# Patient Record
Sex: FEMALE | Race: BLACK OR AFRICAN AMERICAN | NOT HISPANIC OR LATINO | Employment: OTHER | URBAN - METROPOLITAN AREA
[De-identification: names, ages, dates, MRNs, and addresses within clinical notes are randomized per-mention and may not be internally consistent; named-entity substitution may affect disease eponyms.]

---

## 2018-04-04 ENCOUNTER — HISTORICAL (OUTPATIENT)
Dept: CARDIOLOGY | Facility: HOSPITAL | Age: 63
End: 2018-04-04

## 2018-04-04 ENCOUNTER — HISTORICAL (OUTPATIENT)
Dept: RADIOLOGY | Facility: HOSPITAL | Age: 63
End: 2018-04-04

## 2019-05-17 ENCOUNTER — HISTORICAL (OUTPATIENT)
Dept: ADMINISTRATIVE | Facility: HOSPITAL | Age: 64
End: 2019-05-17

## 2019-08-22 ENCOUNTER — HISTORICAL (OUTPATIENT)
Dept: ADMINISTRATIVE | Facility: HOSPITAL | Age: 64
End: 2019-08-22

## 2019-08-25 LAB — FINAL CULTURE: NORMAL

## 2019-12-30 LAB
INFLUENZA A ANTIGEN, POC: NEGATIVE
INFLUENZA B ANTIGEN, POC: NEGATIVE

## 2020-10-16 LAB — PAP RECOMMENDATION EXT: NORMAL

## 2021-03-15 ENCOUNTER — HISTORICAL (OUTPATIENT)
Dept: ADMINISTRATIVE | Facility: HOSPITAL | Age: 66
End: 2021-03-15

## 2021-09-13 ENCOUNTER — HISTORICAL (OUTPATIENT)
Dept: ADMINISTRATIVE | Facility: HOSPITAL | Age: 66
End: 2021-09-13

## 2021-09-16 LAB — FINAL CULTURE: NORMAL

## 2022-02-16 LAB — CRC RECOMMENDATION EXT: NORMAL

## 2022-03-28 ENCOUNTER — HISTORICAL (OUTPATIENT)
Dept: ADMINISTRATIVE | Facility: HOSPITAL | Age: 67
End: 2022-03-28

## 2022-03-28 LAB
CHOLEST SERPL-MCNC: 203 MG/DL
CHOLEST/HDLC SERPL: 4 {RATIO} (ref 0–5)
HDLC SERPL-MCNC: 54 MG/DL (ref 40–60)
LDLC SERPL CALC-MCNC: 128 MG/DL (ref 50–140)
TRIGL SERPL-MCNC: 107 MG/DL (ref 0–150)
VLDLC SERPL CALC-MCNC: 21 MG/DL

## 2022-04-06 ENCOUNTER — HISTORICAL (OUTPATIENT)
Dept: ADMINISTRATIVE | Facility: HOSPITAL | Age: 67
End: 2022-04-06

## 2022-04-06 LAB
ABS NEUT (OLG): 1.8 (ref 2.1–9.2)
ALBUMIN SERPL-MCNC: 3.5 G/DL (ref 3.4–4.8)
ALBUMIN/GLOB SERPL: 0.9 {RATIO} (ref 1.1–2)
ALP SERPL-CCNC: 62 U/L (ref 40–150)
ALT SERPL-CCNC: 15 U/L (ref 0–55)
APPEARANCE, UA: NORMAL
AST SERPL-CCNC: 22 U/L (ref 5–34)
BACTERIA SPEC CULT: NORMAL
BASOPHILS # BLD AUTO: 0 10*3/UL (ref 0–0.2)
BASOPHILS NFR BLD AUTO: 1 %
BILIRUB SERPL-MCNC: 0.5 MG/DL
BILIRUB UR QL STRIP: NEGATIVE
BILIRUBIN DIRECT+TOT PNL SERPL-MCNC: 0.2 (ref 0–0.5)
BILIRUBIN DIRECT+TOT PNL SERPL-MCNC: 0.3 (ref 0–0.8)
BUN SERPL-MCNC: 19.9 MG/DL (ref 9.8–20.1)
CALCIUM SERPL-MCNC: 9.7 MG/DL (ref 8.7–10.5)
CHLORIDE SERPL-SCNC: 103 MMOL/L (ref 98–107)
CHOLEST SERPL-MCNC: 192 MG/DL
CHOLEST/HDLC SERPL: 4 {RATIO} (ref 0–5)
CO2 SERPL-SCNC: 27 MMOL/L (ref 23–31)
COLOR UR: NORMAL
CREAT SERPL-MCNC: 0.91 MG/DL (ref 0.55–1.02)
CREAT UR-MCNC: 305 MG/DL (ref 45–106)
EOSINOPHIL # BLD AUTO: 0.2 10*3/UL (ref 0–0.9)
EOSINOPHIL NFR BLD AUTO: 5 %
ERYTHROCYTE [DISTWIDTH] IN BLOOD BY AUTOMATED COUNT: 13.9 % (ref 11.5–17)
EST. AVERAGE GLUCOSE BLD GHB EST-MCNC: 165.7 MG/DL
GLOBULIN SER-MCNC: 3.9 G/DL (ref 2.4–3.5)
GLUCOSE (UA): NEGATIVE
GLUCOSE SERPL-MCNC: 110 MG/DL (ref 82–115)
HBA1C MFR BLD: 7.4 %
HCT VFR BLD AUTO: 37.8 % (ref 37–47)
HDLC SERPL-MCNC: 50 MG/DL (ref 35–60)
HEMOLYSIS INTERF INDEX SERPL-ACNC: 2
HGB BLD-MCNC: 12.4 G/DL (ref 12–16)
HGB UR QL STRIP: NEGATIVE
ICTERIC INTERF INDEX SERPL-ACNC: 1
KETONES UR QL STRIP: NORMAL
LDLC SERPL CALC-MCNC: 123 MG/DL (ref 50–140)
LEUKOCYTE ESTERASE UR QL STRIP: NORMAL
LIPEMIC INTERF INDEX SERPL-ACNC: 5
LYMPHOCYTES # BLD AUTO: 1.4 10*3/UL (ref 0.6–4.6)
LYMPHOCYTES NFR BLD AUTO: 36 %
MANUAL DIFF? (OHS): NO
MCH RBC QN AUTO: 29.7 PG (ref 27–31)
MCHC RBC AUTO-ENTMCNC: 32.8 G/DL (ref 33–36)
MCV RBC AUTO: 90.4 FL (ref 80–94)
MICROALBUMIN UR-MCNC: 24.2
MICROALBUMIN/CREAT RATIO PNL UR: 7.9 (ref 0–30)
MONOCYTES # BLD AUTO: 0.6 10*3/UL (ref 0.1–1.3)
MONOCYTES NFR BLD AUTO: 14 %
NEUTROPHILS # BLD AUTO: 1.8 10*3/UL (ref 2.1–9.2)
NEUTROPHILS NFR BLD AUTO: 45 %
NITRITE UR QL STRIP: NEGATIVE
PH UR STRIP: 5 [PH] (ref 5–9)
PLATELET # BLD AUTO: 235 10*3/UL (ref 130–400)
PMV BLD AUTO: 10.2 FL (ref 9.4–12.4)
POTASSIUM SERPL-SCNC: 3.6 MMOL/L (ref 3.5–5.1)
PROT SERPL-MCNC: 7.4 G/DL (ref 5.8–7.6)
PROT UR QL STRIP: NEGATIVE
RBC # BLD AUTO: 4.18 10*6/UL (ref 4.2–5.4)
RBC #/AREA URNS HPF: NORMAL /[HPF] (ref 0–2)
SODIUM SERPL-SCNC: 140 MMOL/L (ref 136–145)
SP GR UR STRIP: 1.02 (ref 1–1.03)
SQUAMOUS EPITHELIAL, UA: NORMAL (ref 0–4)
TRIGL SERPL-MCNC: 96 MG/DL (ref 37–140)
TSH SERPL-ACNC: 0.96 M[IU]/L (ref 0.35–4.94)
UA WBC MAN: NORMAL (ref 0–2)
UROBILINOGEN UR STRIP-ACNC: 1
VLDLC SERPL CALC-MCNC: 19 MG/DL
WBC # SPEC AUTO: 4 10*3/UL (ref 4.5–11.5)

## 2022-04-10 ENCOUNTER — HISTORICAL (OUTPATIENT)
Dept: ADMINISTRATIVE | Facility: HOSPITAL | Age: 67
End: 2022-04-10

## 2022-04-24 VITALS
OXYGEN SATURATION: 99 % | SYSTOLIC BLOOD PRESSURE: 121 MMHG | WEIGHT: 160 LBS | HEIGHT: 64 IN | BODY MASS INDEX: 27.31 KG/M2 | DIASTOLIC BLOOD PRESSURE: 85 MMHG

## 2022-07-20 ENCOUNTER — APPOINTMENT (OUTPATIENT)
Dept: LAB | Facility: HOSPITAL | Age: 67
End: 2022-07-20
Attending: INTERNAL MEDICINE
Payer: MEDICARE

## 2022-07-20 DIAGNOSIS — E78.5 HYPERLIPIDEMIA, UNSPECIFIED HYPERLIPIDEMIA TYPE: Primary | ICD-10-CM

## 2022-07-20 DIAGNOSIS — E11.9 DIABETES MELLITUS WITHOUT COMPLICATION: ICD-10-CM

## 2022-07-20 DIAGNOSIS — I10 ESSENTIAL HYPERTENSION, MALIGNANT: ICD-10-CM

## 2022-07-20 LAB
ALBUMIN SERPL-MCNC: 3.6 GM/DL (ref 3.4–4.8)
ALBUMIN/GLOB SERPL: 1 RATIO (ref 1.1–2)
ALP SERPL-CCNC: 54 UNIT/L (ref 40–150)
ALT SERPL-CCNC: 12 UNIT/L (ref 0–55)
APPEARANCE UR: CLEAR
AST SERPL-CCNC: 20 UNIT/L (ref 5–34)
BACTERIA #/AREA URNS AUTO: ABNORMAL /HPF
BASOPHILS # BLD AUTO: 0.06 X10(3)/MCL (ref 0–0.2)
BASOPHILS NFR BLD AUTO: 1.4 %
BILIRUB UR QL STRIP.AUTO: NEGATIVE MG/DL
BILIRUBIN DIRECT+TOT PNL SERPL-MCNC: 0.6 MG/DL
BUN SERPL-MCNC: 16.1 MG/DL (ref 9.8–20.1)
CALCIUM SERPL-MCNC: 9.6 MG/DL (ref 8.4–10.2)
CHLORIDE SERPL-SCNC: 103 MMOL/L (ref 98–107)
CHOLEST SERPL-MCNC: 177 MG/DL
CHOLEST/HDLC SERPL: 4 {RATIO} (ref 0–5)
CO2 SERPL-SCNC: 27 MMOL/L (ref 23–31)
COLOR UR AUTO: YELLOW
CREAT SERPL-MCNC: 0.84 MG/DL (ref 0.55–1.02)
CREAT UR-MCNC: 186.5 MG/DL (ref 47–110)
EOSINOPHIL # BLD AUTO: 0.3 X10(3)/MCL (ref 0–0.9)
EOSINOPHIL NFR BLD AUTO: 7.1 %
ERYTHROCYTE [DISTWIDTH] IN BLOOD BY AUTOMATED COUNT: 13.8 % (ref 11.5–17)
EST. AVERAGE GLUCOSE BLD GHB EST-MCNC: 148.5 MG/DL
GLOBULIN SER-MCNC: 3.6 GM/DL (ref 2.4–3.5)
GLUCOSE SERPL-MCNC: 106 MG/DL (ref 82–115)
GLUCOSE UR QL STRIP.AUTO: NEGATIVE MG/DL
HBA1C MFR BLD: 6.8 %
HCT VFR BLD AUTO: 35.8 % (ref 37–47)
HDLC SERPL-MCNC: 46 MG/DL (ref 35–60)
HGB BLD-MCNC: 11.3 GM/DL (ref 12–16)
IMM GRANULOCYTES # BLD AUTO: 0.01 X10(3)/MCL (ref 0–0.04)
IMM GRANULOCYTES NFR BLD AUTO: 0.2 %
KETONES UR QL STRIP.AUTO: NEGATIVE MG/DL
LDLC SERPL CALC-MCNC: 108 MG/DL (ref 50–140)
LEUKOCYTE ESTERASE UR QL STRIP.AUTO: ABNORMAL UNIT/L
LYMPHOCYTES # BLD AUTO: 1.71 X10(3)/MCL (ref 0.6–4.6)
LYMPHOCYTES NFR BLD AUTO: 40.3 %
MCH RBC QN AUTO: 29.4 PG (ref 27–31)
MCHC RBC AUTO-ENTMCNC: 31.6 MG/DL (ref 33–36)
MCV RBC AUTO: 93.2 FL (ref 80–94)
MICROALBUMIN UR-MCNC: 10.6 UG/ML
MICROALBUMIN/CREAT RATIO PNL UR: 5.7 MG/GM CR (ref 0–30)
MONOCYTES # BLD AUTO: 0.59 X10(3)/MCL (ref 0.1–1.3)
MONOCYTES NFR BLD AUTO: 13.9 %
NEUTROPHILS # BLD AUTO: 1.6 X10(3)/MCL (ref 2.1–9.2)
NEUTROPHILS NFR BLD AUTO: 37.1 %
NITRITE UR QL STRIP.AUTO: NEGATIVE
NRBC BLD AUTO-RTO: 0 %
PH UR STRIP.AUTO: 5 [PH]
PLATELET # BLD AUTO: 268 X10(3)/MCL (ref 130–400)
PMV BLD AUTO: 10.6 FL (ref 7.4–10.4)
POTASSIUM SERPL-SCNC: 4.1 MMOL/L (ref 3.5–5.1)
PROT SERPL-MCNC: 7.2 GM/DL (ref 5.8–7.6)
PROT UR QL STRIP.AUTO: NEGATIVE MG/DL
RBC # BLD AUTO: 3.84 X10(6)/MCL (ref 4.2–5.4)
RBC #/AREA URNS AUTO: ABNORMAL /HPF
RBC UR QL AUTO: NEGATIVE UNIT/L
SODIUM SERPL-SCNC: 138 MMOL/L (ref 136–145)
SP GR UR STRIP.AUTO: 1.02 (ref 1–1.03)
SQUAMOUS #/AREA URNS AUTO: <5 /HPF
TRIGL SERPL-MCNC: 115 MG/DL (ref 37–140)
TSH SERPL-ACNC: 0.8 UIU/ML (ref 0.35–4.94)
UROBILINOGEN UR STRIP-ACNC: 0.2 MG/DL
VLDLC SERPL CALC-MCNC: 23 MG/DL
WBC # SPEC AUTO: 4.2 X10(3)/MCL (ref 4.5–11.5)
WBC #/AREA URNS AUTO: 13 /HPF

## 2022-07-20 PROCEDURE — 81001 URINALYSIS AUTO W/SCOPE: CPT

## 2022-07-20 PROCEDURE — 36415 COLL VENOUS BLD VENIPUNCTURE: CPT

## 2022-07-20 PROCEDURE — 84443 ASSAY THYROID STIM HORMONE: CPT

## 2022-07-20 PROCEDURE — 85025 COMPLETE CBC W/AUTO DIFF WBC: CPT

## 2022-07-20 PROCEDURE — 82043 UR ALBUMIN QUANTITATIVE: CPT

## 2022-07-20 PROCEDURE — 80061 LIPID PANEL: CPT

## 2022-07-20 PROCEDURE — 83036 HEMOGLOBIN GLYCOSYLATED A1C: CPT

## 2022-07-20 PROCEDURE — 80053 COMPREHEN METABOLIC PANEL: CPT

## 2022-07-22 LAB — BACTERIA UR CULT: ABNORMAL

## 2022-07-26 ENCOUNTER — OFFICE VISIT (OUTPATIENT)
Dept: INTERNAL MEDICINE | Facility: CLINIC | Age: 67
End: 2022-07-26
Payer: MEDICARE

## 2022-07-26 VITALS
WEIGHT: 150 LBS | BODY MASS INDEX: 25.61 KG/M2 | OXYGEN SATURATION: 98 % | SYSTOLIC BLOOD PRESSURE: 130 MMHG | HEIGHT: 64 IN | HEART RATE: 68 BPM | DIASTOLIC BLOOD PRESSURE: 60 MMHG

## 2022-07-26 DIAGNOSIS — M81.0 AGE RELATED OSTEOPOROSIS, UNSPECIFIED PATHOLOGICAL FRACTURE PRESENCE: ICD-10-CM

## 2022-07-26 DIAGNOSIS — I10 HYPERTENSION, UNSPECIFIED TYPE: ICD-10-CM

## 2022-07-26 DIAGNOSIS — D64.9 ANEMIA, UNSPECIFIED TYPE: ICD-10-CM

## 2022-07-26 DIAGNOSIS — E78.5 HYPERLIPIDEMIA, UNSPECIFIED HYPERLIPIDEMIA TYPE: Primary | ICD-10-CM

## 2022-07-26 DIAGNOSIS — E11.9 TYPE 2 DIABETES MELLITUS WITHOUT COMPLICATION, WITHOUT LONG-TERM CURRENT USE OF INSULIN: ICD-10-CM

## 2022-07-26 PROCEDURE — 99214 PR OFFICE/OUTPT VISIT, EST, LEVL IV, 30-39 MIN: ICD-10-PCS | Mod: ,,, | Performed by: INTERNAL MEDICINE

## 2022-07-26 PROCEDURE — 99214 OFFICE O/P EST MOD 30 MIN: CPT | Mod: ,,, | Performed by: INTERNAL MEDICINE

## 2022-07-26 RX ORDER — ASPIRIN 325 MG
50000 TABLET, DELAYED RELEASE (ENTERIC COATED) ORAL
COMMUNITY
Start: 2022-07-07 | End: 2023-12-05

## 2022-07-26 RX ORDER — TRIAMTERENE AND HYDROCHLOROTHIAZIDE 37.5; 25 MG/1; MG/1
1 CAPSULE ORAL DAILY
COMMUNITY
Start: 2022-05-20

## 2022-07-26 RX ORDER — ALPRAZOLAM 0.25 MG/1
0.25 TABLET ORAL 3 TIMES DAILY
COMMUNITY
Start: 2022-03-31 | End: 2022-10-17 | Stop reason: SDUPTHER

## 2022-07-26 RX ORDER — GLIMEPIRIDE 4 MG/1
4 TABLET ORAL 2 TIMES DAILY
COMMUNITY
Start: 2022-07-17

## 2022-07-26 RX ORDER — BISOPROLOL FUMARATE 5 MG/1
TABLET, FILM COATED ORAL
COMMUNITY
Start: 2022-01-27 | End: 2023-07-21 | Stop reason: SDUPTHER

## 2022-07-26 RX ORDER — RAMIPRIL 10 MG/1
10 CAPSULE ORAL DAILY
COMMUNITY
Start: 2022-07-11

## 2022-07-26 RX ORDER — SITAGLIPTIN 100 MG/1
100 TABLET, FILM COATED ORAL NIGHTLY
COMMUNITY
Start: 2022-07-13

## 2022-07-26 NOTE — PROGRESS NOTES
Subjective:      Patient ID: Angelia Dooley is a 66 y.o. female.    Chief Complaint: Follow-up (3 month)      HPI: This patient is an established patient. Her active problem list and current medication listed in her chart will be reviewed at the time of this visit. This patient's medical illnesses have been well recognized and documented in her chart. A review of systems will be taken at the time of this visit and any new information necessary for her care will be documented. She has done well since her last visit to the office. She has been compliant in taking medication, and refilling her medication on a regular schedule. She had laboratory studies drawn prior to her office visit, and I took the liberty to review these results in detail with her. I have given her a hand written explanation of the results for her records.  This patient is a 66-year-old female patient who was started on oral agents for adult onset diabetes mellitus, in she has returned in follow-up to have repeat laboratory studies, which were already mentioned in this narrative.  She is doing well, and she has not had any side effects from the medication given.  She still has situational stresses, because her mother has been chronically ill, and  lives in a nursing home environment.  She did lose weight since her last visit, and she is roughly 152 lb at present.         The patient's Health Maintenance was reviewed and the following appears to be due at this time:   Health Maintenance Due   Topic Date Due    Hepatitis C Screening  Never done    Foot Exam  Never done    Eye Exam  Never done    HIV Screening  Never done    TETANUS VACCINE  Never done    Low Dose Statin  Never done    DEXA Scan  Never done    Colorectal Cancer Screening  Never done    COVID-19 Vaccine (4 - Booster for Moderna series) 03/10/2022        Recent Labwork  Appointment on 07/20/2022   Component Date Value Ref Range Status    Sodium Level 07/20/2022 138   136 - 145 mmol/L Final    Potassium Level 07/20/2022 4.1  3.5 - 5.1 mmol/L Final    Chloride 07/20/2022 103  98 - 107 mmol/L Final    Carbon Dioxide 07/20/2022 27  23 - 31 mmol/L Final    Glucose Level 07/20/2022 106  82 - 115 mg/dL Final    Blood Urea Nitrogen 07/20/2022 16.1  9.8 - 20.1 mg/dL Final    Creatinine 07/20/2022 0.84  0.55 - 1.02 mg/dL Final    Calcium Level Total 07/20/2022 9.6  8.4 - 10.2 mg/dL Final    Protein Total 07/20/2022 7.2  5.8 - 7.6 gm/dL Final    Albumin Level 07/20/2022 3.6  3.4 - 4.8 gm/dL Final    Globulin 07/20/2022 3.6 (A) 2.4 - 3.5 gm/dL Final    Albumin/Globulin Ratio 07/20/2022 1.0 (A) 1.1 - 2.0 ratio Final    Bilirubin Total 07/20/2022 0.6  <=1.5 mg/dL Final    Alkaline Phosphatase 07/20/2022 54  40 - 150 unit/L Final    Alanine Aminotransferase 07/20/2022 12  0 - 55 unit/L Final    Aspartate Aminotransferase 07/20/2022 20  5 - 34 unit/L Final    Estimated GFR- 07/20/2022 >60  mls/min/1.73/m2 Final    Cholesterol Total 07/20/2022 177  <=200 mg/dL Final    HDL Cholesterol 07/20/2022 46  35 - 60 mg/dL Final    Triglyceride 07/20/2022 115  37 - 140 mg/dL Final    Cholesterol/HDL Ratio 07/20/2022 4  0 - 5 Final    Very Low Density Lipoprotein 07/20/2022 23   Final    LDL Cholesterol 07/20/2022 108.00  50.00 - 140.00 mg/dL Final    Hemoglobin A1c 07/20/2022 6.8  <=7.0 % Final    Estimated Average Glucose 07/20/2022 148.5  mg/dL Final    Thyroid Stimulating Hormone 07/20/2022 0.8025  0.3500 - 4.9400 uIU/mL Final    Color, UA 07/20/2022 Yellow  Yellow, Colorless, Other, Clear Final    Appearance, UA 07/20/2022 Clear  Clear Final    Specific Gravity, UA 07/20/2022 1.018  1.001 - 1.030 Final    pH, UA 07/20/2022 5.0  5.0, 5.5, 6.0, 6.5, 7.0, 7.5, 8.0, 8.5 Final    Protein, UA 07/20/2022 Negative  Negative, 300  mg/dL Final    Glucose, UA 07/20/2022 Negative  Negative, Normal mg/dL Final    Ketones, UA 07/20/2022 Negative  Negative, +1, +2, +3,  +4, +5, >=160, >=80 mg/dL Final    Blood, UA 07/20/2022 Negative  Negative unit/L Final    Bilirubin, UA 07/20/2022 Negative  Negative mg/dL Final    Urobilinogen, UA 07/20/2022 0.2  0.2, 1.0, Normal mg/dL Final    Nitrites, UA 07/20/2022 Negative  Negative Final    Leukocyte Esterase, UA 07/20/2022 2+ (A) Negative, 75  unit/L Final    Urine Microalbumin 07/20/2022 10.6  <=30.0 ug/ml Final    Urine Creatinine 07/20/2022 186.5 (A) 47.0 - 110.0 mg/dL Final    Microalbumin Creatinine Ratio 07/20/2022 5.7  0.0 - 30.0 mg/gm Cr Final    WBC 07/20/2022 4.2 (A) 4.5 - 11.5 x10(3)/mcL Final    RBC 07/20/2022 3.84 (A) 4.20 - 5.40 x10(6)/mcL Final    Hgb 07/20/2022 11.3 (A) 12.0 - 16.0 gm/dL Final    Hct 07/20/2022 35.8 (A) 37.0 - 47.0 % Final    MCV 07/20/2022 93.2  80.0 - 94.0 fL Final    MCH 07/20/2022 29.4  27.0 - 31.0 pg Final    MCHC 07/20/2022 31.6 (A) 33.0 - 36.0 mg/dL Final    RDW 07/20/2022 13.8  11.5 - 17.0 % Final    Platelet 07/20/2022 268  130 - 400 x10(3)/mcL Final    MPV 07/20/2022 10.6 (A) 7.4 - 10.4 fL Final    Neut % 07/20/2022 37.1  % Final    Lymph % 07/20/2022 40.3  % Final    Mono % 07/20/2022 13.9  % Final    Eos % 07/20/2022 7.1  % Final    Basophil % 07/20/2022 1.4  % Final    Lymph # 07/20/2022 1.71  0.6 - 4.6 x10(3)/mcL Final    Neut # 07/20/2022 1.6 (A) 2.1 - 9.2 x10(3)/mcL Final    Mono # 07/20/2022 0.59  0.1 - 1.3 x10(3)/mcL Final    Eos # 07/20/2022 0.30  0 - 0.9 x10(3)/mcL Final    Baso # 07/20/2022 0.06  0 - 0.2 x10(3)/mcL Final    IG# 07/20/2022 0.01  0 - 0.04 x10(3)/mcL Final    IG% 07/20/2022 0.2  % Final    NRBC% 07/20/2022 0.0  % Final    RBC, UA 07/20/2022 0-2  None Seen, 0-2, 3-5, 0-5 /HPF Final    WBC, UA 07/20/2022 13 (A) <=5 /HPF Final    Squamous Epithelial Cells, UA 07/20/2022 <5  <=5 /HPF Final    Bacteria, UA 07/20/2022 None Seen  None Seen, Rare, Occasional /HPF Final    Urine Culture 07/20/2022 Less than 10,000 colonies/ml Klebsiella  pneumoniae (A)  Final    [place] counts <10,000/ml are of questionable significance and may or may not indicate contamination.  Therefore organisms are identified only.  If further workup is desired please notify Microbiology        Past Medical History:  Past Medical History:   Diagnosis Date    Diabetes mellitus, type 2     Hyperlipidemia     Hypertension      Past Surgical History:   Procedure Laterality Date    CHOLECYSTECTOMY      HYSTERECTOMY      MYOMECTOMY       Review of patient's allergies indicates:   Allergen Reactions    Acetaminophen-codeine     Cyclobenzaprine     Hydrocodone-acetaminophen      Current Outpatient Medications on File Prior to Visit   Medication Sig Dispense Refill    ALPRAZolam (XANAX) 0.25 MG tablet Take 0.25 mg by mouth 3 (three) times daily.      bisoprolol (ZEBETA) 5 MG tablet   See Instructions, TAKE 1 TABLET BY MOUTH EVERY DAY, # 90 tab(s), 3 Refill(s), Pharmacy: [facility]/pharmacy #[id number], 163, cm, Height/Length Dosing, 03/31/22 10:47:00 CDT, 69, kg, Weight Dosing, 03/31/22 10:47:00 CDT      cholecalciferol, vitamin D3, 1,250 mcg (50,000 unit) capsule Take 50,000 Units by mouth every 7 days.      glimepiride (AMARYL) 2 MG tablet       JANUVIA 100 mg Tab Take 100 mg by mouth every evening.      ramipriL (ALTACE) 10 MG capsule Take 10 mg by mouth once daily.      triamterene-hydrochlorothiazide 37.5-25 mg (DYAZIDE) 37.5-25 mg per capsule Take 1 capsule by mouth once daily.       No current facility-administered medications on file prior to visit.     Social History     Socioeconomic History    Marital status:    Tobacco Use    Smoking status: Never Smoker    Smokeless tobacco: Never Used   Substance and Sexual Activity    Alcohol use: Not Currently    Drug use: Never    Sexual activity: Not Currently     History reviewed. No pertinent family history.    Review of Systems  Review of Systems   Constitutional: Negative.    HENT: Negative.    Eyes: Negative.   "  Respiratory: Negative.    Cardiovascular: Negative.    Gastrointestinal: Negative.    Genitourinary: Negative.    Musculoskeletal: Negative.    Neurological: Negative.    Endo/Heme/Allergies: Negative.    Psychiatric/Behavioral: Negative.    Objective:   /60   Pulse 68   Ht 5' 4" (1.626 m)   Wt 68 kg (150 lb)   SpO2 98%   BMI 25.75 kg/m²     Protective Sensation (w/ 10 gram monofilament):  Right: Intact  Left: Intact    Visual Inspection:  Normal -  Bilateral    Pedal Pulses:   Right: Present  Left: Present    Posterior tibialis:   Right:Present  Left: Present      Physical Exam  Vitals and nursing note reviewed.   Constitutional:       General: He is not in acute distress.     Appearance: Normal appearance.   HENT:      Head: Normocephalic and atraumatic.      Right Ear: Tympanic membrane and ear canal normal.      Left Ear: Tympanic membrane and ear canal normal.      Nose: Nose normal.      Mouth/Throat:      Pharynx: Oropharynx is clear. No oropharyngeal exudate or posterior oropharyngeal erythema.   Eyes:      Extraocular Movements: Extraocular movements intact.      Pupils: Pupils are equal, round, and reactive to light.   Cardiovascular:      Rate and Rhythm: Normal rate and regular rhythm.      Pulses: Normal pulses.      Heart sounds: Normal heart sounds. No murmur heard.    No friction rub. No gallop.   Pulmonary:      Effort: Pulmonary effort is normal. No respiratory distress.      Breath sounds: Normal breath sounds.   Abdominal:      General: Abdomen is flat. Bowel sounds are normal.      Palpations: Abdomen is soft.   Genitourinary:     Rectum: Normal.   Musculoskeletal:         General: Normal range of motion.      Cervical back: Normal range of motion and neck supple.   Skin:     General: Skin is warm.      Capillary Refill: Capillary refill takes less than 2 seconds.   Neurological:      General: No focal deficit present.      Mental Status: He is alert and oriented to person, place, " and time.   Psychiatric:         Mood and Affect: Mood normal.         Behavior: Behavior normal.         Thought Content: Thought content normal.         Judgment: Judgment normal.   Assessment:     1. Hyperlipidemia, unspecified hyperlipidemia type    2. Hypertension, unspecified type    3. Type 2 diabetes mellitus without complication, without long-term current use of insulin      Plan:   I am having Angelia Dooley maintain her ALPRAZolam, bisoprolol, cholecalciferol (vitamin D3), glimepiride, ramipriL, JANUVIA, and triamterene-hydrochlorothiazide 37.5-25 mg. This patient is an established patient who has come in for an examination. She has done very well since her last visit to the office. She has a negative review of systems, except as mentioned above. She has not had any new problems to develop in the recent past. I was able to review her laboratory studies with her completely, as mentioned in the history of present illness. I will make medication changes as necessary, and her follow-up will continue as before.  This patient is laboratory studies have improved considerably since she was last seen in the office.  She has not had any side effects from her medication, which will be continued.  She did have a slight decrease in her hemoglobin and hematocrit levels, and I have asked her to take iron supplements for at least 6-8 weeks, and she will return in follow-up at that time.  She will report any increase in symptoms or new symptoms that may occur while on this medication.  Otherwise, she is tolerating her diabetic medication well, and she has no significant weight loss to itchy.  Activity, such as exercise may help her control her blood sugar going forward.  I answered all the questions that she had while in the office, and I have given her hand written explanation of her results for her records.    This patient should continue to take all medication chronically given for known medical illnesses.    I have  made recommendations regarding better glycemic control with this patient today. The goal is to keep the blood sugar under a hemoglobin A1c of 7. Diet, medication, an increase in exercise or physical activity has been stressed.    I discussed blood pressure management strategies with the patient today. I also recommend a low salt and low pork diet. We discussed antihypertensive medication. I have stressed an increase in physical activity and exercise.    Exercise is defined as 30 minutes of sustained activity performed at least 3 or 4 days each week.          Problem List Items Addressed This Visit    None     Visit Diagnoses     Hyperlipidemia, unspecified hyperlipidemia type    -  Primary    Relevant Orders    CBC Auto Differential    Comprehensive Metabolic Panel    Hemoglobin A1C    Lipid Panel    Microalbumin/Creatinine Ratio, Urine    Urinalysis, Reflex to Urine Culture Urine, Clean Catch    TSH    Hypertension, unspecified type        Relevant Orders    CBC Auto Differential    Comprehensive Metabolic Panel    Hemoglobin A1C    Lipid Panel    Microalbumin/Creatinine Ratio, Urine    Urinalysis, Reflex to Urine Culture Urine, Clean Catch    TSH    Type 2 diabetes mellitus without complication, without long-term current use of insulin        Relevant Medications    glimepiride (AMARYL) 2 MG tablet    JANUVIA 100 mg Tab    Other Relevant Orders    CBC Auto Differential    Comprehensive Metabolic Panel    Hemoglobin A1C    Lipid Panel    Microalbumin/Creatinine Ratio, Urine    Urinalysis, Reflex to Urine Culture Urine, Clean Catch    TSH          Angelia was seen today for follow-up.    Diagnoses and all orders for this visit:    Hyperlipidemia, unspecified hyperlipidemia type  -     CBC Auto Differential; Future  -     Comprehensive Metabolic Panel; Future  -     Hemoglobin A1C; Future  -     Lipid Panel; Future  -     Microalbumin/Creatinine Ratio, Urine; Future  -     Urinalysis, Reflex to Urine Culture Urine,  Clean Catch; Future  -     TSH; Future    Hypertension, unspecified type  -     CBC Auto Differential; Future  -     Comprehensive Metabolic Panel; Future  -     Hemoglobin A1C; Future  -     Lipid Panel; Future  -     Microalbumin/Creatinine Ratio, Urine; Future  -     Urinalysis, Reflex to Urine Culture Urine, Clean Catch; Future  -     TSH; Future    Type 2 diabetes mellitus without complication, without long-term current use of insulin  -     CBC Auto Differential; Future  -     Comprehensive Metabolic Panel; Future  -     Hemoglobin A1C; Future  -     Lipid Panel; Future  -     Microalbumin/Creatinine Ratio, Urine; Future  -     Urinalysis, Reflex to Urine Culture Urine, Clean Catch; Future  -     TSH; Future

## 2022-07-28 ENCOUNTER — PATIENT OUTREACH (OUTPATIENT)
Dept: ADMINISTRATIVE | Facility: HOSPITAL | Age: 67
End: 2022-07-28
Payer: MEDICARE

## 2022-08-04 ENCOUNTER — HOSPITAL ENCOUNTER (OUTPATIENT)
Dept: RADIOLOGY | Facility: HOSPITAL | Age: 67
Discharge: HOME | End: 2022-08-04
Attending: INTERNAL MEDICINE
Payer: MEDICARE

## 2022-08-04 DIAGNOSIS — M81.0 AGE RELATED OSTEOPOROSIS, UNSPECIFIED PATHOLOGICAL FRACTURE PRESENCE: ICD-10-CM

## 2022-08-04 PROCEDURE — 77080 DEXA BONE DENSITY SPINE HIP: ICD-10-PCS | Mod: 26,,, | Performed by: RADIOLOGY

## 2022-08-04 PROCEDURE — 77080 DXA BONE DENSITY AXIAL: CPT | Mod: 26,,, | Performed by: RADIOLOGY

## 2022-08-04 PROCEDURE — 77080 DXA BONE DENSITY AXIAL: CPT | Mod: TC

## 2022-08-09 ENCOUNTER — TELEPHONE (OUTPATIENT)
Dept: INTERNAL MEDICINE | Facility: CLINIC | Age: 67
End: 2022-08-09
Payer: MEDICARE

## 2022-08-09 NOTE — TELEPHONE ENCOUNTER
Dr. Serrato spoke to patient  ----- Message from Kait Escalera sent at 8/8/2022  3:53 PM CDT -----  Called for results of Bone Density. She viewed her results on the portal but doesn't understand it  Please advise, [phone]

## 2022-09-20 ENCOUNTER — TELEPHONE (OUTPATIENT)
Dept: INTERNAL MEDICINE | Facility: CLINIC | Age: 67
End: 2022-09-20
Payer: MEDICARE

## 2022-09-20 NOTE — TELEPHONE ENCOUNTER
Patient was called this morning to come by and  some hemoccult cards. This is common to have when taking Iron     ----- Message from Kait Escalera sent at 9/19/2022  3:04 PM CDT -----  Started taking Feosol sulfate Iron 65mg daily, having black, dark stools really bad.  Concerned about this  Please advise [phone]

## 2022-09-21 ENCOUNTER — HISTORICAL (OUTPATIENT)
Dept: ADMINISTRATIVE | Facility: HOSPITAL | Age: 67
End: 2022-09-21
Payer: MEDICARE

## 2022-09-22 ENCOUNTER — DOCUMENTATION ONLY (OUTPATIENT)
Dept: INTERNAL MEDICINE | Facility: CLINIC | Age: 67
End: 2022-09-22

## 2022-09-22 LAB
OCCULT BLOOD, STOOL #1: NEGATIVE
OCCULT BLOOD, STOOL #2: NEGATIVE
OCCULT BLOOD, STOOL #3: NEGATIVE

## 2022-10-11 ENCOUNTER — LAB VISIT (OUTPATIENT)
Dept: LAB | Facility: HOSPITAL | Age: 67
End: 2022-10-11
Attending: INTERNAL MEDICINE
Payer: MEDICARE

## 2022-10-11 DIAGNOSIS — E11.9 TYPE 2 DIABETES MELLITUS WITHOUT COMPLICATION, WITHOUT LONG-TERM CURRENT USE OF INSULIN: ICD-10-CM

## 2022-10-11 DIAGNOSIS — E78.5 HYPERLIPIDEMIA, UNSPECIFIED HYPERLIPIDEMIA TYPE: ICD-10-CM

## 2022-10-11 DIAGNOSIS — I10 HYPERTENSION, UNSPECIFIED TYPE: ICD-10-CM

## 2022-10-11 DIAGNOSIS — D64.9 ANEMIA, UNSPECIFIED TYPE: ICD-10-CM

## 2022-10-11 LAB
ALBUMIN SERPL-MCNC: 3.7 GM/DL (ref 3.4–4.8)
ALBUMIN/GLOB SERPL: 0.9 RATIO (ref 1.1–2)
ALP SERPL-CCNC: 61 UNIT/L (ref 40–150)
ALT SERPL-CCNC: 19 UNIT/L (ref 0–55)
APPEARANCE UR: CLEAR
AST SERPL-CCNC: 24 UNIT/L (ref 5–34)
BACTERIA #/AREA URNS AUTO: ABNORMAL /HPF
BASOPHILS # BLD AUTO: 0.05 X10(3)/MCL (ref 0–0.2)
BASOPHILS NFR BLD AUTO: 1.1 %
BILIRUB UR QL STRIP.AUTO: NEGATIVE MG/DL
BILIRUBIN DIRECT+TOT PNL SERPL-MCNC: 0.5 MG/DL
BUN SERPL-MCNC: 16.9 MG/DL (ref 9.8–20.1)
CALCIUM SERPL-MCNC: 9.8 MG/DL (ref 8.4–10.2)
CHLORIDE SERPL-SCNC: 101 MMOL/L (ref 98–107)
CHOLEST SERPL-MCNC: 186 MG/DL
CHOLEST/HDLC SERPL: 4 {RATIO} (ref 0–5)
CO2 SERPL-SCNC: 31 MMOL/L (ref 23–31)
COLOR UR AUTO: YELLOW
CREAT SERPL-MCNC: 0.83 MG/DL (ref 0.55–1.02)
CREAT UR-MCNC: 191.3 MG/DL (ref 47–110)
EOSINOPHIL # BLD AUTO: 0.42 X10(3)/MCL (ref 0–0.9)
EOSINOPHIL NFR BLD AUTO: 9.6 %
ERYTHROCYTE [DISTWIDTH] IN BLOOD BY AUTOMATED COUNT: 13.4 % (ref 11.5–17)
EST. AVERAGE GLUCOSE BLD GHB EST-MCNC: 145.6 MG/DL
GFR SERPLBLD CREATININE-BSD FMLA CKD-EPI: >60 MLS/MIN/1.73/M2
GLOBULIN SER-MCNC: 3.9 GM/DL (ref 2.4–3.5)
GLUCOSE SERPL-MCNC: 116 MG/DL (ref 82–115)
GLUCOSE UR QL STRIP.AUTO: NEGATIVE MG/DL
HBA1C MFR BLD: 6.7 %
HCT VFR BLD AUTO: 37 % (ref 37–47)
HDLC SERPL-MCNC: 45 MG/DL (ref 35–60)
HGB BLD-MCNC: 12.2 GM/DL (ref 12–16)
IMM GRANULOCYTES # BLD AUTO: 0.01 X10(3)/MCL (ref 0–0.04)
IMM GRANULOCYTES NFR BLD AUTO: 0.2 %
IRON SATN MFR SERPL: 26 % (ref 20–50)
IRON SERPL-MCNC: 76 UG/DL (ref 50–170)
KETONES UR QL STRIP.AUTO: NEGATIVE MG/DL
LDLC SERPL CALC-MCNC: 117 MG/DL (ref 50–140)
LEUKOCYTE ESTERASE UR QL STRIP.AUTO: ABNORMAL UNIT/L
LYMPHOCYTES # BLD AUTO: 2.07 X10(3)/MCL (ref 0.6–4.6)
LYMPHOCYTES NFR BLD AUTO: 47.4 %
MCH RBC QN AUTO: 29.3 PG (ref 27–31)
MCHC RBC AUTO-ENTMCNC: 33 MG/DL (ref 33–36)
MCV RBC AUTO: 88.7 FL (ref 80–94)
MICROALBUMIN UR-MCNC: 25.8 UG/ML
MICROALBUMIN/CREAT RATIO PNL UR: 13.5 MG/GM CR (ref 0–30)
MONOCYTES # BLD AUTO: 0.63 X10(3)/MCL (ref 0.1–1.3)
MONOCYTES NFR BLD AUTO: 14.4 %
NEUTROPHILS # BLD AUTO: 1.2 X10(3)/MCL (ref 2.1–9.2)
NEUTROPHILS NFR BLD AUTO: 27.3 %
NITRITE UR QL STRIP.AUTO: NEGATIVE
NRBC BLD AUTO-RTO: 0 %
PH UR STRIP.AUTO: 5 [PH]
PLATELET # BLD AUTO: 260 X10(3)/MCL (ref 130–400)
PMV BLD AUTO: 10.3 FL (ref 7.4–10.4)
POTASSIUM SERPL-SCNC: 3.5 MMOL/L (ref 3.5–5.1)
PROT SERPL-MCNC: 7.6 GM/DL (ref 5.8–7.6)
PROT UR QL STRIP.AUTO: NEGATIVE MG/DL
RBC # BLD AUTO: 4.17 X10(6)/MCL (ref 4.2–5.4)
RBC #/AREA URNS AUTO: <5 /HPF
RBC UR QL AUTO: NEGATIVE UNIT/L
SODIUM SERPL-SCNC: 139 MMOL/L (ref 136–145)
SP GR UR STRIP.AUTO: 1.02 (ref 1–1.03)
SQUAMOUS #/AREA URNS AUTO: <5 /HPF
TIBC SERPL-MCNC: 216 UG/DL (ref 70–310)
TIBC SERPL-MCNC: 292 UG/DL (ref 250–450)
TRANSFERRIN SERPL-MCNC: 259 MG/DL (ref 173–360)
TRIGL SERPL-MCNC: 120 MG/DL (ref 37–140)
TSH SERPL-ACNC: 1.14 UIU/ML (ref 0.35–4.94)
UROBILINOGEN UR STRIP-ACNC: 0.2 MG/DL
VLDLC SERPL CALC-MCNC: 24 MG/DL
WBC # SPEC AUTO: 4.4 X10(3)/MCL (ref 4.5–11.5)
WBC #/AREA URNS AUTO: 57 /HPF

## 2022-10-11 PROCEDURE — 83540 ASSAY OF IRON: CPT

## 2022-10-11 PROCEDURE — 80061 LIPID PANEL: CPT

## 2022-10-11 PROCEDURE — 80053 COMPREHEN METABOLIC PANEL: CPT

## 2022-10-11 PROCEDURE — 81001 URINALYSIS AUTO W/SCOPE: CPT

## 2022-10-11 PROCEDURE — 85025 COMPLETE CBC W/AUTO DIFF WBC: CPT

## 2022-10-11 PROCEDURE — 82043 UR ALBUMIN QUANTITATIVE: CPT

## 2022-10-11 PROCEDURE — 83036 HEMOGLOBIN GLYCOSYLATED A1C: CPT

## 2022-10-11 PROCEDURE — 84443 ASSAY THYROID STIM HORMONE: CPT

## 2022-10-11 PROCEDURE — 87077 CULTURE AEROBIC IDENTIFY: CPT

## 2022-10-11 PROCEDURE — 36415 COLL VENOUS BLD VENIPUNCTURE: CPT

## 2022-10-12 LAB
BACTERIA UR CULT: ABNORMAL
BACTERIA UR CULT: ABNORMAL

## 2022-10-17 ENCOUNTER — OFFICE VISIT (OUTPATIENT)
Dept: INTERNAL MEDICINE | Facility: CLINIC | Age: 67
End: 2022-10-17
Payer: MEDICARE

## 2022-10-17 VITALS
WEIGHT: 148 LBS | DIASTOLIC BLOOD PRESSURE: 60 MMHG | OXYGEN SATURATION: 97 % | HEART RATE: 72 BPM | BODY MASS INDEX: 25.27 KG/M2 | SYSTOLIC BLOOD PRESSURE: 120 MMHG | HEIGHT: 64 IN

## 2022-10-17 DIAGNOSIS — I10 HYPERTENSION, UNSPECIFIED TYPE: ICD-10-CM

## 2022-10-17 DIAGNOSIS — D64.9 ANEMIA, UNSPECIFIED TYPE: ICD-10-CM

## 2022-10-17 DIAGNOSIS — E11.9 TYPE 2 DIABETES MELLITUS WITHOUT COMPLICATION, WITHOUT LONG-TERM CURRENT USE OF INSULIN: ICD-10-CM

## 2022-10-17 DIAGNOSIS — E78.5 HYPERLIPIDEMIA, UNSPECIFIED HYPERLIPIDEMIA TYPE: Primary | ICD-10-CM

## 2022-10-17 PROCEDURE — 99214 OFFICE O/P EST MOD 30 MIN: CPT | Mod: ,,, | Performed by: INTERNAL MEDICINE

## 2022-10-17 PROCEDURE — 99214 PR OFFICE/OUTPT VISIT, EST, LEVL IV, 30-39 MIN: ICD-10-PCS | Mod: ,,, | Performed by: INTERNAL MEDICINE

## 2022-10-17 RX ORDER — ALPRAZOLAM 0.25 MG/1
0.25 TABLET ORAL 3 TIMES DAILY
Qty: 90 TABLET | Refills: 0 | Status: SHIPPED | OUTPATIENT
Start: 2022-10-17 | End: 2023-05-02 | Stop reason: SDUPTHER

## 2022-10-17 NOTE — PROGRESS NOTES
Subjective:      Patient ID: Angelia Dooley is a 67 y.o. female.    Chief Complaint: Follow-up (3 month)      HPI:This patient is an established patient. Her active problem list and current medication listed in her chart will be reviewed at the time of this visit. This patient's medical illnesses have been well recognized and documented in her chart. A review of systems will be taken at the time of this visit and any new information necessary for her care will be documented. She has done well since her last visit to the office. She has been compliant in taking medication, and refilling her medication on a regular schedule. She had laboratory studies drawn prior to her office visit, and I took the liberty to review these results in detail with her. I have given her a hand written explanation of the results for her records.    This patient has returned for his 3 month follow-up re-evaluation.  She is currently 67 years of age, and she continues to lead an active lifestyle.  Her mother resides at a nursing home, and she is quite worried about her mother's long-term health of.  She sees her daily at the nursing home.  She has been compliant in taking medication, and in the recent past, she has done well, without any new problems developing.     The patient's Health Maintenance was reviewed and the following appears to be due at this time:   Health Maintenance Due   Topic Date Due    Hepatitis C Screening  Never done    TETANUS VACCINE  Never done    Low Dose Statin  Never done    Colorectal Cancer Screening  Never done    Pneumococcal Vaccines (Age 65+) (2 - PPSV23 if available, else PCV20) 10/18/2022    Mammogram  11/08/2022        Recent Labwork  Lab Visit on 10/11/2022   Component Date Value Ref Range Status    Sodium Level 10/11/2022 139  136 - 145 mmol/L Final    Potassium Level 10/11/2022 3.5  3.5 - 5.1 mmol/L Final    Chloride 10/11/2022 101  98 - 107 mmol/L Final    Carbon Dioxide 10/11/2022 31  23 - 31  mmol/L Final    Glucose Level 10/11/2022 116 (H)  82 - 115 mg/dL Final    Blood Urea Nitrogen 10/11/2022 16.9  9.8 - 20.1 mg/dL Final    Creatinine 10/11/2022 0.83  0.55 - 1.02 mg/dL Final    Calcium Level Total 10/11/2022 9.8  8.4 - 10.2 mg/dL Final    Protein Total 10/11/2022 7.6  5.8 - 7.6 gm/dL Final    Albumin Level 10/11/2022 3.7  3.4 - 4.8 gm/dL Final    Globulin 10/11/2022 3.9 (H)  2.4 - 3.5 gm/dL Final    Albumin/Globulin Ratio 10/11/2022 0.9 (L)  1.1 - 2.0 ratio Final    Bilirubin Total 10/11/2022 0.5  <=1.5 mg/dL Final    Alkaline Phosphatase 10/11/2022 61  40 - 150 unit/L Final    Alanine Aminotransferase 10/11/2022 19  0 - 55 unit/L Final    Aspartate Aminotransferase 10/11/2022 24  5 - 34 unit/L Final    eGFR 10/11/2022 >60  mls/min/1.73/m2 Final    Hemoglobin A1c 10/11/2022 6.7  <=7.0 % Final    Estimated Average Glucose 10/11/2022 145.6  mg/dL Final    Cholesterol Total 10/11/2022 186  <=200 mg/dL Final    HDL Cholesterol 10/11/2022 45  35 - 60 mg/dL Final    Triglyceride 10/11/2022 120  37 - 140 mg/dL Final    Cholesterol/HDL Ratio 10/11/2022 4  0 - 5 Final    Very Low Density Lipoprotein 10/11/2022 24   Final    LDL Cholesterol 10/11/2022 117.00  50.00 - 140.00 mg/dL Final    Urine Microalbumin 10/11/2022 25.8  <=30.0 ug/ml Final    Urine Creatinine 10/11/2022 191.3 (H)  47.0 - 110.0 mg/dL Final    Microalbumin Creatinine Ratio 10/11/2022 13.5  0.0 - 30.0 mg/gm Cr Final    Color, UA 10/11/2022 Yellow  Yellow, Light-Yellow, Dark Yellow, Betty, Straw Final    Appearance, UA 10/11/2022 Clear  Clear Final    Specific Gravity, UA 10/11/2022 1.022  1.001 - 1.030 Final    pH, UA 10/11/2022 5.0  5.0, 5.5, 6.0, 6.5, 7.0, 7.5, 8.0, 8.5 Final    Protein, UA 10/11/2022 Negative  Negative mg/dL Final    Glucose, UA 10/11/2022 Negative  Negative, Normal mg/dL Final    Ketones, UA 10/11/2022 Negative  Negative mg/dL Final    Blood, UA 10/11/2022 Negative  Negative unit/L Final    Bilirubin, UA 10/11/2022 Negative   Negative mg/dL Final    Urobilinogen, UA 10/11/2022 0.2  0.2, 1.0, Normal mg/dL Final    Nitrites, UA 10/11/2022 Negative  Negative Final    Leukocyte Esterase, UA 10/11/2022 3+ (A)  Negative unit/L Final    Thyroid Stimulating Hormone 10/11/2022 1.1386  0.3500 - 4.9400 uIU/mL Final    Iron Binding Capacity Unsaturated 10/11/2022 216  70 - 310 ug/dL Final    Iron Level 10/11/2022 76  50 - 170 ug/dL Final    Transferrin 10/11/2022 259  173 - 360 mg/dL Final    Iron Binding Capacity Total 10/11/2022 292  250 - 450 ug/dL Final    Iron Saturation 10/11/2022 26  20 - 50 % Final    WBC 10/11/2022 4.4 (L)  4.5 - 11.5 x10(3)/mcL Final    RBC 10/11/2022 4.17 (L)  4.20 - 5.40 x10(6)/mcL Final    Hgb 10/11/2022 12.2  12.0 - 16.0 gm/dL Final    Hct 10/11/2022 37.0  37.0 - 47.0 % Final    MCV 10/11/2022 88.7  80.0 - 94.0 fL Final    MCH 10/11/2022 29.3  27.0 - 31.0 pg Final    MCHC 10/11/2022 33.0  33.0 - 36.0 mg/dL Final    RDW 10/11/2022 13.4  11.5 - 17.0 % Final    Platelet 10/11/2022 260  130 - 400 x10(3)/mcL Final    MPV 10/11/2022 10.3  7.4 - 10.4 fL Final    Neut % 10/11/2022 27.3  % Final    Lymph % 10/11/2022 47.4  % Final    Mono % 10/11/2022 14.4  % Final    Eos % 10/11/2022 9.6  % Final    Basophil % 10/11/2022 1.1  % Final    Lymph # 10/11/2022 2.07  0.6 - 4.6 x10(3)/mcL Final    Neut # 10/11/2022 1.2 (L)  2.1 - 9.2 x10(3)/mcL Final    Mono # 10/11/2022 0.63  0.1 - 1.3 x10(3)/mcL Final    Eos # 10/11/2022 0.42  0 - 0.9 x10(3)/mcL Final    Baso # 10/11/2022 0.05  0 - 0.2 x10(3)/mcL Final    IG# 10/11/2022 0.01  0 - 0.04 x10(3)/mcL Final    IG% 10/11/2022 0.2  % Final    NRBC% 10/11/2022 0.0  % Final    RBC, UA 10/11/2022 <5  <=5 /HPF Final    WBC, UA 10/11/2022 57 (H)  <=5 /HPF Final    Squamous Epithelial Cells, UA 10/11/2022 <5  <=5 /HPF Final    Bacteria, UA 10/11/2022 None Seen  None Seen, Rare, Occasional /HPF Final    Urine Culture 10/11/2022 No other significant growth   Final    Urine Culture 10/11/2022 Less  than 10,000 colonies/ml Escherichia coli (A)   Final    [place] counts <10,000/ml are of questionable significance and may or may not indicate contamination.  Therefore organisms are identified only.  If further workup is desired please notify Microbiology    Documentation Only on 09/22/2022   Component Date Value Ref Range Status    Occult Blood, Stool #1 09/22/2022 negative   Final    Occult Blood, Stool #2 09/22/2022 negative   Final    Occult Blood, Stool #3 09/22/2022 negative   Final   Historical on 09/21/2022   Component Date Value Ref Range Status    Inflenza A Ag 12/30/2019 Negative   Final    Influenza B Ag 12/30/2019 Negative   Final       Past Medical History:  Past Medical History:   Diagnosis Date    Diabetes mellitus, type 2     Hyperlipidemia     Hypertension      Past Surgical History:   Procedure Laterality Date    CHOLECYSTECTOMY      HYSTERECTOMY      MYOMECTOMY       Review of patient's allergies indicates:   Allergen Reactions    Acetaminophen-codeine     Cyclobenzaprine     Hydrocodone-acetaminophen     Tramadol      Current Outpatient Medications on File Prior to Visit   Medication Sig Dispense Refill    ALPRAZolam (XANAX) 0.25 MG tablet Take 0.25 mg by mouth 3 (three) times daily.      bisoprolol (ZEBETA) 5 MG tablet   See Instructions, TAKE 1 TABLET BY MOUTH EVERY DAY, # 90 tab(s), 3 Refill(s), Pharmacy: [facility]/pharmacy #[id number], 163, cm, Height/Length Dosing, 03/31/22 10:47:00 CDT, 69, kg, Weight Dosing, 03/31/22 10:47:00 CDT      cholecalciferol, vitamin D3, 1,250 mcg (50,000 unit) capsule Take 50,000 Units by mouth every 7 days.      glimepiride (AMARYL) 2 MG tablet       JANUVIA 100 mg Tab Take 100 mg by mouth every evening.      ramipriL (ALTACE) 10 MG capsule Take 10 mg by mouth once daily.      triamterene-hydrochlorothiazide 37.5-25 mg (DYAZIDE) 37.5-25 mg per capsule Take 1 capsule by mouth once daily.       No current facility-administered medications on file prior to visit.     Social  "History     Socioeconomic History    Marital status:    Tobacco Use    Smoking status: Never    Smokeless tobacco: Never   Substance and Sexual Activity    Alcohol use: Not Currently    Drug use: Never    Sexual activity: Not Currently     History reviewed. No pertinent family history.    Review of Systems  Review of Systems   Constitutional: Negative.    HENT: Negative.    Eyes: Negative.    Respiratory: Negative.    Cardiovascular: Negative.    Gastrointestinal: Negative.    Genitourinary: Negative.    Musculoskeletal: Negative.    Neurological: Negative.    Endo/Heme/Allergies: Negative.    Psychiatric/Behavioral: Negative.    Objective:   /60   Pulse 72   Ht 5' 4" (1.626 m)   Wt 67.1 kg (148 lb)   SpO2 97%   BMI 25.40 kg/m²         Physical Exam  Vitals and nursing note reviewed.   Constitutional:       General: He is not in acute distress.     Appearance: Normal appearance.   HENT:      Head: Normocephalic and atraumatic.      Right Ear: Tympanic membrane and ear canal normal.      Left Ear: Tympanic membrane and ear canal normal.      Nose: Nose normal.      Mouth/Throat:      Pharynx: Oropharynx is clear. No oropharyngeal exudate or posterior oropharyngeal erythema.   Eyes:      Extraocular Movements: Extraocular movements intact.      Pupils: Pupils are equal, round, and reactive to light.   Cardiovascular:      Rate and Rhythm: Normal rate and regular rhythm.      Pulses: Normal pulses.      Heart sounds: Normal heart sounds. No murmur heard.    No friction rub. No gallop.   Pulmonary:      Effort: Pulmonary effort is normal. No respiratory distress.      Breath sounds: Normal breath sounds.   Abdominal:      General: Abdomen is flat. Bowel sounds are normal.      Palpations: Abdomen is soft.   Genitourinary:     Rectum: Normal.   Musculoskeletal:         General: Normal range of motion.      Cervical back: Normal range of motion and neck supple.   Skin:     General: Skin is warm.      " Capillary Refill: Capillary refill takes less than 2 seconds.   Neurological:      General: No focal deficit present.      Mental Status: He is alert and oriented to person, place, and time.   Psychiatric:         Mood and Affect: Mood normal.         Behavior: Behavior normal.         Thought Content: Thought content normal.         Judgment: Judgment normal.   Assessment:     1. Hyperlipidemia, unspecified hyperlipidemia type    2. Hypertension, unspecified type    3. Type 2 diabetes mellitus without complication, without long-term current use of insulin    4. Anemia, unspecified type      Plan:   I am having Angelia Dooley maintain her ALPRAZolam, bisoprolol, cholecalciferol (vitamin D3), glimepiride, ramipriL, JANUVIA, and triamterene-hydrochlorothiazide 37.5-25 mg.This patient is an established patient who has come in for an examination. She has done very well since her last visit to the office. She has a negative review of systems, except as mentioned above. She has not had any new problems to develop in the recent past. I was able to review her laboratory studies with her completely, as mentioned in the history of present illness. I will make medication changes as necessary, and her follow-up will continue as before.  Fortunately, I will not need to make any changes in her general care, based on the laboratory studies that I have reviewed with her today.  I will be able to continue her on the same medication is in the past, and I can provide refills of needed.      She admits that she is not eating as well as she should, because of the situational stresses regarding her mom's illness.  No medication can help her overcome this complaint for now, and I have asked her to continue to monitor this problem.      I discussed blood pressure management strategies with the patient today. I also recommend a low salt and low pork diet. We discussed antihypertensive medication. I have stressed an increase in physical  activity and exercise.    I held a discussion about cholesterol management with the patient today. The patient understands better than before and will try to change the medication regimen and diet.  Medication taken to lower cholesterol are best taken at bedtime.    I have made recommendations regarding better glycemic control with this patient today. The goal is to keep the blood sugar under a hemoglobin A1c of 7. Diet, medication, an increase in exercise or physical activity has been stressed.    Exercise is defined as 30 minutes of sustained activity performed at least 3 or 4 days each week.    20 minutes were needed to perform an H and P, and to review this patient's test that were taken. It also required an additional 10 minutes to complete this electronic health record, which totaled 30 minutes of time and spent with the patient.    This patient has received her flu shot already this year.    Problem List Items Addressed This Visit    None  Visit Diagnoses       Hyperlipidemia, unspecified hyperlipidemia type    -  Primary    Hypertension, unspecified type        Type 2 diabetes mellitus without complication, without long-term current use of insulin        Anemia, unspecified type                Angelia was seen today for follow-up.    Diagnoses and all orders for this visit:    Hyperlipidemia, unspecified hyperlipidemia type    Hypertension, unspecified type    Type 2 diabetes mellitus without complication, without long-term current use of insulin    Anemia, unspecified type    Other orders  The following orders have not been finalized:  -     ALPRAZolam (XANAX) 0.25 MG tablet

## 2022-10-17 NOTE — LETTER
AUTHORIZATION FOR RELEASE OF   CONFIDENTIAL INFORMATION    Dear Dr. Gasca,    We are seeing Angleia Dooley, date of birth 1955, in the clinic at [address]. Osvaldo Serrato MD is the patient's PCP. Angelia Dooley has an outstanding lab/procedure at the time we reviewed her chart. In order to help keep her health information updated, she has authorized us to request the following medical record(s):        (  )  MAMMOGRAM                                      ( X )  COLONOSCOPY      (  )  PAP SMEAR                                          (  )  OUTSIDE LAB RESULTS     (  )  DEXA SCAN                                          (  )  EYE EXAM            (  )  FOOT EXAM                                          (  )  ENTIRE RECORD     (  )  OUTSIDE IMMUNIZATIONS                 (  )  _______________         Please fax records to Osvaldo Serrato MD, [phone]     If you have any questions, please contact us at [phone].           Patient Name: Angelia Dooley  : 1955  Patient Phone #: [phone]

## 2022-10-24 ENCOUNTER — DOCUMENTATION ONLY (OUTPATIENT)
Dept: INTERNAL MEDICINE | Facility: CLINIC | Age: 67
End: 2022-10-24
Payer: MEDICARE

## 2022-12-06 ENCOUNTER — TELEPHONE (OUTPATIENT)
Dept: INTERNAL MEDICINE | Facility: CLINIC | Age: 67
End: 2022-12-06
Payer: MEDICARE

## 2022-12-06 NOTE — TELEPHONE ENCOUNTER
Patient advised to wear mask, gloves, take otc vit d, zinc, and vit c. She can go to any [facility] and get tested or the [facility] one near the [facility] center    ----- Message from Jhoana Jiménez, Patient Care Assistant sent at 12/6/2022  3:45 PM CST -----  Regarding: return call  Pt's mother tested positive for covid today and the pt is around her mother constantly. The pt took an at home test and she is negative and she isn't feeling any symptoms but she wants to know what she should do because she still has to be around her mom is there something preventative she can take? She always wears her masks constantly and the nursing home will make her suit up in appropriate attire but she is still wondering if there is something more she can do.    I did advise pt to try to maintain distance if she can to wash her hands with soap and water if she comes in contact with anything her mom has touched or if she touches her mom but I did tell her that you would be able to call her back to advise her on what to do a little more thoroughly or if there is something you can send for her preventative wise.    Phone #: [phone]

## 2023-03-06 ENCOUNTER — APPOINTMENT (OUTPATIENT)
Dept: LAB | Facility: HOSPITAL | Age: 68
End: 2023-03-06
Attending: INTERNAL MEDICINE
Payer: MEDICARE

## 2023-03-06 DIAGNOSIS — E55.9 VITAMIN D DEFICIENCY: Primary | ICD-10-CM

## 2023-03-06 DIAGNOSIS — E11.00 TYPE II DIABETES MELLITUS WITH HYPEROSMOLARITY, UNCONTROLLED: ICD-10-CM

## 2023-03-06 LAB
ALBUMIN SERPL-MCNC: 3.7 G/DL (ref 3.4–4.8)
ALBUMIN/GLOB SERPL: 1 RATIO (ref 1.1–2)
ALP SERPL-CCNC: 55 UNIT/L (ref 40–150)
ALT SERPL-CCNC: 16 UNIT/L (ref 0–55)
AST SERPL-CCNC: 19 UNIT/L (ref 5–34)
BILIRUBIN DIRECT+TOT PNL SERPL-MCNC: 0.6 MG/DL
BUN SERPL-MCNC: 17.7 MG/DL (ref 9.8–20.1)
CALCIUM SERPL-MCNC: 9.6 MG/DL (ref 8.4–10.2)
CHLORIDE SERPL-SCNC: 102 MMOL/L (ref 98–107)
CHOLEST SERPL-MCNC: 204 MG/DL
CHOLEST/HDLC SERPL: 4 {RATIO} (ref 0–5)
CO2 SERPL-SCNC: 28 MMOL/L (ref 23–31)
CREAT SERPL-MCNC: 0.83 MG/DL (ref 0.55–1.02)
CREAT UR-MCNC: 244.4 MG/DL (ref 47–110)
GFR SERPLBLD CREATININE-BSD FMLA CKD-EPI: >60 MLS/MIN/1.73/M2
GLOBULIN SER-MCNC: 3.7 GM/DL (ref 2.4–3.5)
GLUCOSE SERPL-MCNC: 158 MG/DL (ref 82–115)
HDLC SERPL-MCNC: 50 MG/DL (ref 35–60)
LDLC SERPL CALC-MCNC: 122 MG/DL (ref 50–140)
POTASSIUM SERPL-SCNC: 3.4 MMOL/L (ref 3.5–5.1)
PROT SERPL-MCNC: 7.4 GM/DL (ref 5.8–7.6)
PROT UR STRIP-MCNC: 16.1 MG/DL
SODIUM SERPL-SCNC: 138 MMOL/L (ref 136–145)
TRIGL SERPL-MCNC: 161 MG/DL (ref 37–140)
TSH SERPL-ACNC: 1.29 UIU/ML (ref 0.35–4.94)
VLDLC SERPL CALC-MCNC: 32 MG/DL

## 2023-03-06 PROCEDURE — 80061 LIPID PANEL: CPT

## 2023-03-06 PROCEDURE — 82570 ASSAY OF URINE CREATININE: CPT

## 2023-03-06 PROCEDURE — 84156 ASSAY OF PROTEIN URINE: CPT

## 2023-03-06 PROCEDURE — 80053 COMPREHEN METABOLIC PANEL: CPT

## 2023-03-06 PROCEDURE — 36415 COLL VENOUS BLD VENIPUNCTURE: CPT

## 2023-03-06 PROCEDURE — 84443 ASSAY THYROID STIM HORMONE: CPT

## 2023-05-02 RX ORDER — ALPRAZOLAM 0.25 MG/1
0.25 TABLET ORAL 3 TIMES DAILY
Qty: 90 TABLET | Refills: 0 | Status: SHIPPED | OUTPATIENT
Start: 2023-05-02 | End: 2023-12-05 | Stop reason: SDUPTHER

## 2023-05-02 NOTE — TELEPHONE ENCOUNTER
Sent to Dr. Villalta to send to pharm    ----- Message from Stacey Nelson sent at 5/2/2023  2:23 PM CDT -----  .Type:  RX Refill Request    Who Called: pt  Refill or New Rx:refill  RX Name and Strength:ALPRAZolam (XANAX) 0.25 MG tablet  How is the patient currently taking it? . 1XDay  Is this a 30 day or 90 day RX:30  Preferred Pharmacy with phone number:[facility]/PHARMACY #[id number]  [place] - [address]  Local or Mail Order:local  Ordering Provider:Rojelio  Would the patient rather a call back or a response via [facility]? Call back   Best Call Back Number:[phone]  Additional Information: pt requesting a refill

## 2023-05-30 ENCOUNTER — TELEPHONE (OUTPATIENT)
Dept: INTERNAL MEDICINE | Facility: CLINIC | Age: 68
End: 2023-05-30

## 2023-06-05 LAB
LEFT EYE DM RETINOPATHY: NEGATIVE
RIGHT EYE DM RETINOPATHY: NEGATIVE

## 2023-06-06 ENCOUNTER — OFFICE VISIT (OUTPATIENT)
Dept: INTERNAL MEDICINE | Facility: CLINIC | Age: 68
End: 2023-06-06
Payer: MEDICARE

## 2023-06-06 VITALS
DIASTOLIC BLOOD PRESSURE: 70 MMHG | HEIGHT: 64 IN | SYSTOLIC BLOOD PRESSURE: 122 MMHG | RESPIRATION RATE: 16 BRPM | HEART RATE: 71 BPM | OXYGEN SATURATION: 97 % | WEIGHT: 153 LBS | BODY MASS INDEX: 26.12 KG/M2

## 2023-06-06 DIAGNOSIS — I10 PRIMARY HYPERTENSION: ICD-10-CM

## 2023-06-06 DIAGNOSIS — E11.9 TYPE 2 DIABETES MELLITUS WITHOUT COMPLICATION, WITHOUT LONG-TERM CURRENT USE OF INSULIN: Primary | ICD-10-CM

## 2023-06-06 PROCEDURE — 99214 OFFICE O/P EST MOD 30 MIN: CPT | Mod: ,,, | Performed by: STUDENT IN AN ORGANIZED HEALTH CARE EDUCATION/TRAINING PROGRAM

## 2023-06-06 PROCEDURE — 99214 PR OFFICE/OUTPT VISIT, EST, LEVL IV, 30-39 MIN: ICD-10-PCS | Mod: ,,, | Performed by: STUDENT IN AN ORGANIZED HEALTH CARE EDUCATION/TRAINING PROGRAM

## 2023-06-06 NOTE — PROGRESS NOTES
Subjective:      Angelia Dooley  06/06/2023  [id number]      Chief Complaint: Establish Care (New pt establishment)       HPI:  Ms Galan is a 68 y/o female patient who is here to establish care. Patient is transitioning from Dr Serrato. Patient has hypertension, type 2 diabeter. No complaints today.     Past Medical History:   Diagnosis Date    Diabetes mellitus, type 2     Hyperlipidemia     Hypertension     Personal history of colonic polyps 02/16/2022     Past Surgical History:   Procedure Laterality Date    CHOLECYSTECTOMY      COLONOSCOPY W/ POLYPECTOMY  02/16/2022    HYSTERECTOMY      MYOMECTOMY       History reviewed. No pertinent family history.  Social History     Tobacco Use    Smoking status: Never    Smokeless tobacco: Never   Substance and Sexual Activity    Alcohol use: Not Currently    Drug use: Never    Sexual activity: Not Currently     Review of patient's allergies indicates:   Allergen Reactions    Acetaminophen-codeine     Cyclobenzaprine     Hydrocodone-acetaminophen     Tramadol        The following were reviewed at this visit: active problem list, medication list, allergies, family history, social history, and health maintenance.    Medications:    Current Outpatient Medications:     ALPRAZolam (XANAX) 0.25 MG tablet, Take 1 tablet (0.25 mg total) by mouth 3 (three) times daily., Disp: 90 tablet, Rfl: 0    bisoprolol (ZEBETA) 5 MG tablet,  See Instructions, TAKE 1 TABLET BY MOUTH EVERY DAY, # 90 tab(s), 3 Refill(s), Pharmacy: [facility]/pharmacy #[id number], 163, cm, Height/Length Dosing, 03/31/22 10:47:00 CDT, 69, kg, Weight Dosing, 03/31/22 10:47:00 CDT, Disp: , Rfl:     cholecalciferol, vitamin D3, 1,250 mcg (50,000 unit) capsule, Take 50,000 Units by mouth every 7 days., Disp: , Rfl:     glimepiride (AMARYL) 2 MG tablet, , Disp: , Rfl:     JANUVIA 100 mg Tab, Take 100 mg by mouth every evening., Disp: , Rfl:     ramipriL (ALTACE) 10 MG capsule, Take 10 mg by mouth once daily., Disp: , Rfl:  "    triamterene-hydrochlorothiazide 37.5-25 mg (DYAZIDE) 37.5-25 mg per capsule, Take 1 capsule by mouth once daily., Disp: , Rfl:     vitamin E 100 UNIT capsule, Take 100 Units by mouth once daily., Disp: , Rfl:       Medications have been reviewed and reconciled with patient at this visit.  Barriers to medications reviewed with patient.    Adverse reactions to current medications reviewed with patient..    Over the counter medications reviewed and reconciled with patient.  Review of Systems   Constitutional:  Negative for chills, fever, malaise/fatigue and weight loss.   HENT:  Negative for congestion, ear discharge, ear pain, hearing loss, sinus pain and sore throat.    Eyes:  Negative for photophobia, pain, discharge and redness.   Respiratory:  Negative for cough, shortness of breath and wheezing.    Cardiovascular:  Negative for chest pain, palpitations and leg swelling.   Gastrointestinal:  Negative for constipation, diarrhea, heartburn, nausea and vomiting.   Genitourinary:  Negative for dysuria, frequency and urgency.   Musculoskeletal:  Negative for falls, joint pain and myalgias.   Skin:  Negative for itching and rash.   Neurological:  Negative for dizziness, focal weakness, weakness and headaches.   Psychiatric/Behavioral:  Negative for depression and memory loss. The patient is not nervous/anxious and does not have insomnia.          Objective:      Vitals:    06/06/23 1335   BP: 122/70   BP Location: Left arm   Patient Position: Sitting   BP Method: Small (Automatic)   Pulse: 71   Resp: 16   SpO2: 97%   Weight: 69.4 kg (153 lb)   Height: 5' 4" (1.626 m)       Physical Exam  Constitutional:       General: She is not in acute distress.     Appearance: Normal appearance.   HENT:      Head: Normocephalic and atraumatic.   Eyes:      Extraocular Movements: Extraocular movements intact.      Pupils: Pupils are equal, round, and reactive to light.   Cardiovascular:      Rate and Rhythm: Normal rate and " regular rhythm.      Pulses: Normal pulses.      Heart sounds: Normal heart sounds. No murmur heard.    No friction rub. No gallop.   Pulmonary:      Effort: Pulmonary effort is normal.      Breath sounds: Normal breath sounds. No wheezing, rhonchi or rales.   Abdominal:      General: Abdomen is flat. Bowel sounds are normal. There is no distension.      Palpations: Abdomen is soft.      Tenderness: There is no abdominal tenderness.   Musculoskeletal:         General: No swelling or tenderness. Normal range of motion.      Cervical back: Normal range of motion and neck supple. No tenderness.      Right lower leg: No edema.      Left lower leg: No edema.   Lymphadenopathy:      Cervical: No cervical adenopathy.   Skin:     Findings: No lesion or rash.   Neurological:      General: No focal deficit present.      Mental Status: She is alert and oriented to person, place, and time.      Cranial Nerves: No cranial nerve deficit.      Sensory: No sensory deficit.      Motor: No weakness.   Psychiatric:         Mood and Affect: Mood normal.         Behavior: Behavior normal.         Thought Content: Thought content normal.             Assessment and Plan:       1. Type 2 diabetes mellitus without complication, without long-term current use of insulin  Assessment & Plan:  Controlled per last A1C  Continue current medications  Will recheck A1C  LDL above goal per last lipid panel, recommended starting statin at this time, patient does not wish to start at this time. Will discuss at later visits     Orders:  -     Hemoglobin A1C; Future; Expected date: 06/06/2023    2. Primary hypertension  Assessment & Plan:  Controlled  Continue current medications               Follow up: Follow up in about 3 months (around 9/6/2023) for wellness.

## 2023-06-07 NOTE — ASSESSMENT & PLAN NOTE
Controlled per last A1C  Continue current medications  Will recheck A1C  LDL above goal per last lipid panel, recommended starting statin at this time, patient does not wish to start at this time. Will discuss at later visits

## 2023-07-21 DIAGNOSIS — I10 PRIMARY HYPERTENSION: Primary | ICD-10-CM

## 2023-07-21 RX ORDER — BISOPROLOL FUMARATE 5 MG/1
TABLET, FILM COATED ORAL
Qty: 90 TABLET | Refills: 3 | Status: SHIPPED | OUTPATIENT
Start: 2023-07-21

## 2023-07-21 NOTE — TELEPHONE ENCOUNTER
Spoke with the pt, pt needed an refill on medication. I have sent it to the pharmacy per pt request.

## 2023-08-07 ENCOUNTER — LAB VISIT (OUTPATIENT)
Dept: LAB | Facility: HOSPITAL | Age: 68
End: 2023-08-07
Attending: INTERNAL MEDICINE
Payer: MEDICARE

## 2023-08-07 DIAGNOSIS — E55.9 VITAMIN D DEFICIENCY: ICD-10-CM

## 2023-08-07 DIAGNOSIS — F41.9 ANXIETY HYPERVENTILATION: ICD-10-CM

## 2023-08-07 DIAGNOSIS — F45.8 ANXIETY HYPERVENTILATION: ICD-10-CM

## 2023-08-07 DIAGNOSIS — R30.0 DYSURIA: ICD-10-CM

## 2023-08-07 DIAGNOSIS — E11.00 TYPE II DIABETES MELLITUS WITH HYPEROSMOLARITY, UNCONTROLLED: Primary | ICD-10-CM

## 2023-08-07 LAB
ALBUMIN SERPL-MCNC: 3.8 G/DL (ref 3.4–4.8)
ALBUMIN/GLOB SERPL: 1 RATIO (ref 1.1–2)
ALP SERPL-CCNC: 54 UNIT/L (ref 40–150)
ALT SERPL-CCNC: 15 UNIT/L (ref 0–55)
APPEARANCE UR: CLEAR
AST SERPL-CCNC: 21 UNIT/L (ref 5–34)
BACTERIA #/AREA URNS AUTO: ABNORMAL /HPF
BILIRUB UR QL STRIP.AUTO: NEGATIVE
BILIRUBIN DIRECT+TOT PNL SERPL-MCNC: 0.7 MG/DL
BUN SERPL-MCNC: 17.4 MG/DL (ref 9.8–20.1)
CALCIUM SERPL-MCNC: 10 MG/DL (ref 8.4–10.2)
CHLORIDE SERPL-SCNC: 100 MMOL/L (ref 98–107)
CO2 SERPL-SCNC: 31 MMOL/L (ref 23–31)
COLOR UR: YELLOW
CREAT SERPL-MCNC: 0.97 MG/DL (ref 0.55–1.02)
DEPRECATED CALCIDIOL+CALCIFEROL SERPL-MC: 96.1 NG/ML (ref 30–80)
EST. AVERAGE GLUCOSE BLD GHB EST-MCNC: 171.4 MG/DL
GFR SERPLBLD CREATININE-BSD FMLA CKD-EPI: >60 MLS/MIN/1.73/M2
GLOBULIN SER-MCNC: 3.9 GM/DL (ref 2.4–3.5)
GLUCOSE SERPL-MCNC: 196 MG/DL (ref 82–115)
GLUCOSE UR QL STRIP.AUTO: NEGATIVE
HBA1C MFR BLD: 7.6 %
KETONES UR QL STRIP.AUTO: NEGATIVE
LEUKOCYTE ESTERASE UR QL STRIP.AUTO: ABNORMAL
NITRITE UR QL STRIP.AUTO: NEGATIVE
PH UR STRIP.AUTO: 5.5 [PH]
POTASSIUM SERPL-SCNC: 4 MMOL/L (ref 3.5–5.1)
PROT SERPL-MCNC: 7.7 GM/DL (ref 5.8–7.6)
PROT UR QL STRIP.AUTO: NEGATIVE
RBC #/AREA URNS AUTO: ABNORMAL /HPF
RBC UR QL AUTO: NEGATIVE
SODIUM SERPL-SCNC: 138 MMOL/L (ref 136–145)
SP GR UR STRIP.AUTO: 1.02 (ref 1–1.03)
SQUAMOUS #/AREA URNS AUTO: <5 /HPF
UROBILINOGEN UR STRIP-ACNC: 1
WBC #/AREA URNS AUTO: 6 /HPF

## 2023-08-07 PROCEDURE — 80053 COMPREHEN METABOLIC PANEL: CPT

## 2023-08-07 PROCEDURE — 36415 COLL VENOUS BLD VENIPUNCTURE: CPT

## 2023-08-07 PROCEDURE — 81001 URINALYSIS AUTO W/SCOPE: CPT

## 2023-08-07 PROCEDURE — 82306 VITAMIN D 25 HYDROXY: CPT

## 2023-08-07 PROCEDURE — 83036 HEMOGLOBIN GLYCOSYLATED A1C: CPT

## 2023-09-11 ENCOUNTER — OFFICE VISIT (OUTPATIENT)
Dept: INTERNAL MEDICINE | Facility: CLINIC | Age: 68
End: 2023-09-11
Payer: MEDICARE

## 2023-09-11 VITALS
HEIGHT: 64 IN | TEMPERATURE: 98 F | OXYGEN SATURATION: 97 % | DIASTOLIC BLOOD PRESSURE: 62 MMHG | SYSTOLIC BLOOD PRESSURE: 122 MMHG | WEIGHT: 152 LBS | HEART RATE: 67 BPM | BODY MASS INDEX: 25.95 KG/M2

## 2023-09-11 DIAGNOSIS — Z00.00 MEDICARE ANNUAL WELLNESS VISIT, SUBSEQUENT: Primary | ICD-10-CM

## 2023-09-11 DIAGNOSIS — I10 PRIMARY HYPERTENSION: ICD-10-CM

## 2023-09-11 DIAGNOSIS — G47.00 INSOMNIA, UNSPECIFIED TYPE: ICD-10-CM

## 2023-09-11 DIAGNOSIS — E11.9 TYPE 2 DIABETES MELLITUS WITHOUT COMPLICATION, WITHOUT LONG-TERM CURRENT USE OF INSULIN: ICD-10-CM

## 2023-09-11 PROCEDURE — G0439 PPPS, SUBSEQ VISIT: HCPCS | Mod: ,,, | Performed by: STUDENT IN AN ORGANIZED HEALTH CARE EDUCATION/TRAINING PROGRAM

## 2023-09-11 PROCEDURE — G0439 PR MEDICARE ANNUAL WELLNESS SUBSEQUENT VISIT: ICD-10-PCS | Mod: ,,, | Performed by: STUDENT IN AN ORGANIZED HEALTH CARE EDUCATION/TRAINING PROGRAM

## 2023-09-11 RX ORDER — TRAZODONE HYDROCHLORIDE 50 MG/1
25 TABLET ORAL NIGHTLY
Qty: 15 TABLET | Refills: 3 | Status: SHIPPED | OUTPATIENT
Start: 2023-09-11 | End: 2023-12-05

## 2023-09-13 PROBLEM — Z00.00 MEDICARE ANNUAL WELLNESS VISIT, SUBSEQUENT: Status: ACTIVE | Noted: 2023-09-13

## 2023-09-13 PROBLEM — G47.00 INSOMNIA: Status: ACTIVE | Noted: 2023-09-13

## 2023-09-13 NOTE — PROGRESS NOTES
Subjective:      Angelia Dooley  09/13/2023  [id number]    Daksha Del Rio MD  Patient Care Team:  Daksha Del Rio MD as PCP - General (Internal Medicine)          Visit Type:a scheduled routine follow-up visit    Chief Complaint: Medicare AWV    HPI  Ms Galan presents for Medicare wellness visit. Patient is complaining of trouble sleeping, would like to try taking medication. No other complaints.     Past Medical History:   Diagnosis Date    Diabetes mellitus, type 2     Hyperlipidemia     Hypertension     Personal history of colonic polyps 02/16/2022     Past Surgical History:   Procedure Laterality Date    CHOLECYSTECTOMY      COLONOSCOPY W/ POLYPECTOMY  02/16/2022    HYSTERECTOMY      MYOMECTOMY       History reviewed. No pertinent family history.  Social History     Tobacco Use    Smoking status: Never    Smokeless tobacco: Never   Substance and Sexual Activity    Alcohol use: Not Currently    Drug use: Never    Sexual activity: Not Currently     Active Problem List with Overview Notes    Diagnosis Date Noted    Medicare annual wellness visit, subsequent 09/13/2023    Insomnia 09/13/2023    Primary hypertension 06/06/2023    Type 2 diabetes mellitus 06/06/2023     Review of patient's allergies indicates:   Allergen Reactions    Acetaminophen-codeine     Cyclobenzaprine     Hydrocodone-acetaminophen     Tramadol     Versed [midazolam] Nausea Only       The following were reviewed at this visit: active problem list, medication list, allergies, family history, social history, and health maintenance.    Medications:    Current Outpatient Medications:     ALPRAZolam (XANAX) 0.25 MG tablet, Take 1 tablet (0.25 mg total) by mouth 3 (three) times daily., Disp: 90 tablet, Rfl: 0    bisoprolol (ZEBETA) 5 MG tablet, See Instructions, TAKE 1 TABLET BY MOUTH EVERY DAY, # 90 tab(s), 3 Refill(s), Pharmacy: [id number], 163, cm, Height/Length Dosing, 03/31/22 10:47:00 CDT, 69, kg, Weight Dosing,  03/31/22 10:47:00 CDT, Disp: 90 tablet, Rfl: 3    glimepiride (AMARYL) 4 MG tablet, Take 4 mg by mouth 2 (two) times daily., Disp: , Rfl:     JANUVIA 100 mg Tab, Take 100 mg by mouth every evening., Disp: , Rfl:     ramipriL (ALTACE) 10 MG capsule, Take 10 mg by mouth once daily., Disp: , Rfl:     triamterene-hydrochlorothiazide 37.5-25 mg (DYAZIDE) 37.5-25 mg per capsule, Take 1 capsule by mouth once daily., Disp: , Rfl:     vitamin E 100 UNIT capsule, Take 100 Units by mouth once daily., Disp: , Rfl:     cholecalciferol, vitamin D3, 1,250 mcg (50,000 unit) capsule, Take 50,000 Units by mouth every 7 days., Disp: , Rfl:     traZODone (DESYREL) 50 MG tablet, Take 0.5 tablets (25 mg total) by mouth every evening., Disp: 15 tablet, Rfl: 3      Medications have been reviewed and reconciled with patient at this visit.  Barriers to medications reviewed with patient.    Adverse reactions to current medications reviewed with patient..    Over the counter medications reviewed and reconciled with patient.    Opioid Screening: Patient medication list reviewed, patient is not taking prescription opioids. Patient is not using additional opioids than prescribed. Patient is at low risk of substance abuse based on this opioid use history.     Review of Systems   Constitutional:  Negative for chills, fever, malaise/fatigue and weight loss.   HENT:  Negative for congestion, ear discharge, ear pain, hearing loss, sinus pain and sore throat.    Eyes:  Negative for photophobia, pain, discharge and redness.   Respiratory:  Negative for cough, shortness of breath and wheezing.    Cardiovascular:  Negative for chest pain, palpitations and leg swelling.   Gastrointestinal:  Negative for constipation, diarrhea, heartburn, nausea and vomiting.   Genitourinary:  Negative for dysuria, frequency and urgency.   Musculoskeletal:  Negative for falls, joint pain and myalgias.   Skin:  Negative for itching and rash.   Neurological:  Negative for  dizziness, focal weakness, weakness and headaches.   Psychiatric/Behavioral:  Negative for depression and memory loss. The patient is not nervous/anxious and does not have insomnia.        What [age] 65-70  Are you male or female?: Female  During the past four weeks, how much have you been bothered by emotional problems such as feeling anxious, depressed, irritable, sad, or downhearted and blue?: Not at all  During the past five weeks, has your physical and/or emotional health limited your social activities with family, friends, neighbors, or groups?: Not at all  During the past four weeks, how much bodily pain have you generally had?: Mild pain  During the past four weeks, was someone available to help if you needed and wanted help?: Yes, as much as I wanted  During the past four weeks, what was the hardest physical activity you could do for at least two minutes?: Light  Can you get to places out of walking distance without help?  (For example, can you travel alone on buses or taxis, or drive your own car?): Yes  Can you go shopping for groceries or clothes without someone's help?: Yes  Can you prepare your own meals?: Yes  Can you do your own housework without help?: Yes  Because of any health problems, do you need the help of another person with your personal care needs such as eating, bathing, dressing, or getting around the house?: No  Can you handle your own money without help?: Yes  During the past four weeks, how would you rate your health in general?: Very good  How have things been going for you during the past four weeks?: Pretty well  Are you having difficulties driving your car?: No  Do you always fasten your seat belt when you are in a car?: Yes, usually  How often in the past four weeks have you been bothered by falling or dizzy when standing up?: Seldom  How often in the past four weeks have you been bothered by sexual problems?: Seldom  How often in the past four weeks have you been bothered  "by trouble eating well?: Never  How often in the past four weeks have you been bothered by teeth or denture problems?: Never  How often in the past four weeks have you been bothered with problems using the telephone?: Never  How often in the past four weeks have you been bothered by tiredness or fatigue?: Seldom  Have you fallen two or more times in the past year?: No  Are you afraid of falling?: No  Are you a smoker?: No  During the past four weeks, how many drinks of wine, beer, or other alcoholic beverages did you have?: No alcohol at all  Do you exercise for about 20 minutes three or more days a week?: No, I usually do not exercise this much  Have you been given any information to help you with hazards in your house that might hurt you?: Yes  Have you been given any information to help you with keeping track of your medications?: Yes  How often do you have trouble taking medicines the way you've been told to take them?: I always take them as prescribed  How confident are you that you can control and manage most of your health problems?: Very confident  What is your race? (Check all that apply.):           Objective:      Vitals:    09/11/23 1446   BP: 122/62   Pulse: 67   Temp: 97.6 °F (36.4 °C)   TempSrc: Temporal   SpO2: 97%   Weight: 68.9 kg (152 lb)   Height: 5' 4" (1.626 m)       Physical Exam  Constitutional:       General: She is not in acute distress.     Appearance: Normal appearance.   HENT:      Head: Normocephalic and atraumatic.   Eyes:      Extraocular Movements: Extraocular movements intact.      Pupils: Pupils are equal, round, and reactive to light.   Cardiovascular:      Rate and Rhythm: Normal rate and regular rhythm.      Pulses: Normal pulses.      Heart sounds: Normal heart sounds. No murmur heard.     No friction rub. No gallop.   Pulmonary:      Effort: Pulmonary effort is normal.      Breath sounds: Normal breath sounds. No wheezing, rhonchi or rales.   Abdominal:      " General: Abdomen is flat. Bowel sounds are normal. There is no distension.      Palpations: Abdomen is soft.      Tenderness: There is no abdominal tenderness.   Musculoskeletal:         General: No swelling or tenderness. Normal range of motion.      Cervical back: Normal range of motion and neck supple. No tenderness.      Right lower leg: No edema.      Left lower leg: No edema.   Lymphadenopathy:      Cervical: No cervical adenopathy.   Skin:     Findings: No lesion or rash.   Neurological:      General: No focal deficit present.      Mental Status: She is alert and oriented to person, place, and time.      Cranial Nerves: No cranial nerve deficit.      Sensory: No sensory deficit.      Motor: No weakness.   Psychiatric:         Mood and Affect: Mood normal.         Behavior: Behavior normal.         Thought Content: Thought content normal.              No data to display                  6/6/2023     1:40 PM 10/17/2022     8:40 AM 7/26/2022     8:00 AM   Fall Risk Assessment - Outpatient   Mobility Status Ambulatory Ambulatory Ambulatory   Number of falls 0 0 0   Identified as fall risk False False False                 Depression Screening  Over the past two weeks, has the patient felt down, depressed, or hopeless?: No  Over the past two weeks, has the patient felt little interest or pleasure in doing things?: No  Functional Ability/Safety Screening  Was the patient's timed Up & Go test unsteady or longer than 30 seconds?: No  Does the patient need help with phone, transportation, shopping, preparing meals, housework, laundry, meds, or managing money?: No  Does the patient's home have rugs in the hallway, lack grab bars in the bathroom, lack handrails on the stairs or have poor lighting?: No  Have you noticed any hearing difficulties?: No  Cognitive Function (Assessed through direct observation with due consideration of information obtained by way of patient reports and/or concerns raised by family, friends,  caretakers, or others)    Does the patient repeat questions/statements in the same day?: No  Does the patient have trouble remembering the date, year, and time?: No  Does the patient have difficulty managing finances?: No  Does the patient have a decreased sense of direction?: No        Laboratory Reviewed ({Yes)  Lab Results   Component Value Date    WBC 4.4 (L) 10/11/2022    HGB 12.2 10/11/2022    HCT 37.0 10/11/2022     10/11/2022    CHOL 204 (H) 03/06/2023    TRIG 161 (H) 03/06/2023    HDL 50 03/06/2023    ALT 15 08/07/2023    AST 21 08/07/2023     08/07/2023    K 4.0 08/07/2023    CREATININE 0.97 08/07/2023    BUN 17.4 08/07/2023    CO2 31 08/07/2023    TSH 1.289 03/06/2023    HGBA1C 7.6 (H) 08/07/2023         Assessment and Plan:       Problem List Items Addressed This Visit          Cardiac/Vascular    Primary hypertension     Controlled  Continue current medications             Endocrine    Type 2 diabetes mellitus     Uncontrolled per last A1C in 08/2023  Continue current medications for now  Will focus on stricter diet control  Will check again in 3 months             Other    Medicare annual wellness visit, subsequent - Primary     Mammogram: done 11/2022  DXA scan: done 08/2022  Colonoscopy: done 02/2022  Labs: done prior to visit, reviewed with patient          Insomnia     Will start trazodone 25 mg QHS               Care Plan/Goals: Reviewed    Goals    None         Follow up: Follow up in about 3 months (around 12/11/2023) for Medication f/u.    No orders of the defined types were placed in this encounter.      Medicare Annual Wellness and Personalized Prevention Plan:   Fall Risk + Home Safety + Hearing Impairment + Depression Screen + Cognitive Impairment Screen + Health Risk Assessment all reviewed.     Health Maintenance Topics with due status: Not Due       Topic Last Completion Date    Colorectal Cancer Screening 02/16/2022    DEXA Scan 08/04/2022    Lipid Panel 03/06/2023    Eye  Exam 06/05/2023    Hemoglobin A1c 08/07/2023      The patient's Health Maintenance was reviewed and the following appears to be due at this time:   Health Maintenance Due   Topic Date Due    Hepatitis C Screening  Never done    TETANUS VACCINE  Never done    COVID-19 Vaccine (6 - Moderna series) 02/05/2023    Foot Exam  07/26/2023    Diabetes Urine Screening  10/11/2023    Mammogram  11/29/2023       Advance Care Planning   I attest to discussing Advance Care Planning with patient and/or family member.  Education was provided including the importance of the Health Care Power of , Advance Directives, and/or [place] documentation.  The patient expressed understanding to the importance of this information and discussion.

## 2023-09-13 NOTE — ASSESSMENT & PLAN NOTE
Uncontrolled per last A1C in 08/2023  Continue current medications for now  Will focus on stricter diet control  Will check again in 3 months

## 2023-09-13 NOTE — ASSESSMENT & PLAN NOTE
Mammogram: done 11/2022  DXA scan: done 08/2022  Colonoscopy: done 02/2022  Labs: done prior to visit, reviewed with patient

## 2023-10-02 ENCOUNTER — TELEPHONE (OUTPATIENT)
Dept: INTERNAL MEDICINE | Facility: CLINIC | Age: 68
End: 2023-10-02
Payer: COMMERCIAL

## 2023-10-02 NOTE — TELEPHONE ENCOUNTER
Spoke with patient she reported  not having an appetite for 5-7 days. She stated she has been dealing with a lot of stress a taking care of her mother. I offer the patient an appointment with one of the NPS,but she declines it. Although I advised the patient I could send the message to , she won't be back in the office until next week. Patient stated she will wait until  return back into the office.  Patient wanted to know what kind of vitam's she can take? Please advise?

## 2023-10-02 NOTE — TELEPHONE ENCOUNTER
----- Message from Stacey Nelson sent at 10/2/2023 11:21 AM CDT -----  .Type:  Needs Medical Advice    Who Called: pt  Symptoms (please be specific): not hungry, stressed    How long has patient had these symptoms:  5 to 7 days  Pharmacy name and phone #:  [id number] - [place], [place] - [address] AT [facility] [facility]  Would the patient rather a call back or a response via [facility]? Call back   Best Call Back Number: [phone]  Additional Information: pt is requesting a vitamin  to help with her appetite

## 2023-10-09 ENCOUNTER — TELEPHONE (OUTPATIENT)
Dept: INTERNAL MEDICINE | Facility: CLINIC | Age: 68
End: 2023-10-09
Payer: COMMERCIAL

## 2023-10-09 ENCOUNTER — OFFICE VISIT (OUTPATIENT)
Dept: URGENT CARE | Facility: CLINIC | Age: 68
End: 2023-10-09
Payer: MEDICARE

## 2023-10-09 VITALS
HEIGHT: 64 IN | TEMPERATURE: 101 F | HEART RATE: 92 BPM | OXYGEN SATURATION: 99 % | BODY MASS INDEX: 25.61 KG/M2 | SYSTOLIC BLOOD PRESSURE: 131 MMHG | RESPIRATION RATE: 20 BRPM | WEIGHT: 150 LBS | DIASTOLIC BLOOD PRESSURE: 74 MMHG

## 2023-10-09 DIAGNOSIS — J06.9 UPPER RESPIRATORY TRACT INFECTION, UNSPECIFIED TYPE: Primary | ICD-10-CM

## 2023-10-09 DIAGNOSIS — R50.9 FEVER, UNSPECIFIED FEVER CAUSE: ICD-10-CM

## 2023-10-09 DIAGNOSIS — R05.9 COUGH, UNSPECIFIED TYPE: ICD-10-CM

## 2023-10-09 LAB
CTP QC/QA: YES
CTP QC/QA: YES
POC MOLECULAR INFLUENZA A AGN: NEGATIVE
POC MOLECULAR INFLUENZA B AGN: NEGATIVE
SARS-COV-2 RDRP RESP QL NAA+PROBE: NEGATIVE

## 2023-10-09 PROCEDURE — 99214 OFFICE O/P EST MOD 30 MIN: CPT | Mod: S$PBB,,, | Performed by: FAMILY MEDICINE

## 2023-10-09 PROCEDURE — 99214 OFFICE O/P EST MOD 30 MIN: CPT | Mod: PBBFAC | Performed by: FAMILY MEDICINE

## 2023-10-09 PROCEDURE — 87635 SARS-COV-2 COVID-19 AMP PRB: CPT | Mod: PBBFAC | Performed by: FAMILY MEDICINE

## 2023-10-09 PROCEDURE — 99214 PR OFFICE/OUTPT VISIT, EST, LEVL IV, 30-39 MIN: ICD-10-PCS | Mod: S$PBB,,, | Performed by: FAMILY MEDICINE

## 2023-10-09 PROCEDURE — 87502 INFLUENZA DNA AMP PROBE: CPT | Mod: PBBFAC | Performed by: FAMILY MEDICINE

## 2023-10-09 RX ORDER — PREDNISONE 20 MG/1
20 TABLET ORAL DAILY
Qty: 5 TABLET | Refills: 0 | Status: SHIPPED | OUTPATIENT
Start: 2023-10-09 | End: 2023-10-14

## 2023-10-09 RX ORDER — KETOCONAZOLE 20 MG/ML
SHAMPOO, SUSPENSION TOPICAL
COMMUNITY
Start: 2023-08-08 | End: 2023-12-05

## 2023-10-09 RX ORDER — BLOOD-GLUCOSE METER
EACH MISCELLANEOUS
COMMUNITY
Start: 2023-09-06

## 2023-10-09 NOTE — TELEPHONE ENCOUNTER
Patient advised that she would need to go to WIC, rotate tylenol and advil as needed for fever but definietly WIC or ER. Patient verbalize understanding

## 2023-10-09 NOTE — TELEPHONE ENCOUNTER
----- Message from Stacey Nelson sent at 10/9/2023  4:48 PM CDT -----  .Type:  Needs Medical Advice    Who Called: pt   Symptoms (please be specific): chills, no fever  had a cyst removed Saturday   How long has patient had these symptoms:  Saturday   Pharmacy name and phone #:  [id number] - [place] - [address] AT [facility] [facility]  Would the patient rather a call back or a response via [facility]? Call back   Best Call Back Number: [phone]  Additional Information: pt want appointment but she doesn't want to see  a NP

## 2023-10-10 NOTE — TELEPHONE ENCOUNTER
Please advise patient to supplement diet with Glucerna to increase amount of calories in a day. Recommend she takes a multivitamin as well.

## 2023-10-10 NOTE — PROGRESS NOTES
"Subjective:       Patient ID: Angelia Dooley is a 68 y.o. female.    Chief Complaint: URI (Cough, fever, chills and body aches x 3 days.)      URI       68-year-old female with cough, fever, chills, and body aches for 3 days.  Been ineffective.  This is similar to previous sinus infections.  She had a cyst removed up, and was placed on doxycycline today.  Review of Systems   Constitutional:         As above   HENT:          As above   Respiratory:          As above         Objective:       Vital Signs  Temp: (!) 101.3 °F (38.5 °C)  Temp Source: Oral  Pulse: 92  Resp: 20  SpO2: 99 %  BP: 131/74  Pain Score:   5  Pain Loc: Head (scalp)  Height and Weight  Height: 5' 4" (162.6 cm)  Weight: 68 kg (150 lb)  BSA (Calculated - sq m): 1.75 sq meters  BMI (Calculated): 25.7  Weight in (lb) to have BMI = 25: 145.3]  Physical Exam  Constitutional:       Appearance: Normal appearance.   HENT:      Head: Normocephalic and atraumatic.      Nose: Congestion present. No rhinorrhea.      Mouth/Throat:      Pharynx: Posterior oropharyngeal erythema present. No oropharyngeal exudate.   Eyes:      Extraocular Movements: Extraocular movements intact.      Conjunctiva/sclera: Conjunctivae normal.   Cardiovascular:      Rate and Rhythm: Normal rate and regular rhythm.      Heart sounds: Normal heart sounds.   Pulmonary:      Breath sounds: Normal breath sounds.   Musculoskeletal:      Cervical back: Tenderness present.   Lymphadenopathy:      Cervical: Cervical adenopathy present.   Skin:     General: Skin is warm and dry.   Neurological:      General: No focal deficit present.      Mental Status: She is alert.   Psychiatric:         Mood and Affect: Mood and affect normal.         Speech: Speech normal.         Behavior: Behavior normal. Behavior is cooperative.         Thought Content: Thought content does not include homicidal or suicidal ideation.         Assessment:       Problem List Items Addressed This Visit  "   None  Visit Diagnoses       Upper respiratory tract infection, unspecified type    -  Primary    Relevant Medications    predniSONE (DELTASONE) 20 MG tablet    Fever, unspecified fever cause        Relevant Orders    POCT Influenza A/B MOLECULAR (Completed)    POCT COVID-19 Rapid Screening (Completed)    Cough, unspecified type        Relevant Orders    POCT Influenza A/B MOLECULAR (Completed)    POCT COVID-19 Rapid Screening (Completed)            Plan:   Encouraged antihistamines as needed  Encouraged ibuprofen/acetaminophen as needed  ER precautions  Follow-up with PCP

## 2023-10-12 ENCOUNTER — HOSPITAL ENCOUNTER (EMERGENCY)
Facility: HOSPITAL | Age: 68
Discharge: HOME | End: 2023-10-12
Attending: EMERGENCY MEDICINE
Payer: MEDICARE

## 2023-10-12 VITALS
RESPIRATION RATE: 20 BRPM | BODY MASS INDEX: 26.58 KG/M2 | HEART RATE: 65 BPM | HEIGHT: 63 IN | SYSTOLIC BLOOD PRESSURE: 151 MMHG | WEIGHT: 150 LBS | TEMPERATURE: 98 F | OXYGEN SATURATION: 99 % | DIASTOLIC BLOOD PRESSURE: 72 MMHG

## 2023-10-12 DIAGNOSIS — R73.9 HYPERGLYCEMIA: Primary | ICD-10-CM

## 2023-10-12 DIAGNOSIS — L08.9: ICD-10-CM

## 2023-10-12 DIAGNOSIS — S00.02XS: ICD-10-CM

## 2023-10-12 LAB
ALBUMIN SERPL-MCNC: 3.6 G/DL (ref 3.4–4.8)
ALBUMIN/GLOB SERPL: 0.8 RATIO (ref 1.1–2)
ALP SERPL-CCNC: 73 UNIT/L (ref 40–150)
ALT SERPL-CCNC: 13 UNIT/L (ref 0–55)
APPEARANCE UR: CLEAR
AST SERPL-CCNC: 16 UNIT/L (ref 5–34)
BACTERIA #/AREA URNS AUTO: NORMAL /HPF
BASOPHILS # BLD AUTO: 0.06 X10(3)/MCL
BASOPHILS NFR BLD AUTO: 0.8 %
BILIRUB SERPL-MCNC: 0.2 MG/DL
BILIRUB UR QL STRIP.AUTO: NEGATIVE
BUN SERPL-MCNC: 16.7 MG/DL (ref 9.8–20.1)
CALCIUM SERPL-MCNC: 9.7 MG/DL (ref 8.4–10.2)
CHLORIDE SERPL-SCNC: 101 MMOL/L (ref 98–107)
CO2 SERPL-SCNC: 25 MMOL/L (ref 23–31)
COLOR UR AUTO: YELLOW
CREAT SERPL-MCNC: 1.25 MG/DL (ref 0.55–1.02)
EOSINOPHIL # BLD AUTO: 0.36 X10(3)/MCL (ref 0–0.9)
EOSINOPHIL NFR BLD AUTO: 4.6 %
ERYTHROCYTE [DISTWIDTH] IN BLOOD BY AUTOMATED COUNT: 13.2 % (ref 11.5–17)
FLUAV AG UPPER RESP QL IA.RAPID: NOT DETECTED
FLUBV AG UPPER RESP QL IA.RAPID: NOT DETECTED
GFR SERPLBLD CREATININE-BSD FMLA CKD-EPI: 47 MLS/MIN/1.73/M2
GLOBULIN SER-MCNC: 4.3 GM/DL (ref 2.4–3.5)
GLUCOSE SERPL-MCNC: 306 MG/DL (ref 82–115)
GLUCOSE UR QL STRIP.AUTO: ABNORMAL
HCT VFR BLD AUTO: 33.1 % (ref 37–47)
HGB BLD-MCNC: 11.2 G/DL (ref 12–16)
IMM GRANULOCYTES # BLD AUTO: 0.02 X10(3)/MCL (ref 0–0.04)
IMM GRANULOCYTES NFR BLD AUTO: 0.3 %
KETONES UR QL STRIP.AUTO: NEGATIVE
LEUKOCYTE ESTERASE UR QL STRIP.AUTO: NEGATIVE
LYMPHOCYTES # BLD AUTO: 1.79 X10(3)/MCL (ref 0.6–4.6)
LYMPHOCYTES NFR BLD AUTO: 22.9 %
MCH RBC QN AUTO: 30.1 PG (ref 27–31)
MCHC RBC AUTO-ENTMCNC: 33.8 G/DL (ref 33–36)
MCV RBC AUTO: 89 FL (ref 80–94)
MONOCYTES # BLD AUTO: 0.82 X10(3)/MCL (ref 0.1–1.3)
MONOCYTES NFR BLD AUTO: 10.5 %
NEUTROPHILS # BLD AUTO: 4.76 X10(3)/MCL (ref 2.1–9.2)
NEUTROPHILS NFR BLD AUTO: 60.9 %
NITRITE UR QL STRIP.AUTO: NEGATIVE
NRBC BLD AUTO-RTO: 0 %
PH UR STRIP.AUTO: 6 [PH]
PLATELET # BLD AUTO: 311 X10(3)/MCL (ref 130–400)
PMV BLD AUTO: 10.2 FL (ref 7.4–10.4)
POTASSIUM SERPL-SCNC: 3.3 MMOL/L (ref 3.5–5.1)
PROT SERPL-MCNC: 7.9 GM/DL (ref 5.8–7.6)
PROT UR QL STRIP.AUTO: NEGATIVE
RBC # BLD AUTO: 3.72 X10(6)/MCL (ref 4.2–5.4)
RBC #/AREA URNS AUTO: <5 /HPF
RBC UR QL AUTO: NEGATIVE
SARS-COV-2 RNA RESP QL NAA+PROBE: NOT DETECTED
SODIUM SERPL-SCNC: 136 MMOL/L (ref 136–145)
SP GR UR STRIP.AUTO: 1.01 (ref 1–1.03)
SQUAMOUS #/AREA URNS AUTO: <5 /HPF
UROBILINOGEN UR STRIP-ACNC: 0.2
WBC # SPEC AUTO: 7.81 X10(3)/MCL (ref 4.5–11.5)
WBC #/AREA URNS AUTO: <5 /HPF

## 2023-10-12 PROCEDURE — 0240U COVID/FLU A&B PCR: CPT

## 2023-10-12 PROCEDURE — 99284 EMERGENCY DEPT VISIT MOD MDM: CPT | Mod: 25

## 2023-10-12 PROCEDURE — 96361 HYDRATE IV INFUSION ADD-ON: CPT

## 2023-10-12 PROCEDURE — 80053 COMPREHEN METABOLIC PANEL: CPT

## 2023-10-12 PROCEDURE — 81001 URINALYSIS AUTO W/SCOPE: CPT | Performed by: EMERGENCY MEDICINE

## 2023-10-12 PROCEDURE — 96360 HYDRATION IV INFUSION INIT: CPT

## 2023-10-12 PROCEDURE — 85025 COMPLETE CBC W/AUTO DIFF WBC: CPT

## 2023-10-12 PROCEDURE — 63600175 PHARM REV CODE 636 W HCPCS: Performed by: EMERGENCY MEDICINE

## 2023-10-12 RX ADMIN — SODIUM CHLORIDE, POTASSIUM CHLORIDE, SODIUM LACTATE AND CALCIUM CHLORIDE 1000 ML: 600; 310; 30; 20 INJECTION, SOLUTION INTRAVENOUS at 07:10

## 2023-10-12 NOTE — ED PROVIDER NOTES
Encounter Date: 10/12/2023    SCRIBE #1 NOTE: I, Baljit Carrasquillo, am scribing for, and in the presence of,  Javon Myers MD. I have scribed the following portions of the note - Other sections scribed: HPI, ROS, PE, MDM.       History     Chief Complaint   Patient presents with    Chills    Abscess     Pt reports cyst removed from top of her head on Saturday at dermatologist office. C/o chills, subjective fever and drainage from site that started Monday. Started Doxycyline and Bactrim Monday afternoon.      A 67 y/o female who is status post scalp cyst removal with a history of hypertension, hyperlipidemia, and diabetes presents to [facility] ED with minimal scalp wound drainage and fever for the past 5 days. Patient states that she has associated symptoms of chills, weakness, nausea, decreased appetite, and polyuria. Patient is on doxycycline and bactrim at this time.     The history is provided by the patient and medical records.     Review of patient's allergies indicates:   Allergen Reactions    Acetaminophen-codeine     Cyclobenzaprine     Hydrocodone-acetaminophen     Tramadol     Versed [midazolam] Nausea Only     Past Medical History:   Diagnosis Date    Diabetes mellitus, type 2     Hyperlipidemia     Hypertension     Personal history of colonic polyps 02/16/2022     Past Surgical History:   Procedure Laterality Date    CHOLECYSTECTOMY      COLONOSCOPY W/ POLYPECTOMY  02/16/2022    CYST REMOVAL  10/07/2023    scalp    HYSTERECTOMY      MYOMECTOMY       History reviewed. No pertinent family history.  Social History     Tobacco Use    Smoking status: Never    Smokeless tobacco: Never   Substance Use Topics    Alcohol use: Not Currently    Drug use: Never     Review of Systems   Constitutional:  Positive for appetite change, chills and fever.   Gastrointestinal:  Positive for nausea.   Endocrine: Positive for polyuria.   Neurological:  Positive for weakness.       Physical Exam     Initial Vitals  [10/12/23 1512]   BP Pulse Resp Temp SpO2   (!) 155/74 73 20 98.4 °F (36.9 °C) 100 %      MAP       --         Physical Exam    Nursing note and vitals reviewed.  Constitutional: She appears well-developed. No distress.   HENT:   Head: Normocephalic.   Mouth/Throat: Oropharynx is clear and moist.   Scab with scant amount of purulent drainage.    Eyes: Conjunctivae and EOM are normal. Pupils are equal, round, and reactive to light.   Neck: Neck supple.   Cardiovascular:  Normal rate and regular rhythm.           No murmur heard.  Pulmonary/Chest: Breath sounds normal. No respiratory distress. She exhibits no tenderness.   Abdominal: Abdomen is soft. Bowel sounds are normal. She exhibits no distension. There is no abdominal tenderness.   Musculoskeletal:         General: Normal range of motion.      Cervical back: Neck supple.      Lumbar back: Normal. Normal range of motion.     Neurological: She is alert and oriented to person, place, and time. She has normal strength. No cranial nerve deficit or sensory deficit.   Psychiatric: She has a normal mood and affect. Judgment normal.         ED Course   Procedures  Labs Reviewed   COMPREHENSIVE METABOLIC PANEL - Abnormal; Notable for the following components:       Result Value    Potassium Level 3.3 (*)     Glucose Level 306 (*)     Creatinine 1.25 (*)     Protein Total 7.9 (*)     Globulin 4.3 (*)     Albumin/Globulin Ratio 0.8 (*)     All other components within normal limits   CBC WITH DIFFERENTIAL - Abnormal; Notable for the following components:    RBC 3.72 (*)     Hgb 11.2 (*)     Hct 33.1 (*)     All other components within normal limits   URINALYSIS, REFLEX TO URINE CULTURE - Abnormal; Notable for the following components:    Glucose, UA 3+ (*)     All other components within normal limits   COVID/FLU A&B PCR - Normal    Narrative:     The Xpert Xpress SARS-CoV-2/FLU/RSV plus is a rapid, multiplexed real-time PCR test intended for the simultaneous qualitative  detection and differentiation of SARS-CoV-2, Influenza A, Influenza B, and respiratory syncytial virus (RSV) viral RNA in either nasopharyngeal swab or nasal swab specimens.         URINALYSIS, MICROSCOPIC - Normal   CBC W/ AUTO DIFFERENTIAL    Narrative:     The following orders were created for panel order CBC auto differential.  Procedure                               Abnormality         Status                     ---------                               -----------         ------                     CBC with Differential[[id number]]       Abnormal            Final result                 Please view results for these tests on the individual orders.          Imaging Results              X-Ray Chest 1 View (Final result)  Result time 10/12/23 19:37:06      Final result by Dio Mejia MD (10/12/23 19:37:06)                   Impression:      NO ACUTE CARDIOPULMONARY PROCESS IDENTIFIED.      Electronically signed by: Dio Mejia  Date:    10/12/2023  Time:    19:37               Narrative:    EXAMINATION:  XR CHEST 1 VIEW    CLINICAL HISTORY:  fever;    TECHNIQUE:  One    COMPARISON:  February 11, 2018..    FINDINGS:  Suboptimal radiograph obtained with some rotation.  Cardiopericardial silhouette is within normal limits.  Right infrahilar appearance is about similar.  No definite dense focal or segmental consolidation, congestive process, pleural effusions or pneumothorax.                                       Medications   lactated ringers bolus 1,000 mL (0 mLs Intravenous Stopped 10/12/23 2220)   lactated ringers bolus 1,000 mL (0 mLs Intravenous Stopped 10/12/23 2220)     Medical Decision Making  Differential diagnosis includes but is not limited to soft tissue infection, COVID, flu, and pneumonia.     Amount and/or Complexity of Data Reviewed  Radiology: ordered.            Scribe Attestation:   Scribe #1: I performed the above scribed service and the documentation accurately describes the services I  performed. I attest to the accuracy of the note.    Attending Attestation:           Physician Attestation for Scribe:  Physician Attestation Statement for Scribe #1: I, Javon Myers MD, reviewed documentation, as scribed by Baljit Carrasquillo in my presence, and it is both accurate and complete.             ED Course as of 10/25/23 0729   Thu Oct 12, 2023   1952 Dr heller called pt encouraged her to continue abx, did call in meds for nausa, will dc after iVF [NL]      ED Course User Index  [NL] Javon Myers MD                    Clinical Impression:   Final diagnoses:  [R73.9] Hyperglycemia (Primary)  [S00.02XS, L08.9] Blister of scalp with infection, sequela        ED Disposition Condition    Discharge Stable          ED Prescriptions    None       Follow-up Information       Follow up With Specialties Details Why Contact Info    PCP  Call in 1 day  follow up with PCP ni 1-2 days             Javon Myers MD  10/25/23 0729

## 2023-10-12 NOTE — FIRST PROVIDER EVALUATION
"Medical screening examination initiated.  I have conducted a focused provider triage encounter, findings are as follows:    Brief history of present illness:  arrived to ED due to fever, chills, and drainage from head. Symptoms began several days ago. Recently had cyst removed from her head. Currently on Bactrim and Doxycyline.     Vitals:    10/12/23 1512   BP: (!) 155/74   Pulse: 73   Resp: 20   Temp: 98.4 °F (36.9 °C)   SpO2: 100%   Weight: 68 kg (150 lb)   Height: 5' 3" (1.6 m)       Pertinent physical exam:  ambulatory into triage, awake, alert, afebrile.     Brief workup plan:  labs    Preliminary workup initiated; this workup will be continued and followed by the physician or advanced practice provider that is assigned to the patient when roomed.  "

## 2023-10-13 NOTE — ED NOTES
Patient amb back to stretcher. Provided w/ warm blanket. RR nonlabored, NAD. Call light in reach. Spouse at bedside. No additional needs voiced.

## 2023-10-13 NOTE — ED NOTES
Patient resting on stretcher w/ NAD. Spouse at bedside. Denies pain. States she has had a fever et chills for the past 2 previous days, but no fever yesterday or today. States temp was reading 101 at home. States she went to urgent care et was told she has an infection from her abscess procedure last week. States she had an abscess drained last week on her scalp. Denies fever today, but states she feels bad. RR nonlabored.

## 2023-11-10 ENCOUNTER — HOSPITAL ENCOUNTER (OUTPATIENT)
Facility: HOSPITAL | Age: 68
Discharge: HOME | End: 2023-11-10
Attending: INTERNAL MEDICINE | Admitting: INTERNAL MEDICINE
Payer: MEDICARE

## 2023-11-10 VITALS
OXYGEN SATURATION: 100 % | BODY MASS INDEX: 26.45 KG/M2 | TEMPERATURE: 98 F | SYSTOLIC BLOOD PRESSURE: 124 MMHG | DIASTOLIC BLOOD PRESSURE: 63 MMHG | HEIGHT: 63 IN | WEIGHT: 149.25 LBS | HEART RATE: 63 BPM

## 2023-11-10 DIAGNOSIS — I10 HYPERTENSION: ICD-10-CM

## 2023-11-10 DIAGNOSIS — R94.8 ABNORMAL PET SCAN, MEDIASTINUM: ICD-10-CM

## 2023-11-10 LAB
CATH EF QUANTITATIVE: 70 %
POCT GLUCOSE: 108 MG/DL (ref 70–110)

## 2023-11-10 PROCEDURE — 25500020 PHARM REV CODE 255: Performed by: INTERNAL MEDICINE

## 2023-11-10 PROCEDURE — C1894 INTRO/SHEATH, NON-LASER: HCPCS | Performed by: INTERNAL MEDICINE

## 2023-11-10 PROCEDURE — 25000003 PHARM REV CODE 250: Performed by: INTERNAL MEDICINE

## 2023-11-10 PROCEDURE — 93005 ELECTROCARDIOGRAM TRACING: CPT

## 2023-11-10 PROCEDURE — 93458 L HRT ARTERY/VENTRICLE ANGIO: CPT | Performed by: INTERNAL MEDICINE

## 2023-11-10 PROCEDURE — 63600175 PHARM REV CODE 636 W HCPCS: Performed by: INTERNAL MEDICINE

## 2023-11-10 PROCEDURE — 99152 MOD SED SAME PHYS/QHP 5/>YRS: CPT | Performed by: INTERNAL MEDICINE

## 2023-11-10 PROCEDURE — C1769 GUIDE WIRE: HCPCS | Performed by: INTERNAL MEDICINE

## 2023-11-10 RX ORDER — VERAPAMIL HYDROCHLORIDE 2.5 MG/ML
INJECTION, SOLUTION INTRAVENOUS
Status: DISCONTINUED | OUTPATIENT
Start: 2023-11-10 | End: 2023-11-10 | Stop reason: HOSPADM

## 2023-11-10 RX ORDER — HEPARIN SODIUM 1000 [USP'U]/ML
INJECTION, SOLUTION INTRAVENOUS; SUBCUTANEOUS
Status: DISCONTINUED | OUTPATIENT
Start: 2023-11-10 | End: 2023-11-10 | Stop reason: HOSPADM

## 2023-11-10 RX ORDER — HYDRALAZINE HYDROCHLORIDE 20 MG/ML
10 INJECTION INTRAMUSCULAR; INTRAVENOUS EVERY 4 HOURS PRN
Status: DISCONTINUED | OUTPATIENT
Start: 2023-11-10 | End: 2023-11-10 | Stop reason: HOSPADM

## 2023-11-10 RX ORDER — ONDANSETRON 2 MG/ML
INJECTION INTRAMUSCULAR; INTRAVENOUS
Status: DISCONTINUED | OUTPATIENT
Start: 2023-11-10 | End: 2023-11-10 | Stop reason: HOSPADM

## 2023-11-10 RX ORDER — PIOGLITAZONEHYDROCHLORIDE 15 MG/1
15 TABLET ORAL DAILY
COMMUNITY

## 2023-11-10 RX ORDER — NITROGLYCERIN 20 MG/100ML
INJECTION INTRAVENOUS
Status: DISCONTINUED | OUTPATIENT
Start: 2023-11-10 | End: 2023-11-10 | Stop reason: HOSPADM

## 2023-11-10 RX ORDER — MIDAZOLAM HYDROCHLORIDE 1 MG/ML
INJECTION INTRAMUSCULAR; INTRAVENOUS
Status: DISCONTINUED | OUTPATIENT
Start: 2023-11-10 | End: 2023-11-10 | Stop reason: HOSPADM

## 2023-11-10 RX ORDER — DIPHENHYDRAMINE HCL 25 MG
50 CAPSULE ORAL
Status: DISPENSED | OUTPATIENT
Start: 2023-11-10

## 2023-11-10 RX ORDER — SODIUM CHLORIDE 9 MG/ML
INJECTION, SOLUTION INTRAVENOUS ONCE
Status: COMPLETED | OUTPATIENT
Start: 2023-11-10 | End: 2023-11-10

## 2023-11-10 RX ORDER — LIDOCAINE HYDROCHLORIDE 10 MG/ML
INJECTION INFILTRATION; PERINEURAL
Status: DISCONTINUED | OUTPATIENT
Start: 2023-11-10 | End: 2023-11-10 | Stop reason: HOSPADM

## 2023-11-10 RX ORDER — FENTANYL CITRATE 50 UG/ML
INJECTION, SOLUTION INTRAMUSCULAR; INTRAVENOUS
Status: DISCONTINUED | OUTPATIENT
Start: 2023-11-10 | End: 2023-11-10 | Stop reason: HOSPADM

## 2023-11-10 RX ORDER — SODIUM CHLORIDE 9 MG/ML
INJECTION, SOLUTION INTRAVENOUS CONTINUOUS
Status: DISCONTINUED | OUTPATIENT
Start: 2023-11-10 | End: 2023-11-10 | Stop reason: HOSPADM

## 2023-11-10 RX ORDER — ONDANSETRON 4 MG/1
8 TABLET, ORALLY DISINTEGRATING ORAL EVERY 8 HOURS PRN
Status: DISCONTINUED | OUTPATIENT
Start: 2023-11-10 | End: 2023-11-10 | Stop reason: HOSPADM

## 2023-11-10 RX ADMIN — DIPHENHYDRAMINE HYDROCHLORIDE 50 MG: 25 CAPSULE ORAL at 08:11

## 2023-11-10 RX ADMIN — SODIUM CHLORIDE: 9 INJECTION, SOLUTION INTRAVENOUS at 07:11

## 2023-11-10 NOTE — Clinical Note
The radial band was applied to the right radial artery. 12 cc's of air were inserted into the closure device. Armboard applied

## 2023-11-10 NOTE — DISCHARGE SUMMARY
[facility] - Cath Lab Services  Discharge Note  Short Stay    Procedure(s) (LRB):  Left heart cath (Left)      OUTCOME: Patient tolerated treatment/procedure well without complication and is now ready for discharge.    DISPOSITION: Home or Self Care    FINAL DIAGNOSIS:  abnormal nuclear stress test    FOLLOWUP: In clinic    DISCHARGE INSTRUCTIONS:  No discharge procedures on file.      Clinical Reference Documents Added to Patient Instructions         Document    CARDIAC CATHETERIZATION (ENGLISH)            TIME SPENT ON DISCHARGE: 35 minutes

## 2023-11-10 NOTE — H&P
Patient name: Angelia Dooley  MRN: [id number]  : 1955  Cath Lab Procedure H&P Update    Pre-Procedure Assessment:    I saw and examined the patient face to face. The patient has been re-evaluated and her condition is unchanged. The reason for admission, procedure and care is still present.  Based on the patients H&P, pre-procedure physical exam, relevant diagnostic studies, NPO status and information obtained from the patient, I determine the patient is an appropriate candidate for the proposed procedure and anesthesia planned. I further certify the anesthesia risks, benefits and options have been explained to the patient to which she agrees as documented on the procedural consent.    See scanned updated H&P and additional records from media tab.      Nickolas Cooper

## 2023-11-10 NOTE — Clinical Note
Phone report was given to Davin MEADOWS CVSS [NL] : left tympanic membrane clear, right tympanic membrane clear [Clear Rhinorrhea] : clear rhinorrhea [Erythematous Oropharynx] : nonerythematous oropharynx [Clear to Auscultation Bilaterally] : clear to auscultation bilaterally [Wheezing] : no wheezing [Rhonchi] : no rhonchi [Regular Rate and Rhythm] : regular rate and rhythm [Murmur] : no murmur

## 2023-11-13 RX ORDER — ALPRAZOLAM 0.25 MG/1
0.25 TABLET ORAL 3 TIMES DAILY
Qty: 90 TABLET | Refills: 0 | Status: CANCELLED | OUTPATIENT
Start: 2023-11-13

## 2023-11-13 NOTE — TELEPHONE ENCOUNTER
----- Message from Jes Cohen sent at 11/13/2023  9:50 AM CST -----  Regarding: med refill  .Type:  RX Refill Request    Who Called: pt  Refill or New Rx:refill  RX Name and Strength:ALPRAZolam (XANAX) 0.25 MG tablet  How is the patient currently taking it? (ex. 1XDay):3xday  Is this a 30 day or 90 day RX:90  Preferred Pharmacy with phone number:[facility]/pharmacy #[id number]  [place]  [address] [facility] [address]   Phone: [phone]  Fax:[phone]  Local or Mail Order:local  Ordering Provider:Vincent  Would the patient rather a call back or a response via [facility]? Call back   Best Call Back Number:[phone]  Additional Information: pt needs authorization from  to refill prescription

## 2023-11-13 NOTE — TELEPHONE ENCOUNTER
----- Message from Franco Christian sent at 11/13/2023  1:00 PM CST -----  .Type:  Needs Medical Advice    Who Called: pt  Symptoms (please be specific):    How long has patient had these symptoms:    Pharmacy name and phone #:    Would the patient rather a call back or a response via [facility]? Call back  Best Call Back Number: [phone]  Additional Information: pt called to cancel request for refill of xanax

## 2023-11-27 ENCOUNTER — OFFICE VISIT (OUTPATIENT)
Dept: URGENT CARE | Facility: CLINIC | Age: 68
End: 2023-11-27
Payer: MEDICARE

## 2023-11-27 VITALS
SYSTOLIC BLOOD PRESSURE: 161 MMHG | WEIGHT: 154.75 LBS | TEMPERATURE: 99 F | DIASTOLIC BLOOD PRESSURE: 81 MMHG | BODY MASS INDEX: 26.42 KG/M2 | RESPIRATION RATE: 20 BRPM | HEIGHT: 64 IN | OXYGEN SATURATION: 98 % | HEART RATE: 71 BPM

## 2023-11-27 DIAGNOSIS — J06.9 VIRAL URI: Primary | ICD-10-CM

## 2023-11-27 DIAGNOSIS — R05.9 COUGH, UNSPECIFIED TYPE: ICD-10-CM

## 2023-11-27 DIAGNOSIS — E11.9 TYPE 2 DIABETES MELLITUS WITHOUT COMPLICATION, WITHOUT LONG-TERM CURRENT USE OF INSULIN: ICD-10-CM

## 2023-11-27 LAB
CTP QC/QA: YES
CTP QC/QA: YES
FLUAV AG UPPER RESP QL IA.RAPID: NOT DETECTED
FLUBV AG UPPER RESP QL IA.RAPID: NOT DETECTED
GLUCOSE SERPL-MCNC: 122 MG/DL (ref 70–110)
POC MOLECULAR INFLUENZA A AGN: NEGATIVE
POC MOLECULAR INFLUENZA B AGN: NEGATIVE
RSV A 5' UTR RNA NPH QL NAA+PROBE: NOT DETECTED
SARS-COV-2 RDRP RESP QL NAA+PROBE: NEGATIVE
SARS-COV-2 RNA RESP QL NAA+PROBE: NOT DETECTED

## 2023-11-27 PROCEDURE — 87502 INFLUENZA DNA AMP PROBE: CPT | Mod: PBBFAC | Performed by: FAMILY MEDICINE

## 2023-11-27 PROCEDURE — 99213 OFFICE O/P EST LOW 20 MIN: CPT | Mod: S$PBB,,, | Performed by: FAMILY MEDICINE

## 2023-11-27 PROCEDURE — 0241U COVID/RSV/FLU A&B PCR: CPT | Performed by: FAMILY MEDICINE

## 2023-11-27 PROCEDURE — 99215 OFFICE O/P EST HI 40 MIN: CPT | Mod: PBBFAC | Performed by: FAMILY MEDICINE

## 2023-11-27 PROCEDURE — 82962 GLUCOSE BLOOD TEST: CPT | Mod: PBBFAC | Performed by: FAMILY MEDICINE

## 2023-11-27 PROCEDURE — 87635 SARS-COV-2 COVID-19 AMP PRB: CPT | Mod: PBBFAC | Performed by: FAMILY MEDICINE

## 2023-11-27 PROCEDURE — 99213 PR OFFICE/OUTPT VISIT, EST, LEVL III, 20-29 MIN: ICD-10-PCS | Mod: S$PBB,,, | Performed by: FAMILY MEDICINE

## 2023-11-27 PROCEDURE — 63600175 PHARM REV CODE 636 W HCPCS: Performed by: FAMILY MEDICINE

## 2023-11-27 RX ORDER — CONJUGATED ESTROGENS 0.62 MG/G
1 CREAM VAGINAL
COMMUNITY
Start: 2023-11-17

## 2023-11-27 RX ORDER — DEXAMETHASONE SODIUM PHOSPHATE 100 MG/10ML
4 INJECTION INTRAMUSCULAR; INTRAVENOUS
Status: COMPLETED | OUTPATIENT
Start: 2023-11-27 | End: 2023-11-27

## 2023-11-27 RX ORDER — ALBUTEROL SULFATE 90 UG/1
2 AEROSOL, METERED RESPIRATORY (INHALATION) EVERY 6 HOURS PRN
Qty: 1 G | Refills: 3 | Status: SHIPPED | OUTPATIENT
Start: 2023-11-27 | End: 2023-12-05

## 2023-11-27 RX ORDER — BENZONATATE 200 MG/1
200 CAPSULE ORAL 3 TIMES DAILY PRN
Qty: 30 CAPSULE | Refills: 1 | Status: SHIPPED | OUTPATIENT
Start: 2023-11-27 | End: 2023-12-05

## 2023-11-27 RX ADMIN — DEXAMETHASONE SODIUM PHOSPHATE 4 MG: 10 INJECTION INTRAMUSCULAR; INTRAVENOUS at 10:11

## 2023-11-27 NOTE — PROGRESS NOTES
"Subjective:       Patient ID: Angelia Dooley is a 68 y.o. female.    Vitals:  height is 5' 4" (1.626 m) and weight is 70.2 kg (154 lb 12.2 oz). Her temperature is 98.6 °F (37 °C). Her blood pressure is 161/81 (abnormal) and her pulse is 71. Her respiration is 20 and oxygen saturation is 98%.     Chief Complaint: Cough and Sinus Problem (X 5 days)    Patient with 1 week of clear rhinorrhea, sneezing, cough with occasional wheezing.  States she took full course of Zithromax prescribed by her PCP with no significant change.  Requesting a steroid shot.  Denies fever or chills.  No vomiting or diarrhea.  States history of diabetes but glucose has been controlled.  Has tolerated glucocorticoids in the past.        Objective:   Physical Exam   Constitutional: She appears well-developed.  Non-toxic appearance. She does not appear ill. No distress.   HENT:   Head: Atraumatic.   Nose: No purulent discharge. Right sinus exhibits no maxillary sinus tenderness and no frontal sinus tenderness. Left sinus exhibits no maxillary sinus tenderness and no frontal sinus tenderness.   Mouth/Throat: Uvula is midline. No oropharyngeal exudate or posterior oropharyngeal erythema.   Eyes: Right eye exhibits no discharge. Left eye exhibits no discharge. Extraocular movement intact   Neck: Neck supple. No neck rigidity present.   Cardiovascular: Regular rhythm.   Pulmonary/Chest: Effort normal. No respiratory distress. She has wheezes (Faint end expiratory wheezes left posterior). She has no rales.   Lymphadenopathy:     She has no cervical adenopathy.   Neurological: She is alert.   Skin: Skin is warm, dry and not diaphoretic.   Psychiatric: Her behavior is normal.   Nursing note and vitals reviewed.      Results for orders placed or performed in visit on 11/27/23   POCT COVID-19 Rapid Screening   Result Value Ref Range    POC Rapid COVID Negative Negative     Acceptable Yes    POCT Influenza A/B Molecular   Result " Value Ref Range    POC Molecular Influenza A Ag Negative Negative, Not Reported    POC Molecular Influenza B Ag Negative Negative, Not Reported     Acceptable Yes    POCT Glucose, Hand-Held Device   Result Value Ref Range    POC Glucose 122 (A) 70 - 110 MG/DL       Assessment:     1. Viral URI    2. Cough, unspecified type    3. Type 2 diabetes mellitus without complication, without long-term current use of insulin          Plan:   Agreed to give Decadron IM.  Cautioned about glucose control, she will monitor closely.  Prescription for Tessalon Perles, albuterol.    Viral URI  -     dexAMETHasone injection 4 mg  -     albuterol (PROVENTIL HFA) 90 mcg/actuation inhaler; Inhale 2 puffs into the lungs every 6 (six) hours as needed for Wheezing. Rescue  Dispense: 1 g; Refill: 3  -     benzonatate (TESSALON) 200 MG capsule; Take 1 capsule (200 mg total) by mouth 3 (three) times daily as needed for Cough.  Dispense: 30 capsule; Refill: 1    Cough, unspecified type  -     POCT COVID-19 Rapid Screening  -     POCT Influenza A/B Molecular  -     COVID/RSV/FLU A&B PCR    Type 2 diabetes mellitus without complication, without long-term current use of insulin  -     POCT Glucose, Hand-Held Device        Please note: This chart was completed via voice to text dictation. It may contain typographical/word recognition errors. If there are any questions, please contact the provider for final clarification.

## 2023-11-30 ENCOUNTER — TELEPHONE (OUTPATIENT)
Dept: URGENT CARE | Facility: CLINIC | Age: 68
End: 2023-11-30
Payer: COMMERCIAL

## 2023-11-30 LAB — BCS RECOMMENDATION EXT: NORMAL

## 2023-11-30 NOTE — TELEPHONE ENCOUNTER
----- Message from Richie Islas MD sent at 11/27/2023  2:40 PM CST -----  Please notify with results

## 2023-12-01 ENCOUNTER — PATIENT OUTREACH (OUTPATIENT)
Dept: ADMINISTRATIVE | Facility: HOSPITAL | Age: 68
End: 2023-12-01
Payer: COMMERCIAL

## 2023-12-01 NOTE — LETTER
"  This communication is flagged as high priority.        AUTHORIZATION FOR RELEASE OF   CONFIDENTIAL INFORMATION    Dear Staff Tanya Munoz,    We are seeing Angelia Dooley, date of birth 1955, in the clinic at [facility]. Apolonia Aguilar MD is the patient's PCP. Angelia Dooley has an outstanding lab/procedure at the time we reviewed her chart. In order to help keep her health information updated, she has authorized us to request the following medical record(s):                                     (xx  )  MAMMOGRAM    if after 2022                                             Please fax records to [facility], Bienvenu-Oubre, Shauna, MD,  [phone]  Attn: Vidya      If you have any questions, please contact Sugey Mcelroy" [facility] Coordinator @ [phone]    Patient Name: Angelia Dooley  : 1955  Patient Phone #: [phone]     "

## 2023-12-01 NOTE — LETTER
"  This communication is flagged as high priority.        AUTHORIZATION FOR RELEASE OF   CONFIDENTIAL INFORMATION    Dear Staff Raza Banerjee,    We are seeing Angelia Dooley, date of birth 1955, in the clinic at [facility]. Apolonia Aguilar MD is the patient's PCP. Angelia Dooley has an outstanding lab/procedure at the time we reviewed her chart. In order to help keep her health information updated, she has authorized us to request the following medical record(s):        (  )  MAMMOGRAM                                      (  )  COLONOSCOPY      (  )  PAP SMEAR                                          (  )  OUTSIDE LAB RESULTS     (  )  DEXA SCAN                                          (  xx)  DM EYE EXAM            (  )  FOOT EXAM                                          (  )  ENTIRE RECORD     (  )  OUTSIDE IMMUNIZATIONS                 (  )  _______________         Please fax records to [facility], Bienvenu-Oubre, Shauna, [place],  [phone]  Attn: Vidya      If you have any questions, please contact Sugey Mcelroy" [facility] Coordinator @ [phone]    Patient Name: Angelia Dooley  : 1955  Patient Phone #: [phone]     "

## 2023-12-05 ENCOUNTER — OFFICE VISIT (OUTPATIENT)
Dept: FAMILY MEDICINE | Facility: CLINIC | Age: 68
End: 2023-12-05
Payer: MEDICARE

## 2023-12-05 ENCOUNTER — TELEPHONE (OUTPATIENT)
Dept: FAMILY MEDICINE | Facility: CLINIC | Age: 68
End: 2023-12-05

## 2023-12-05 VITALS
RESPIRATION RATE: 18 BRPM | BODY MASS INDEX: 27.05 KG/M2 | DIASTOLIC BLOOD PRESSURE: 82 MMHG | TEMPERATURE: 98 F | OXYGEN SATURATION: 97 % | SYSTOLIC BLOOD PRESSURE: 133 MMHG | HEART RATE: 69 BPM | WEIGHT: 152.69 LBS | HEIGHT: 63 IN

## 2023-12-05 DIAGNOSIS — F41.1 GAD (GENERALIZED ANXIETY DISORDER): ICD-10-CM

## 2023-12-05 DIAGNOSIS — E11.65 TYPE 2 DIABETES MELLITUS WITH HYPERGLYCEMIA, WITH LONG-TERM CURRENT USE OF INSULIN: ICD-10-CM

## 2023-12-05 DIAGNOSIS — I10 PRIMARY HYPERTENSION: Chronic | ICD-10-CM

## 2023-12-05 DIAGNOSIS — Z11.59 NEED FOR HEPATITIS C SCREENING TEST: ICD-10-CM

## 2023-12-05 DIAGNOSIS — F51.01 PRIMARY INSOMNIA: Chronic | ICD-10-CM

## 2023-12-05 DIAGNOSIS — R35.0 URINARY FREQUENCY: Primary | ICD-10-CM

## 2023-12-05 DIAGNOSIS — Z79.899 LONG-TERM CURRENT USE OF BENZODIAZEPINE: ICD-10-CM

## 2023-12-05 DIAGNOSIS — Z79.4 TYPE 2 DIABETES MELLITUS WITH HYPERGLYCEMIA, WITH LONG-TERM CURRENT USE OF INSULIN: ICD-10-CM

## 2023-12-05 PROBLEM — G47.00 INSOMNIA: Chronic | Status: ACTIVE | Noted: 2023-09-13

## 2023-12-05 PROBLEM — Z00.00 MEDICARE ANNUAL WELLNESS VISIT, SUBSEQUENT: Status: RESOLVED | Noted: 2023-09-13 | Resolved: 2023-12-05

## 2023-12-05 PROBLEM — E78.2 MIXED HYPERLIPIDEMIA: Status: ACTIVE | Noted: 2023-12-05

## 2023-12-05 LAB
APPEARANCE UR: CLEAR
BACTERIA #/AREA URNS AUTO: ABNORMAL /HPF
BILIRUB UR QL STRIP.AUTO: NEGATIVE
COLOR UR AUTO: YELLOW
GLUCOSE UR QL STRIP.AUTO: NORMAL
HYALINE CASTS #/AREA URNS LPF: ABNORMAL /LPF
KETONES UR QL STRIP.AUTO: NEGATIVE
LEUKOCYTE ESTERASE UR QL STRIP.AUTO: NEGATIVE
MUCOUS THREADS URNS QL MICRO: ABNORMAL /LPF
NITRITE UR QL STRIP.AUTO: NEGATIVE
PH UR STRIP.AUTO: 5.5 [PH]
PROT UR QL STRIP.AUTO: NEGATIVE
RBC #/AREA URNS AUTO: ABNORMAL /HPF
RBC UR QL AUTO: NEGATIVE
SP GR UR STRIP.AUTO: 1.02 (ref 1–1.03)
SQUAMOUS #/AREA URNS LPF: ABNORMAL /HPF
UROBILINOGEN UR STRIP-ACNC: NORMAL
WBC #/AREA URNS AUTO: ABNORMAL /HPF

## 2023-12-05 PROCEDURE — 81001 URINALYSIS AUTO W/SCOPE: CPT | Performed by: FAMILY MEDICINE

## 2023-12-05 PROCEDURE — 99214 PR OFFICE/OUTPT VISIT, EST, LEVL IV, 30-39 MIN: ICD-10-PCS | Mod: ,,, | Performed by: FAMILY MEDICINE

## 2023-12-05 PROCEDURE — 99214 OFFICE O/P EST MOD 30 MIN: CPT | Mod: ,,, | Performed by: FAMILY MEDICINE

## 2023-12-05 RX ORDER — DOXEPIN 6 MG/1
1 TABLET, FILM COATED ORAL NIGHTLY
Qty: 30 TABLET | Refills: 0 | Status: SHIPPED | OUTPATIENT
Start: 2023-12-05 | End: 2024-01-10

## 2023-12-05 RX ORDER — ALPRAZOLAM 0.25 MG/1
0.25 TABLET ORAL 3 TIMES DAILY
Qty: 90 TABLET | Refills: 0 | Status: SHIPPED | OUTPATIENT
Start: 2023-12-05

## 2023-12-05 NOTE — PROGRESS NOTES
Angelia Dooley  12/05/2023  [id number]    Apolonia Aguilar MD  Patient Care Team:  Apolonia Aguilar MD as PCP - General (Family Medicine)  Pablito Thapa MD as Consulting Physician (Obstetrics and Gynecology)  Nickolas Cooper MD as Consulting Physician (Cardiology)      Chief Complaint:  Chief Complaint   Patient presents with    Establish Care     Needs PCP-Previously seen by Dr Serrato. Pt having increased stress and insomnia due to caring for her mother  Pt c/o urinary frequency       History of Present Illness:    68 y.o. female who presents today to establish care. She has HTN, HLD, DMII and sees Dr. Cooper for abnormal nuclear stress test. She c/o increased stress, anxiety and insomnia since having to take care of her aging mother. No depression. Also c/o urinary frequency, but denies dysuria, suprapubic pain/pressure, hematuria.     Review of Systems  General: denies f/c, weight loss, night sweats, decreased appetite  Eye: denies blurred vision, changes in vision  Respiratory: denies sob, wheezing, cough  Cardiovascular: denies chest pain, palpitations, edema  Gastrointestinal: denies abdominal pain, n/v, constipation, diarrhea  Integumentary: denies rashes, pruritis    Past Medical History  Past Medical History:   Diagnosis Date    Diabetes mellitus, type 2     Hyperlipidemia     Hypertension     Personal history of colonic polyps 02/16/2022       Medications  Medication List with Changes/Refills   New Medications    DOXEPIN (SILENOR) 6 MG TAB    Take 1 tablet by mouth every evening.   Current Medications    BISOPROLOL (ZEBETA) 5 MG TABLET    See Instructions, TAKE 1 TABLET BY MOUTH EVERY DAY, # 90 tab(s), 3 Refill(s), Pharmacy: [facility]/pharmacy #[id number], 163, cm, Height/Length Dosing, 03/31/22 10:47:00 CDT, 69, kg, Weight Dosing, 03/31/22 10:47:00 CDT    CONTOUR NEXT ONE METER MISC    USE TO TEST BLOOD GLUCOSE TWICE DAILY    GLIMEPIRIDE (AMARYL) 4 MG TABLET    Take 4 mg by mouth 2  (two) times daily.    JANUVIA 100 MG TAB    Take 100 mg by mouth every evening.    PIOGLITAZONE (ACTOS) 15 MG TABLET    Take 15 mg by mouth once daily.    PREMARIN VAGINAL CREAM    Place 1 g vaginally every 7 days.    RAMIPRIL (ALTACE) 10 MG CAPSULE    Take 10 mg by mouth once daily.    TRIAMTERENE-HYDROCHLOROTHIAZIDE 37.5-25 MG (DYAZIDE) 37.5-25 MG PER CAPSULE    Take 1 capsule by mouth once daily.    VITAMIN E ACETATE (VITAMIN E ORAL)    Take 400 Units by mouth once daily.   Changed and/or Refilled Medications    Modified Medication Previous Medication    ALPRAZOLAM (XANAX) 0.25 MG TABLET ALPRAZolam (XANAX) 0.25 MG tablet       Take 1 tablet (0.25 mg total) by mouth 3 (three) times daily.    Take 1 tablet (0.25 mg total) by mouth 3 (three) times daily.   Discontinued Medications    ALBUTEROL (PROVENTIL HFA) 90 MCG/ACTUATION INHALER    Inhale 2 puffs into the lungs every 6 (six) hours as needed for Wheezing. Rescue    BENZONATATE (TESSALON) 200 MG CAPSULE    Take 1 capsule (200 mg total) by mouth 3 (three) times daily as needed for Cough.    CHOLECALCIFEROL, VITAMIN D3, 1,250 MCG (50,000 UNIT) CAPSULE    Take 50,000 Units by mouth every 7 days.    KETOCONAZOLE (NIZORAL) 2 % SHAMPOO    Apply topically 3 (three) times a week.    TRAZODONE (DESYREL) 50 MG TABLET    Take 0.5 tablets (25 mg total) by mouth every evening.       Past Surgical History:   Procedure Laterality Date    CHOLECYSTECTOMY      COLONOSCOPY W/ POLYPECTOMY  02/16/2022    CYST REMOVAL  10/07/2023    scalp    HYSTERECTOMY      LEFT HEART CATHETERIZATION Left 11/10/2023    Procedure: Left heart cath;  Surgeon: Nickolas Cooper MD;  Location: [facility] CATH LAB;  Service: Cardiology;  Laterality: Left;    MYOMECTOMY         SUBJECTIVE:  Health Maintenance  Health Maintenance Topics with due status: Not Due       Topic Last Completion Date    Colorectal Cancer Screening 02/16/2022    DEXA Scan 08/04/2022    Lipid Panel 03/06/2023    Eye Exam 06/05/2023     "Hemoglobin A1c 08/07/2023     Health Maintenance Due   Topic Date Due    Hepatitis C Screening  Never done    TETANUS VACCINE  Never done    Low Dose Statin  Never done    RSV Vaccine (Age 60+ and Pregnant patients) (1 - 1-dose 60+ series) Never done    Foot Exam  07/26/2023    COVID-19 Vaccine (12 - 2023-24 season) 09/01/2023    Mammogram  11/29/2023    Diabetes Urine Screening  10/11/2023       Exam:  Vitals:    12/05/23 0845   BP: 133/82   BP Location: Left arm   Patient Position: Sitting   BP Method: Large (Automatic)   Pulse: 69   Resp: 18   Temp: 98 °F (36.7 °C)   TempSrc: Oral   SpO2: 97%   Weight: 69.3 kg (152 lb 11.2 oz)   Height: 5' 3" (1.6 m)     Weight: 69.3 kg (152 lb 11.2 oz)   Body mass index is 27.05 kg/m².      Physical Exam  Constitutional: NAD, alert, pleasant  Respiratory: CTAB, no wheezes, rales or rhonchi. No accessory muscle use  Eyes: EOMI  Cardiovascular: RRR, systolic murmur. No JVD. No LE edema  Integumentary: warm, dry, intact  Psych: AA&Ox3      ICD-10-CM ICD-9-CM   1. Urinary frequency  R35.0 788.41   2. Long-term current use of benzodiazepine  Z79.899 V58.69   3. Type 2 diabetes mellitus with hyperglycemia, with long-term current use of insulin  E11.65 250.00    Z79.4 790.29     V58.67   4. Primary insomnia  F51.01 307.42   5. FRANKY (generalized anxiety disorder)  F41.1 300.02   6. Primary hypertension  I10 401.9       1. Urinary frequency  Comments:  UA with culture today  Orders:  -     Urinalysis, Reflex to Urine Culture; Future; Expected date: 12/05/2023    2. Long-term current use of benzodiazepine    3. Type 2 diabetes mellitus with hyperglycemia, with long-term current use of insulin  Overview:  A1c 7.6 in October, not at goal. Sees Dr. Catherine. Fasting sugars range low 100s to 150's. NO lows. Has not been able to adhere to a diabetic diet lately    Rtc with labs      4. Primary insomnia  Overview:  Takes xanax 0.25 mg nightly which works well. Failed trazodone. She is open to trying " doxepin.    Will start low dose doxepin and increase if needed  Sleep hygiene discussed with patient    Orders:  -     doxepin (SILENOR) 6 mg Tab; Take 1 tablet by mouth every evening.  Dispense: 30 tablet; Refill: 0    5. FRANKY (generalized anxiety disorder)  Overview:  Takes xanax prn sparingly, but has been having increased anxiety because she is caring for her ill mother. She is in need of refills.  reviewed.     Risks of xanax including falls, confusion, addiction discussed with patient.     Refill for prn usage    Orders:  -     ALPRAZolam (XANAX) 0.25 MG tablet; Take 1 tablet (0.25 mg total) by mouth 3 (three) times daily.  Dispense: 90 tablet; Refill: 0    6. Primary hypertension  Overview:  At goal on current meds. [facility] with Dr. Cooper 11/2023. Asymptomatic.     Continue current Rx meds             Follow up: Follow up for end of january for  diabetes with labs here.      Care Plan/Goals: Reviewed   Goals    None

## 2023-12-07 ENCOUNTER — PATIENT OUTREACH (OUTPATIENT)
Dept: ADMINISTRATIVE | Facility: HOSPITAL | Age: 68
End: 2023-12-07
Payer: COMMERCIAL

## 2023-12-14 ENCOUNTER — TELEPHONE (OUTPATIENT)
Dept: FAMILY MEDICINE | Facility: CLINIC | Age: 68
End: 2023-12-14
Payer: COMMERCIAL

## 2023-12-14 NOTE — TELEPHONE ENCOUNTER
Pt notified of the recommendations. Verbal understanding given. She states that she wants time to look up the Doxepin vs the Amitriptyline and will call back on tomorrow with her decision

## 2023-12-14 NOTE — TELEPHONE ENCOUNTER
Pt states that she has tried multiple pharmacies and none of the have the doxepin. She states that she does not want to go up to the 10mg because she feels as though it will be too strong. She is asking if there is something else that is very mild that she can be prescribed. Please advise.

## 2023-12-14 NOTE — TELEPHONE ENCOUNTER
The 10 mg doxepin is not a huge dose increase. I usually use 25 mg for most people. But if she refuses I can try a low dose amitryptyline. The xanax she was taking is much more dangerous than either of these meds.

## 2023-12-15 NOTE — TELEPHONE ENCOUNTER
Pt called back stating that she was able to get the Doxepin 6mg. She will go ahead and try this and let us know how it works for her.

## 2024-01-10 DIAGNOSIS — F51.01 PRIMARY INSOMNIA: Chronic | ICD-10-CM

## 2024-01-10 RX ORDER — DOXEPIN 6 MG/1
1 TABLET, FILM COATED ORAL NIGHTLY
Qty: 30 TABLET | Refills: 0 | Status: SHIPPED | OUTPATIENT
Start: 2024-01-10 | End: 2024-01-29

## 2024-01-25 PROCEDURE — 86803 HEPATITIS C AB TEST: CPT | Performed by: FAMILY MEDICINE

## 2024-01-25 PROCEDURE — 81001 URINALYSIS AUTO W/SCOPE: CPT | Performed by: FAMILY MEDICINE

## 2024-01-25 PROCEDURE — 85025 COMPLETE CBC W/AUTO DIFF WBC: CPT | Performed by: FAMILY MEDICINE

## 2024-01-25 PROCEDURE — 80061 LIPID PANEL: CPT | Performed by: FAMILY MEDICINE

## 2024-01-25 PROCEDURE — 83036 HEMOGLOBIN GLYCOSYLATED A1C: CPT | Performed by: FAMILY MEDICINE

## 2024-01-25 PROCEDURE — 80053 COMPREHEN METABOLIC PANEL: CPT | Performed by: FAMILY MEDICINE

## 2024-01-25 PROCEDURE — 84443 ASSAY THYROID STIM HORMONE: CPT | Performed by: FAMILY MEDICINE

## 2024-01-25 PROCEDURE — 82043 UR ALBUMIN QUANTITATIVE: CPT | Performed by: FAMILY MEDICINE

## 2024-01-29 ENCOUNTER — OFFICE VISIT (OUTPATIENT)
Dept: FAMILY MEDICINE | Facility: CLINIC | Age: 69
End: 2024-01-29
Payer: MEDICARE

## 2024-01-29 ENCOUNTER — TELEPHONE (OUTPATIENT)
Dept: FAMILY MEDICINE | Facility: CLINIC | Age: 69
End: 2024-01-29

## 2024-01-29 ENCOUNTER — PATIENT OUTREACH (OUTPATIENT)
Dept: ADMINISTRATIVE | Facility: HOSPITAL | Age: 69
End: 2024-01-29
Payer: MEDICARE

## 2024-01-29 VITALS
HEIGHT: 63 IN | DIASTOLIC BLOOD PRESSURE: 78 MMHG | RESPIRATION RATE: 18 BRPM | OXYGEN SATURATION: 97 % | TEMPERATURE: 98 F | HEART RATE: 71 BPM | SYSTOLIC BLOOD PRESSURE: 133 MMHG | WEIGHT: 157 LBS | BODY MASS INDEX: 27.82 KG/M2

## 2024-01-29 DIAGNOSIS — R20.2 PARESTHESIA OF LEFT LEG: ICD-10-CM

## 2024-01-29 DIAGNOSIS — D89.2 HYPERGAMMAGLOBULINEMIA: ICD-10-CM

## 2024-01-29 DIAGNOSIS — E11.65 TYPE 2 DIABETES MELLITUS WITH HYPERGLYCEMIA, WITH LONG-TERM CURRENT USE OF INSULIN: Primary | Chronic | ICD-10-CM

## 2024-01-29 DIAGNOSIS — E78.2 MIXED HYPERLIPIDEMIA: ICD-10-CM

## 2024-01-29 DIAGNOSIS — I10 PRIMARY HYPERTENSION: Chronic | ICD-10-CM

## 2024-01-29 DIAGNOSIS — F51.01 PRIMARY INSOMNIA: Chronic | ICD-10-CM

## 2024-01-29 DIAGNOSIS — Z00.00 MEDICARE ANNUAL WELLNESS VISIT, SUBSEQUENT: ICD-10-CM

## 2024-01-29 DIAGNOSIS — Z86.010 PERSONAL HISTORY OF COLONIC POLYPS: ICD-10-CM

## 2024-01-29 DIAGNOSIS — D70.8 OTHER NEUTROPENIA: ICD-10-CM

## 2024-01-29 DIAGNOSIS — Z79.4 TYPE 2 DIABETES MELLITUS WITH HYPERGLYCEMIA, WITH LONG-TERM CURRENT USE OF INSULIN: Primary | Chronic | ICD-10-CM

## 2024-01-29 PROBLEM — Z86.0100 PERSONAL HISTORY OF COLONIC POLYPS: Status: ACTIVE | Noted: 2024-01-29

## 2024-01-29 PROCEDURE — 99214 OFFICE O/P EST MOD 30 MIN: CPT | Mod: ,,, | Performed by: FAMILY MEDICINE

## 2024-01-29 RX ORDER — ATORVASTATIN CALCIUM 10 MG/1
10 TABLET, FILM COATED ORAL DAILY
Qty: 30 TABLET | Refills: 11 | Status: SHIPPED | OUTPATIENT
Start: 2024-01-29 | End: 2024-04-29

## 2024-01-29 NOTE — PROGRESS NOTES
Angelia Dooley  01/29/2024  [id number]    Apolonia Aguilar MD  Patient Care Team:  Apolonia Aguilar MD as PCP - General (Family Medicine)  Pablito Thapa MD as Consulting Physician (Obstetrics and Gynecology)  Nickolas Cooper MD as Consulting Physician (Cardiology)      Chief Complaint:  Chief Complaint   Patient presents with    Follow-up     Follow up for Diabetes with lab results       History of Present Illness:    68 y.o. female who presents today for diabetes f/u with labs. Labs reviewed by me and were discussed with patient. All questions regarding lab results were answered.     She c/o left leg pain. States when she lays on left side the left lateral leg gets numb and achy. It lasts for a few minutes and resolves when she rolls over. Does not happen any other time. NO le weakness.      Review of Systems  General: denies f/c, weight loss, night sweats, decreased appetite  Eye: denies blurred vision, changes in vision  Respiratory: denies sob, wheezing, cough  Cardiovascular: denies chest pain, palpitations, edema  Gastrointestinal: denies abdominal pain, n/v, constipation, diarrhea  Integumentary: denies rashes, pruritis    Past Medical History  Past Medical History:   Diagnosis Date    Diabetes mellitus, type 2     Hyperlipidemia     Hypertension     Personal history of colonic polyps 02/16/2022       Medications  Medication List with Changes/Refills   New Medications    ATORVASTATIN (LIPITOR) 10 MG TABLET    Take 1 tablet (10 mg total) by mouth once daily.    BLOOD SUGAR DIAGNOSTIC STRP    To check BG 1 times daily, to use with insurance preferred meter   Current Medications    ALPRAZOLAM (XANAX) 0.25 MG TABLET    Take 1 tablet (0.25 mg total) by mouth 3 (three) times daily.    BISOPROLOL (ZEBETA) 5 MG TABLET    See Instructions, TAKE 1 TABLET BY MOUTH EVERY DAY, # 90 tab(s), 3 Refill(s), Pharmacy: [facility]/pharmacy #[id number], 163, cm, Height/Length Dosing, 03/31/22 10:47:00 CDT, 69, kg,  Weight Dosing, 03/31/22 10:47:00 CDT    CONTOUR NEXT ONE METER MISC    USE TO TEST BLOOD GLUCOSE TWICE DAILY    GLIMEPIRIDE (AMARYL) 4 MG TABLET    Take 4 mg by mouth 2 (two) times daily.    JANUVIA 100 MG TAB    Take 100 mg by mouth every evening.    PIOGLITAZONE (ACTOS) 15 MG TABLET    Take 15 mg by mouth once daily.    PREMARIN VAGINAL CREAM    Place 1 g vaginally every 7 days.    RAMIPRIL (ALTACE) 10 MG CAPSULE    Take 10 mg by mouth once daily.    TRIAMTERENE-HYDROCHLOROTHIAZIDE 37.5-25 MG (DYAZIDE) 37.5-25 MG PER CAPSULE    Take 1 capsule by mouth once daily.    VITAMIN E ACETATE (VITAMIN E ORAL)    Take 400 Units by mouth once daily.   Discontinued Medications    DOXEPIN (SILENOR) 6 MG TAB    TAKE 1 TABLET BY MOUTH EVERY DAY IN THE EVENING       Past Surgical History:   Procedure Laterality Date    CHOLECYSTECTOMY      COLONOSCOPY W/ POLYPECTOMY  02/16/2022    CYST REMOVAL  10/07/2023    scalp    HYSTERECTOMY      LEFT HEART CATHETERIZATION Left 11/10/2023    Procedure: Left heart cath;  Surgeon: Nickolas Cooper MD;  Location: [facility] CATH LAB;  Service: Cardiology;  Laterality: Left;    MYOMECTOMY         SUBJECTIVE:  Health Maintenance  Health Maintenance Topics with due status: Not Due       Topic Last Completion Date    Colorectal Cancer Screening 02/16/2022    DEXA Scan 08/04/2022    Eye Exam 06/05/2023    Mammogram 12/07/2023    Diabetes Urine Screening 01/25/2024    Lipid Panel 01/25/2024    Hemoglobin A1c 01/25/2024    Foot Exam 01/29/2024     Health Maintenance Due   Topic Date Due    TETANUS VACCINE  Never done    Low Dose Statin  Never done    RSV Vaccine (Age 60+ and Pregnant patients) (1 - 1-dose 60+ series) Never done    COVID-19 Vaccine (12 - 2023-24 season) 09/01/2023       Exam:  Vitals:    01/29/24 1004   BP: 133/78   BP Location: Right arm   Patient Position: Sitting   BP Method: Large (Automatic)   Pulse: 71   Resp: 18   Temp: 97.5 °F (36.4 °C)   TempSrc: Oral   SpO2: 97%   Weight: 71.2 kg  "(157 lb)   Height: 5' 3" (1.6 m)     Weight: 71.2 kg (157 lb)   Body mass index is 27.81 kg/m².      Physical Exam  Constitutional: NAD, alert, pleasant  Respiratory: CTAB, no wheezes, rales or rhonchi. No accessory muscle use  Eyes: EOMI  Cardiovascular: RRR, No m/r/g. No JVD. No LE edema  Gastrointestinal: BS+, nontender, nondistended  Integumentary: warm, dry, intact  Psych: AA&Ox3  Protective Sensation (w/ 10 gram monofilament):  Right: Intact  Left: Intact    Visual Inspection:  Normal -  Bilateral    Pedal Pulses:   Right: Present  Left: Present    Posterior Tibialis Pulses:   Right:Present  Left: Present        ICD-10-CM ICD-9-CM   1. Type 2 diabetes mellitus with hyperglycemia, with long-term current use of insulin  E11.65 250.00    Z79.4 790.29     V58.67   2. Other neutropenia  D70.8 288.09   3. Hypergammaglobulinemia  D89.2 289.89   4. Primary hypertension  I10 401.9   5. Mixed hyperlipidemia  E78.2 272.2   6. Medicare annual wellness visit, subsequent  Z00.00 V70.0   7. Primary insomnia  F51.01 307.42   8. Personal history of colonic polyps  Z86.010 V12.72   9. Paresthesia of left leg  R20.2 782.0       1. Type 2 diabetes mellitus with hyperglycemia, with long-term current use of insulin  Overview:  A1c down from 7.6 to 7.2. Sees Dr. Catherine. Fasting sugars range low 100s to 150's. NO lows. Has not been able to adhere to a diabetic diet lately      Continue current Rx meds  Rtc 3 mts with labs      Orders:  -     Hemoglobin A1C; Future; Expected date: 04/29/2024  -     Cancel: Basic Metabolic Panel; Future; Expected date: 04/29/2024  -     CBC Auto Differential; Future; Expected date: 09/01/2024  -     Comprehensive Metabolic Panel; Future; Expected date: 09/01/2024  -     Lipid Panel; Future; Expected date: 09/01/2024  -     TSH; Future; Expected date: 09/01/2024  -     Hemoglobin A1C; Future; Expected date: 09/01/2024  -     Urinalysis; Future; Expected date: 09/01/2024  -     Microalbumin/Creatinine " Ratio, Urine; Future; Expected date: 09/01/2024  -     atorvastatin (LIPITOR) 10 MG tablet; Take 1 tablet (10 mg total) by mouth once daily.  Dispense: 30 tablet; Refill: 11  -     blood sugar diagnostic Strp; To check BG 1 times daily, to use with insurance preferred meter  Dispense: 100 strip; Refill: 3    2. Other neutropenia  Overview:  Chronic. Asymptomatic     Labs for neutropenia work up ordered    Orders:  -     Copper, Serum; Future; Expected date: 01/29/2024  -     Folate; Future; Expected date: 01/29/2024  -     Vitamin B12; Future; Expected date: 01/29/2024  -     CBC Auto Differential; Future; Expected date: 01/29/2024  -     Immunoglobulin Free LT Chains Blood; Future; Expected date: 01/29/2024  -     Protein Electrophoresis, Serum, w/Interp; Future; Expected date: 01/29/2024  -     Path Review, Peripheral Smear; Future; Expected date: 01/29/2024    3. Hypergammaglobulinemia  Overview:  Has elevated total protein and globulins    Work up ordered    Orders:  -     Copper, Serum; Future; Expected date: 01/29/2024  -     Folate; Future; Expected date: 01/29/2024  -     Vitamin B12; Future; Expected date: 01/29/2024  -     CBC Auto Differential; Future; Expected date: 01/29/2024  -     Immunoglobulin Free LT Chains Blood; Future; Expected date: 01/29/2024  -     Protein Electrophoresis, Serum, w/Interp; Future; Expected date: 01/29/2024  -     Path Review, Peripheral Smear; Future; Expected date: 01/29/2024    4. Primary hypertension  Overview:  At goal on current meds. Neg [facility] with Dr. Cooper 11/2023. Asymptomatic.     Continue current Rx meds        5. Mixed hyperlipidemia  Overview:  Ldl not at goal. Not on statin.    The 10-year ASCVD risk score (Meryl DK, et al., 2019) is: 23.8%    Values used to calculate the score:      Age: 68 years      Sex: Female      Is Non- : Yes      Diabetic: Yes      Tobacco smoker: No      Systolic Blood Pressure: 133 mmHg      Is BP treated: Yes       HDL Cholesterol: 54 mg/dL      Total Cholesterol: 192 mg/dL    Statin recommended. Risks and benefits of statin discussed with patient. LDL should be less than 100.     Patient agrees to low dose statin. Will try lipitor 10 mg daily. Repeat lipids in 3 mts      Orders:  -     CBC Auto Differential; Future; Expected date: 09/01/2024  -     Comprehensive Metabolic Panel; Future; Expected date: 09/01/2024  -     Lipid Panel; Future; Expected date: 09/01/2024  -     atorvastatin (LIPITOR) 10 MG tablet; Take 1 tablet (10 mg total) by mouth once daily.  Dispense: 30 tablet; Refill: 11  -     Lipid Panel; Future; Expected date: 04/29/2024  -     Comprehensive Metabolic Panel; Future; Expected date: 04/29/2024    6. Medicare annual wellness visit, subsequent  -     CBC Auto Differential; Future; Expected date: 09/01/2024  -     Comprehensive Metabolic Panel; Future; Expected date: 09/01/2024  -     Lipid Panel; Future; Expected date: 09/01/2024  -     TSH; Future; Expected date: 09/01/2024  -     Hemoglobin A1C; Future; Expected date: 09/01/2024  -     Urinalysis; Future; Expected date: 09/01/2024  -     Microalbumin/Creatinine Ratio, Urine; Future; Expected date: 09/01/2024    7. Primary insomnia  Overview:  Takes xanax 0.25 mg nightly which works well. Failed trazodone and doxepin. Takes xanax sparingly for prn insomnia.     Continue current Rx meds        8. Personal history of colonic polyps  Overview:  Repeat 5 years with dr hoover. Due 2027      9. Paresthesia of left leg  Comments:  positional. rtc if worsens or becomes persistent         Follow up: Follow up for 3 [facility] diabetes with labs, medicare wellness after 9/11 with labs.      Care Plan/Goals: Reviewed   Goals    None

## 2024-01-29 NOTE — LETTER
"  This communication is flagged as high priority.        AUTHORIZATION FOR RELEASE OF   CONFIDENTIAL INFORMATION    Dear [facility]  We are seeing Angelia Dooley, date of birth 1955, in the clinic at [facility]. Apolonia Aguilar MD is the patient's PCP. Angelia Dooley has an outstanding lab/procedure at the time we reviewed her chart. In order to help keep her health information updated, she has authorized us to request the following medical record(s):        ( xx )  MAMMOGRAM                                      (  )  COLONOSCOPY      (  )  PAP SMEAR                                          (  )  OUTSIDE LAB RESULTS     (  )  DEXA SCAN                                          (  )  EYE EXAM            (  )  FOOT EXAM                                          (  )  ENTIRE RECORD     (  )  OUTSIDE IMMUNIZATIONS                 (  )  _______________         Please fax records to [facility], Bienvenu-Oubre, Shauna, MD,  [phone]  Attn: Vidya      If you have any questions, please contact Sugey Mcelroy" [facility] Coordinator @ [phone]    Patient Name: Angelia Dooley  : 1955  Patient Phone #: [phone]     "

## 2024-01-29 NOTE — TELEPHONE ENCOUNTER
----- Message from Leigh Ann Gimenez sent at 1/29/2024 12:52 PM CST -----  Regarding: bloodwork  Type:  Needs Medical Advice    Who Called: pt    Would the patient rather a call back or a response via [facility]? C/b    Best Call Back Number: [phone]    Additional Information: pt is wanting to ask questions concerning her blood level today. She not all the blood work just one thing. She said she has never used insulin and does not know why it's on her paperwork.

## 2024-01-29 NOTE — PATIENT INSTRUCTIONS
The Scoop on Statins: Things You Should Know.       Heart disease is the number one cause of death in the [place]. Around 1 in 20 adults aged 20 and older have coronary artery disease [1] and every 33 seconds a person dies from cardiovascular disease in the [place] [2]. The good news is that you can reduce your risk.    What are statins and how do they work?    Statins are a medication that is effective at lowering cholesterol and your risk of heart attack and stroke.     Statins lower low-density lipoprotein (LDL) cholesterol known as the bad cholesterol. Over time, just like the pipes in your house, your arteries or pipes can become filled with waxy cholesterol plaques that can build up and block blood flow to your heart. This can lead to heart attacks and strokes. Statins can draw the cholesterol out of plaques and slow down the production of cholesterol in the liver.       Figure 1: Reproduced from: What Is Atherosclerosis? National Heart, Lung, and Blood [place]. Available at http://www.nhlbi.nih.gov/health/health-topics/topics/atherosclerosis/.    What are the benefits and potential risks of statins?    Statins can decrease the risk of major heart events by 31% and have a 21% decrease in death from heart disease [4]. In addition to lowering bad cholesterol, status increase your HDL or good cholesterol. They also are known to have anti-inflammatory properties, decrease the risk of blood clots, and improve the lining of blood vessels [5].     Statins are safe and effective for most people, but there are some rare side effects that impact a small number of patients. Most patients do not experience any side effects and up to 90% of patients do not experience muscle aches. Statins may also mildly increase glucose levels [6]. If a patient experiences side effects your doctor might try a different statin, lower dose, or decrease the frequency.     There are some common misconceptions about the  potential side effects and risks of taking statins which have been debunked in scientific studies. Common myths include statins having a negative effect on liver health and cognitive health. Scientific studies monitoring thousands of patients have shown that statins do not cause dementia. Use of statins can improve your long-term cognitive and overall health by reducing your risk of stroke and heart disease.     Who are prescribed statins?    Statins are used to treat patients with high cholesterol and many doctors prescribe statins for people at risk of heart disease and stroke. Statins are also recommended for patients with diabetes, those with elevated coronary calcium scores and patients with a history of stroke, heart attack, and other cardiovascular events.     Talk to your doctor about your risk and whether statins might be right for you.    References    1.   Genet CW, Felton AW, Ольга ZI, Gino CAM, John P, Leoncio CL, Gin CM, Ricco AZ, Fredy AK, Candis AE, Hawthorne-Santos Y, Elesther MSV, Khai KR, Og-Milanm C, Vladimir S, Kenzie G, Gloria DG, Hiremattahir S, Victoriano JE, Génesis R, Shane DS, Dino D, Perfecto DA, Richmond J, Ma J, Magnani JW, Faraz ED, Issac ME, Torie SD, Shyanne NI, Duke R, Jenni M, Nena GA, Petros NS, On MP, Gunner EL, Rosaline SS, Cole JH, Eron NY, Wade ND, Wade SS, Lisa K, Ever SS; American Heart Association [place] on Epidemiology and Prevention Statistics Committee and Stroke Statistics Subcommittee. Heart Disease and Stroke Statistics-2023 Update: A Report From the American Heart Association. Circulation. 2023 Feb 21;147(8):l49-f903. doi: 10.1161/CIR.7154107080537274. Epub 2023 Jan 25. Erratum in: Circulation. 2023 Feb 21;147(8):e622. Erratum in: Circulation. 2023 Jul 25;148(4):e4. PMID: 73881649.  2. National Center for Health Statistics. Multiple Cause of Death 4164-8477 on [facility] WONDER Database. Accessed October 28, 2023.  3. Jil Vargas Macreadie I.  Exploitation of Aspergillus terreus for the Production of Natural Statins. J Fungi (Basel). 2016 Apr 30;2(2):13. doi: 10.3390/osq5018875. PMID: 69000878; PMCID: IJJ9925374.  4. Nichelle JANIA, Bryan J, Brianna S. Effect of statins on risk of coronary disease: a meta-analysis of randomized controlled trials. JEREMY. 1999 Dec 22-29;282(24):2340-6. doi: 10.1001/jeremy.282.24.2340. PMID: 26524994.  5. Nargis-Chemo I, Casal-Sae M, Jerson AL. Statins: pros and cons. Med Clin (Barc). 2018 May 23;150(10):398-402. doi: 10.1016/j.medcli.2017.11.030. Epub 2017 Dec 29. PMID: 28347265; PMCID: [id number].  6. Rahul S, Shabnam A, Shabnam S. Statin Therapy: Review of Safety and Potential Side Effects. Acta Cardiol Sin. 2016 Nov;32(6):631-639. doi: 10.6515/vkq49751384v. PMID: 18517249; PMCID: [id number].

## 2024-01-30 PROCEDURE — 84165 PROTEIN E-PHORESIS SERUM: CPT | Performed by: FAMILY MEDICINE

## 2024-01-30 PROCEDURE — 83521 IG LIGHT CHAINS FREE EACH: CPT | Performed by: FAMILY MEDICINE

## 2024-01-30 PROCEDURE — 82607 VITAMIN B-12: CPT | Performed by: FAMILY MEDICINE

## 2024-01-30 PROCEDURE — 82525 ASSAY OF COPPER: CPT | Performed by: FAMILY MEDICINE

## 2024-01-30 PROCEDURE — 82746 ASSAY OF FOLIC ACID SERUM: CPT | Performed by: FAMILY MEDICINE

## 2024-01-30 PROCEDURE — 85025 COMPLETE CBC W/AUTO DIFF WBC: CPT | Performed by: FAMILY MEDICINE

## 2024-01-31 NOTE — PROGRESS NOTES
The following record(s)  below were uploaded for Health Maintenance .    MAMMOGRAM SCREENING    2023

## 2024-02-01 ENCOUNTER — TELEPHONE (OUTPATIENT)
Dept: FAMILY MEDICINE | Facility: CLINIC | Age: 69
End: 2024-02-01
Payer: MEDICARE

## 2024-02-01 NOTE — TELEPHONE ENCOUNTER
Patient called today inquiring about her lab results. I advised her not all her results were ready  and that the office would call her once they were all resulted by Dr. Aguilar. She voiced understanding.      She wanted to let Dr. Aguilar know that she was previously on vitamin d 50,000 units. This was discontinued over a year ago by her endocrinologist for her vitamin d level being too high. Marcelino

## 2024-02-02 DIAGNOSIS — E55.9 VITAMIN D DEFICIENCY: Primary | ICD-10-CM

## 2024-02-02 DIAGNOSIS — D70.8 OTHER NEUTROPENIA: ICD-10-CM

## 2024-02-02 DIAGNOSIS — D89.2 HYPERGAMMAGLOBULINEMIA: Primary | ICD-10-CM

## 2024-02-02 DIAGNOSIS — E53.8 VITAMIN B12 DEFICIENCY: Primary | ICD-10-CM

## 2024-02-08 ENCOUNTER — OFFICE VISIT (OUTPATIENT)
Dept: HEMATOLOGY/ONCOLOGY | Facility: CLINIC | Age: 69
End: 2024-02-08
Payer: MEDICARE

## 2024-02-08 VITALS
SYSTOLIC BLOOD PRESSURE: 137 MMHG | HEIGHT: 63 IN | RESPIRATION RATE: 18 BRPM | OXYGEN SATURATION: 100 % | BODY MASS INDEX: 27.52 KG/M2 | WEIGHT: 155.31 LBS | DIASTOLIC BLOOD PRESSURE: 77 MMHG | TEMPERATURE: 98 F | HEART RATE: 65 BPM

## 2024-02-08 DIAGNOSIS — D89.2 HYPERGAMMAGLOBULINEMIA: ICD-10-CM

## 2024-02-08 DIAGNOSIS — D70.8 OTHER NEUTROPENIA: ICD-10-CM

## 2024-02-08 DIAGNOSIS — R71.8 OTHER ABNORMALITY OF RED BLOOD CELLS: ICD-10-CM

## 2024-02-08 LAB
ALBUMIN SERPL-MCNC: 3.9 G/DL (ref 3.4–4.8)
ALBUMIN/GLOB SERPL: 0.9 RATIO (ref 1.1–2)
ALP SERPL-CCNC: 62 UNIT/L (ref 40–150)
ALT SERPL-CCNC: 13 UNIT/L (ref 0–55)
AST SERPL-CCNC: 21 UNIT/L (ref 5–34)
BASOPHILS # BLD AUTO: 0.04 X10(3)/MCL
BASOPHILS NFR BLD AUTO: 0.8 %
BILIRUB SERPL-MCNC: 0.5 MG/DL
BUN SERPL-MCNC: 17.7 MG/DL (ref 9.8–20.1)
CALCIUM SERPL-MCNC: 9.9 MG/DL (ref 8.4–10.2)
CHLORIDE SERPL-SCNC: 103 MMOL/L (ref 98–107)
CO2 SERPL-SCNC: 31 MMOL/L (ref 23–31)
CREAT SERPL-MCNC: 0.87 MG/DL (ref 0.55–1.02)
EOSINOPHIL # BLD AUTO: 0.45 X10(3)/MCL (ref 0–0.9)
EOSINOPHIL NFR BLD AUTO: 8.6 %
ERYTHROCYTE [DISTWIDTH] IN BLOOD BY AUTOMATED COUNT: 13.4 % (ref 11.5–17)
FERRITIN SERPL-MCNC: 30.53 NG/ML (ref 4.63–204)
FOLATE SERPL-MCNC: 13 NG/ML (ref 7–31.4)
GFR SERPLBLD CREATININE-BSD FMLA CKD-EPI: >60 MLS/MIN/1.73/M2
GLOBULIN SER-MCNC: 4.3 GM/DL (ref 2.4–3.5)
GLUCOSE SERPL-MCNC: 164 MG/DL (ref 82–115)
HCT VFR BLD AUTO: 37.2 % (ref 37–47)
HGB BLD-MCNC: 12.1 G/DL (ref 12–16)
IGA SERPL-MCNC: 216 MG/DL (ref 69–517)
IGG SERPL-MCNC: 2145 MG/DL (ref 522–1631)
IGM SERPL-MCNC: 106 MG/DL (ref 33–293)
IMM GRANULOCYTES # BLD AUTO: 0.01 X10(3)/MCL (ref 0–0.04)
IMM GRANULOCYTES NFR BLD AUTO: 0.2 %
IRON SATN MFR SERPL: 21 % (ref 20–50)
IRON SERPL-MCNC: 72 UG/DL (ref 50–170)
LYMPHOCYTES # BLD AUTO: 1.7 X10(3)/MCL (ref 0.6–4.6)
LYMPHOCYTES NFR BLD AUTO: 32.3 %
MCH RBC QN AUTO: 29.2 PG (ref 27–31)
MCHC RBC AUTO-ENTMCNC: 32.5 G/DL (ref 33–36)
MCV RBC AUTO: 89.9 FL (ref 80–94)
MONOCYTES # BLD AUTO: 0.54 X10(3)/MCL (ref 0.1–1.3)
MONOCYTES NFR BLD AUTO: 10.3 %
NEUTROPHILS # BLD AUTO: 2.52 X10(3)/MCL (ref 2.1–9.2)
NEUTROPHILS NFR BLD AUTO: 47.8 %
PLATELET # BLD AUTO: 254 X10(3)/MCL (ref 130–400)
PMV BLD AUTO: 9.8 FL (ref 7.4–10.4)
POTASSIUM SERPL-SCNC: 3.4 MMOL/L (ref 3.5–5.1)
PROT SERPL-MCNC: 8.2 GM/DL (ref 5.8–7.6)
RBC # BLD AUTO: 4.14 X10(6)/MCL (ref 4.2–5.4)
SODIUM SERPL-SCNC: 140 MMOL/L (ref 136–145)
TIBC SERPL-MCNC: 278 UG/DL (ref 70–310)
TIBC SERPL-MCNC: 350 UG/DL (ref 250–450)
TRANSFERRIN SERPL-MCNC: 343 MG/DL (ref 173–360)
VIT B12 SERPL-MCNC: 748 PG/ML (ref 213–816)
WBC # SPEC AUTO: 5.26 X10(3)/MCL (ref 4.5–11.5)

## 2024-02-08 PROCEDURE — 82784 ASSAY IGA/IGD/IGG/IGM EACH: CPT | Performed by: INTERNAL MEDICINE

## 2024-02-08 PROCEDURE — 99215 OFFICE O/P EST HI 40 MIN: CPT | Mod: PBBFAC | Performed by: INTERNAL MEDICINE

## 2024-02-08 PROCEDURE — 86235 NUCLEAR ANTIGEN ANTIBODY: CPT | Performed by: INTERNAL MEDICINE

## 2024-02-08 PROCEDURE — 99999 PR PBB SHADOW E&M-EST. PATIENT-LVL V: CPT | Mod: PBBFAC,,, | Performed by: INTERNAL MEDICINE

## 2024-02-08 PROCEDURE — 83540 ASSAY OF IRON: CPT | Performed by: INTERNAL MEDICINE

## 2024-02-08 PROCEDURE — 80053 COMPREHEN METABOLIC PANEL: CPT | Performed by: INTERNAL MEDICINE

## 2024-02-08 PROCEDURE — 82728 ASSAY OF FERRITIN: CPT | Performed by: INTERNAL MEDICINE

## 2024-02-08 PROCEDURE — 84165 PROTEIN E-PHORESIS SERUM: CPT | Performed by: INTERNAL MEDICINE

## 2024-02-08 PROCEDURE — 83521 IG LIGHT CHAINS FREE EACH: CPT | Performed by: INTERNAL MEDICINE

## 2024-02-08 PROCEDURE — 82746 ASSAY OF FOLIC ACID SERUM: CPT | Performed by: INTERNAL MEDICINE

## 2024-02-08 PROCEDURE — 82607 VITAMIN B-12: CPT | Performed by: INTERNAL MEDICINE

## 2024-02-08 PROCEDURE — 85025 COMPLETE CBC W/AUTO DIFF WBC: CPT | Performed by: INTERNAL MEDICINE

## 2024-02-08 PROCEDURE — 36415 COLL VENOUS BLD VENIPUNCTURE: CPT | Performed by: INTERNAL MEDICINE

## 2024-02-08 PROCEDURE — 99204 OFFICE O/P NEW MOD 45 MIN: CPT | Mod: S$PBB,,, | Performed by: INTERNAL MEDICINE

## 2024-02-08 NOTE — PROGRESS NOTES
HEMATOLOGY/ONCOLOGY OFFICE CLINIC VISIT    Visit Information:    Initial Evaluation:  02/08/2024  Referring Provider: Dr Cathy Espinoza  Other providers:  Code status:  Not addressed    Diagnosis:  Gammopathy  leukopenia     Present treatment:    Treatment/Oncology history:        Imaging:    Pathology:      CLINICAL HISTORY:       Patient: Angelia Dooley is a 68 y.o. female with history of diabetes, hypertension, hyperlipidemia kindly referred for gammopathy.  Reviewing electronic medical record platelet counts were completely normal back in 2015 and 2017.  Total protein was 8.4 with a globulin of 4.6 back in 11/12/2015 it essentially normalized by 04/06/2022 with normal total protein and mildly elevated globulin 3.9.  Kidney function was normal and remains normal.    Patient is here today by herself.  She voices no concerns.  She is nervous and anxious but otherwise doing well.  She does not have any family history of multiple myeloma, lymphoma, leukemia or any blood disorders.    Chief Complaint: New Patient (NP referred by DR. Apolonia Aguilar for Hypergammablobinemia, Neutropenia.)      Interval History:        Past Medical History:   Diagnosis Date    Diabetes mellitus, type 2     Hyperlipidemia     Hypertension     Personal history of colonic polyps 02/16/2022      Past Surgical History:   Procedure Laterality Date    CHOLECYSTECTOMY      COLONOSCOPY W/ POLYPECTOMY  02/16/2022    CYST REMOVAL  10/07/2023    scalp    HYSTERECTOMY      LEFT HEART CATHETERIZATION Left 11/10/2023    Procedure: Left heart cath;  Surgeon: Nickolas Cooper MD;  Location: [facility] CATH LAB;  Service: Cardiology;  Laterality: Left;    MYOMECTOMY       Family History   Problem Relation Age of Onset    Diabetes type I Mother     Heart failure Mother     Heart failure Father     Hypertension Father      Social Connections: Unknown (1/29/2024)    Social Connection and Isolation Panel [NHANES]     Frequency of Communication with  Friends and Family: More than three times a week     Frequency of Social Gatherings with Friends and Family: More than three times a week     Attends Jainism Services: Not on file     Active Member of Clubs or Organizations: Not on file     Attends Club or Organization Meetings: Not on file     Marital Status: Not on file       Review of patient's allergies indicates:   Allergen Reactions    Acetaminophen-codeine     Bactrim [sulfamethoxazole-trimethoprim]     Cyclobenzaprine     Hydrocodone-acetaminophen     Tramadol     Versed [midazolam] Nausea Only      Current Outpatient Medications on File Prior to Visit   Medication Sig Dispense Refill    ALPRAZolam (XANAX) 0.25 MG tablet Take 1 tablet (0.25 mg total) by mouth 3 (three) times daily. 90 tablet 0    atorvastatin (LIPITOR) 10 MG tablet Take 1 tablet (10 mg total) by mouth once daily. 30 tablet 11    bisoprolol (ZEBETA) 5 MG tablet See Instructions, TAKE 1 TABLET BY MOUTH EVERY DAY, # 90 tab(s), 3 Refill(s), Pharmacy: [facility]/pharmacy #[id number], 163, cm, Height/Length Dosing, 03/31/22 10:47:00 CDT, 69, kg, Weight Dosing, 03/31/22 10:47:00 CDT 90 tablet 3    blood sugar diagnostic Strp To check BG 1 times daily, to use with insurance preferred meter 100 strip 3    CONTOUR NEXT ONE METER Misc USE TO TEST BLOOD GLUCOSE TWICE DAILY      glimepiride (AMARYL) 4 MG tablet Take 4 mg by mouth 2 (two) times daily.      JANUVIA 100 mg Tab Take 100 mg by mouth every evening.      pioglitazone (ACTOS) 15 MG tablet Take 15 mg by mouth once daily.      PREMARIN vaginal cream Place 1 g vaginally every 7 days.      ramipriL (ALTACE) 10 MG capsule Take 10 mg by mouth once daily.      triamterene-hydrochlorothiazide 37.5-25 mg (DYAZIDE) 37.5-25 mg per capsule Take 1 capsule by mouth once daily.      vitamin E acetate (VITAMIN E ORAL) Take 400 Units by mouth once daily.       Current Facility-Administered Medications on File Prior to Visit   Medication Dose Route Frequency Provider Last  Rate Last Admin    diphenhydrAMINE capsule 50 mg  50 mg Oral On Call Procedure Nickolas Cooper MD   50 mg at 11/10/23 0812      Review of Systems   Constitutional:  Negative for activity change, appetite change, chills, fatigue, fever and unexpected weight change.   HENT:  Negative for mouth dryness, mouth sores, nosebleeds, sore throat and trouble swallowing.    Eyes:  Negative for visual disturbance.   Respiratory:  Negative for cough and shortness of breath.    Cardiovascular:  Negative for chest pain, palpitations and leg swelling.   Gastrointestinal:  Negative for abdominal distention, abdominal pain, blood in stool, change in bowel habit, constipation, diarrhea, nausea and vomiting.   Endocrine: Negative.    Genitourinary:  Negative for dysuria, frequency, hematuria and urgency.   Musculoskeletal:  Negative for arthralgias, back pain, myalgias and neck pain.   Integumentary:  Negative for rash.   Neurological:  Negative for dizziness, tremors, syncope, speech difficulty, weakness, light-headedness, numbness, headaches and memory loss.   Hematological:  Does not bruise/bleed easily.   Psychiatric/Behavioral:  Negative for confusion and suicidal ideas. The patient is nervous/anxious.               There were no vitals filed for this visit.   Wt Readings from Last 6 Encounters:   01/29/24 71.2 kg (157 lb)   12/05/23 69.3 kg (152 lb 11.2 oz)   11/27/23 70.2 kg (154 lb 12.2 oz)   11/10/23 67.7 kg (149 lb 4 oz)   10/12/23 68 kg (150 lb)   10/09/23 68 kg (150 lb)     There is no height or weight on file to calculate BMI.  There is no height or weight on file to calculate BSA.  Physical Exam  Vitals and nursing note reviewed.   Constitutional:       General: She is not in acute distress.     Appearance: Normal appearance. She is well-developed.   HENT:      Head: Normocephalic and atraumatic.      Mouth/Throat:      Mouth: Mucous membranes are moist.   Eyes:      General: No scleral icterus.     Extraocular  Movements: Extraocular movements intact.      Conjunctiva/sclera: Conjunctivae normal.      Pupils: Pupils are equal, round, and reactive to light.   Neck:      Vascular: No JVD.   Cardiovascular:      Rate and Rhythm: Normal rate and regular rhythm.      Heart sounds: No murmur heard.  Pulmonary:      Effort: Pulmonary effort is normal.      Breath sounds: Normal breath sounds. No wheezing or rhonchi.   Abdominal:      General: Bowel sounds are normal. There is no distension.      Palpations: Abdomen is soft. There is no mass.      Tenderness: There is no abdominal tenderness.   Musculoskeletal:         General: No swelling or deformity.      Cervical back: Neck supple.   Lymphadenopathy:      Head:      Right side of head: No submandibular adenopathy.      Left side of head: No submandibular adenopathy.      Cervical: No cervical adenopathy.      Upper Body:      Right upper body: No supraclavicular or axillary adenopathy.      Left upper body: No supraclavicular or axillary adenopathy.      Lower Body: No right inguinal adenopathy. No left inguinal adenopathy.   Skin:     General: Skin is warm.      Coloration: Skin is not jaundiced.      Findings: No lesion or rash.      Nails: There is no clubbing.   Neurological:      General: No focal deficit present.      Mental Status: She is alert and oriented to person, place, and time.      Cranial Nerves: Cranial nerves 2-12 are intact.   Psychiatric:         Attention and Perception: Attention normal.         Mood and Affect: Mood is anxious.         Behavior: Behavior is cooperative.         Judgment: Judgment normal.           Laboratory:  CBC with Differential:  Lab Results   Component Value Date    WBC 4.40 (L) 01/30/2024    RBC 4.21 01/30/2024    HGB 12.3 01/30/2024    HCT 37.4 01/30/2024    MCV 88.8 01/30/2024    MCH 29.2 01/30/2024    MCHC 32.9 (L) 01/30/2024    RDW 13.7 01/30/2024     01/30/2024    MPV 10.9 (H) 01/30/2024        CMP:  Sodium Level   Date  Value Ref Range Status   01/25/2024 141 136 - 145 mmol/L Final     Potassium Level   Date Value Ref Range Status   01/25/2024 3.8 3.5 - 5.1 mmol/L Final     Carbon Dioxide   Date Value Ref Range Status   01/25/2024 32 (H) 23 - 31 mmol/L Final     Blood Urea Nitrogen   Date Value Ref Range Status   01/25/2024 18.4 9.8 - 20.1 mg/dL Final     Creatinine   Date Value Ref Range Status   01/25/2024 0.83 0.55 - 1.02 mg/dL Final     Calcium Level Total   Date Value Ref Range Status   01/25/2024 9.8 8.4 - 10.2 mg/dL Final     Albumin   Date Value Ref Range Status   01/30/2024 3.4 g/dL Final     Bilirubin Total   Date Value Ref Range Status   01/25/2024 0.4 <=1.5 mg/dL Final     Alkaline Phosphatase   Date Value Ref Range Status   01/25/2024 55 40 - 150 unit/L Final     Aspartate Aminotransferase   Date Value Ref Range Status   01/25/2024 19 5 - 34 unit/L Final     Alanine Aminotransferase   Date Value Ref Range Status   01/25/2024 12 0 - 55 unit/L Final     Estimated GFR-Non    Date Value Ref Range Status   04/06/2022 >60        Latest Reference Range & Units 01/30/24 09:18   Alpha 1 Glob 0.00 - 0.40 gm/dl 0.27   Alpha 2 Glob 0.40 - 1.00 gm/dl 0.84   Beta Glob 0.70 - 1.30 gm/dl 1.55 (H)   Gamma Globulin 0.40 - 1.80 gm/dl 2.14 (H)   Pathology Review  SPEP: No M-spike indicative of a monoclonal protein is identified.  DEVIN:  Immunofixation shows a normal pattern of immunoglobulins.  No monoclonal bands are detected. Venu Hui M.D.    Kappa/Lambda FLC Ratio 0.2600 - 1.65  1.77 (H)   % Albumin 48.1 - 59.5  41.54 (L)   % Alpha-1 2.3 - 4.9  3.25   % Alpha-2 6.9 - 13  10.21   Beta Glob % 13.8 - 19.7  18.90   Gamma Globulin % 10.1 - 21.9  26.10 (H)   [place] Free Light Chain 0.3300 - 1.94 mg/dL 1.97 (H)   Lambda Free Light Chain 0.5700 - 2.63 mg/dL 1.11   M SPIKE  See Comments   M Robert %  See Comments   (H): Data is abnormally high  (L): Data is abnormally low    Latest Reference Range & Units 01/30/24 09:18    Folate 7.0 - 31.4 ng/mL 13.8   Vitamin B12 213 - 816 pg/mL 241          Assessment:       1. Hypergammaglobulinemia    2. Other neutropenia      Discussed with the patient that Multiple myeloma is a type of bone marrow cancer that forms in a type of white blood cell called a plasma cell. Plasma cells help you fight infections by making antibodies that recognize and attack germs but when they transformed into cancer cells then they are not efficient and don't work well. This cells can affect not only the bone marrow but the bones in general causing distinctive bone lesions called Lytic lesions. It also can affect the kidney. Discussed prognosis and treatment recommendations and indications.  Discussed Monoclonal gammopathy of undetermined significance (MGUS) and that is the most common of a spectrum of diseases called plasma cell dyscrasias. The term MGUS denotes the presence of a monoclonal immunoglobulin (Ig), or M-protein, in the serum or urine in persons without evidence of multiple myeloma (MM), Waldenström macroglobulinemia (WM), amyloidosis (AL) or other lymphoproliferative disorders. The risk of progression to MM or other lymphoproliferative disorder is present at a constant rate throughout the remainder of a patient's life. This risk has been quantified at 1% per year.  I also discussed with the patient that elevated globulin can be associated with autoimmune disorders or recent infections/inflammation.  This is a disorder of the immune system so anything that affect immune system can increase the globulin level.     Discussed with patient that leukopenia refers to a decreased in the total number of WBCs due to any cause. It can decrease in disease-fighting cells circulating in the blood and increase chances of infections.  A low white blood cell count usually is caused by one of the following: Viral infections or other Infectious diseases,  congenital disorders characterized by diminished bone marrow  function, cancer or other malignancies that damage bone marrow, ie, Leukemia, lymphoma, MDS, etc. Autoimmune disorders that destroy white blood cells or bone marrow cells, Lupus, RA, etc. Drugs or Certain medications, such as antibiotics, antiseizure and diuretics. Hypersplenism, a premature destruction of blood cells by the spleen.  Sometimes leukopenia can be secondary to nutritional deficit, i.e.,  Iron deficiency anemia, B12 or folate deficiency, etc. Sometimes endocrine disorder like thyroid disease as well.           Plan:       She will have basic blood work today for evaluation of leukopenia  She will have multiple myeloma workup including 24 hours urine collection  I will defer bone marrow biopsy for now but may need in the future  RTC in 2 weeks to discuss results  The patient was seen, interviewed and examined. Pertinent lab and radiology studies were reviewed.   The patient was given ample opportunity to ask questions, and to the best of my abilities, all questions answered to satisfaction; patient demonstrated understanding of what we discussed and agreeable to the plan. Pt instructed to call should develop concerning signs/symptoms or have further questions.     I'd like to thank for referring and allowing me the opportunity to participate in the care of this patient and if any questions, please do not hesitate to call the office at [phone].         Annamaria Gates MD  Hematology/Oncology

## 2024-02-09 LAB
KAPPA LC FREE SER-MCNC: 1.94 MG/DL (ref 0.33–1.94)
KAPPA LC FREE/LAMBDA FREE SER: 1.85 {RATIO} (ref 0.26–1.65)
LAMBDA LC FREE SERPL-MCNC: 1.05 MG/DL (ref 0.57–2.63)

## 2024-02-10 LAB
ANTINUCLEAR ANTIBODY SCREEN (OHS): NEGATIVE
CENTROMERE QUANT (OHS): <0.4 U/ML
DSDNA AB QUANT (OHS): 1.3 IU/ML
JO-1 AB QUANT (OHS): <0.3 U/ML
RNP70 AB QUANT (OHS): 1.3 U/ML
SCL-70S AB QUANT (OHS): 0.6 U/ML
SMITH AB QUANT (OHS): <0.7 U/ML
SSA(RO) AB QUANT (OHS): 0.4 U/ML
SSB(LA) AB QUANT (OHS): <0.4 U/ML
U1RNP AB QUANT (OHS): 3.5 U/ML

## 2024-02-12 LAB
ALBUMIN % SPEP (OHS): 44.76
ALBUMIN SERPL-MCNC: 3.6 G/DL (ref 3.4–4.8)
ALBUMIN/GLOB SERPL: 0.8 RATIO (ref 1.1–2)
ALPHA 1 GLOB (OHS): 0.27 GM/DL
ALPHA 1 GLOB% (OHS): 3.35
ALPHA 2 GLOB % (OHS): 9.66
ALPHA 2 GLOB (OHS): 0.77 GM/DL
BETA GLOB (OHS): 1.34 GM/DL
BETA GLOB% (OHS): 16.71
GAMMA GLOBULIN % (OHS): 25.51
GAMMA GLOBULIN (OHS): 2.04 GM/DL
GLOBULIN SER-MCNC: 4.4 GM/DL (ref 2.4–3.5)
M SPIKE % (OHS): ABNORMAL
M SPIKE (OHS): ABNORMAL
PATH REV: NORMAL
PROT SERPL-MCNC: 8 GM/DL (ref 5.8–7.6)

## 2024-02-16 ENCOUNTER — APPOINTMENT (OUTPATIENT)
Dept: LAB | Facility: HOSPITAL | Age: 69
End: 2024-02-16
Attending: INTERNAL MEDICINE
Payer: MEDICARE

## 2024-02-20 LAB
ALBUMIN 24H UR ELPH-MRATE: NORMAL MG/24 H
COLLECT DURATION TIME UR: 24 H
M FLAG M-PROTEIN ISOTYPE MS, 24 HR, U: NEGATIVE
M M-PROTEIN ISOTYPE MS, 24 HR, U: NORMAL
PROT 24H UR-MRATE: <80 MG/24 H
PROT PATTERN 24H UR ELPH-IMP: NORMAL
SPECIMEN VOL 24H UR: 2000 ML

## 2024-02-29 ENCOUNTER — OFFICE VISIT (OUTPATIENT)
Dept: HEMATOLOGY/ONCOLOGY | Facility: CLINIC | Age: 69
End: 2024-02-29
Payer: MEDICARE

## 2024-02-29 VITALS
WEIGHT: 158.13 LBS | BODY MASS INDEX: 28.02 KG/M2 | HEIGHT: 63 IN | HEART RATE: 66 BPM | SYSTOLIC BLOOD PRESSURE: 133 MMHG | TEMPERATURE: 98 F | OXYGEN SATURATION: 100 % | DIASTOLIC BLOOD PRESSURE: 77 MMHG | RESPIRATION RATE: 18 BRPM

## 2024-02-29 DIAGNOSIS — D70.8 OTHER NEUTROPENIA: ICD-10-CM

## 2024-02-29 DIAGNOSIS — R71.8 OTHER ABNORMALITY OF RED BLOOD CELLS: ICD-10-CM

## 2024-02-29 DIAGNOSIS — D89.2 HYPERGAMMAGLOBULINEMIA: Primary | ICD-10-CM

## 2024-02-29 PROCEDURE — 99213 OFFICE O/P EST LOW 20 MIN: CPT | Mod: S$PBB,,, | Performed by: INTERNAL MEDICINE

## 2024-02-29 PROCEDURE — 99999 PR PBB SHADOW E&M-EST. PATIENT-LVL III: CPT | Mod: PBBFAC,,, | Performed by: INTERNAL MEDICINE

## 2024-02-29 PROCEDURE — 99213 OFFICE O/P EST LOW 20 MIN: CPT | Mod: PBBFAC | Performed by: INTERNAL MEDICINE

## 2024-02-29 NOTE — PROGRESS NOTES
HEMATOLOGY/ONCOLOGY OFFICE CLINIC VISIT    Visit Information:    Initial Evaluation:  02/08/2024  Referring Provider: Dr Cathy Espinoza  Other providers:  Code status:  Not addressed    Diagnosis:  Gammopathy  leukopenia         CLINICAL HISTORY:       Patient: Angelia Dooley is a 68 y.o. female with history of diabetes, hypertension, hyperlipidemia kindly referred for gammopathy.  Reviewing electronic medical record platelet counts were completely normal back in 2015 and 2017.  Total protein was 8.4 with a globulin of 4.6 back in 11/12/2015 it essentially normalized by 04/06/2022 with normal total protein and mildly elevated globulin 3.9.  Kidney function was normal and remains normal.    Patient is here today by herself.  She voices no concerns.  She is nervous and anxious but otherwise doing well.  She does not have any family history of multiple myeloma, lymphoma, leukemia or any blood disorders.    Chief Complaint: 2 Week Follow Up (Results.)      Interval History:    Patient is here today to discuss results.  She is by herself.  She is a little bit anxious but otherwise she does not have any complaints at all.  Leukopenia resolved.  Multiple myeloma workup is negative.  Kappa/lambda ratio mildly elevated but no monoclonal protein seen in the blood or the urine.  No fever, chills, sweats.  No chest pain or shortness of breath.  No changes in bowel habits.    Past Medical History:   Diagnosis Date    Diabetes mellitus, type 2     Hyperlipidemia     Hypertension     Personal history of colonic polyps 02/16/2022      Past Surgical History:   Procedure Laterality Date    CHOLECYSTECTOMY      COLONOSCOPY W/ POLYPECTOMY  02/16/2022    CYST REMOVAL  10/07/2023    scalp    HYSTERECTOMY      LEFT HEART CATHETERIZATION Left 11/10/2023    Procedure: Left heart cath;  Surgeon: Nickolas Cooper MD;  Location: [facility] CATH LAB;  Service: Cardiology;  Laterality: Left;    MYOMECTOMY       Family History   Problem  Relation Age of Onset    Diabetes type I Mother     Heart failure Mother     Heart failure Father     Hypertension Father      Social Connections: Unknown (1/29/2024)    Social Connection and Isolation Panel [NHANES]     Frequency of Communication with Friends and Family: More than three times a week     Frequency of Social Gatherings with Friends and Family: More than three times a week     Attends [employer] Services: Not on file     Active Member of Clubs or Organizations: Not on file     Attends Club or Organization Meetings: Not on file     Marital Status: Not on file       Review of patient's allergies indicates:   Allergen Reactions    Acetaminophen-codeine     Bactrim [sulfamethoxazole-trimethoprim]     Cyclobenzaprine     Hydrocodone-acetaminophen     Tramadol     Versed [midazolam] Nausea Only      Current Outpatient Medications on File Prior to Visit   Medication Sig Dispense Refill    ALPRAZolam (XANAX) 0.25 MG tablet Take 1 tablet (0.25 mg total) by mouth 3 (three) times daily. 90 tablet 0    atorvastatin (LIPITOR) 10 MG tablet Take 1 tablet (10 mg total) by mouth once daily. 30 tablet 11    bisoprolol (ZEBETA) 5 MG tablet See Instructions, TAKE 1 TABLET BY MOUTH EVERY DAY, # 90 tab(s), 3 Refill(s), Pharmacy: [facility]/pharmacy #[id number], 163, cm, Height/Length Dosing, 03/31/22 10:47:00 CDT, 69, kg, Weight Dosing, 03/31/22 10:47:00 CDT 90 tablet 3    blood sugar diagnostic Strp To check BG 1 times daily, to use with insurance preferred meter 100 strip 3    CONTOUR NEXT ONE METER Misc USE TO TEST BLOOD GLUCOSE TWICE DAILY      glimepiride (AMARYL) 4 MG tablet Take 4 mg by mouth 2 (two) times daily.      JANUVIA 100 mg Tab Take 100 mg by mouth every evening.      pioglitazone (ACTOS) 15 MG tablet Take 15 mg by mouth once daily.      PREMARIN vaginal cream Place 1 g vaginally every 7 days.      ramipriL (ALTACE) 10 MG capsule Take 10 mg by mouth once daily.      triamterene-hydrochlorothiazide 37.5-25 mg (DYAZIDE)  "37.5-25 mg per capsule Take 1 capsule by mouth once daily.      vitamin E acetate (VITAMIN E ORAL) Take 400 Units by mouth once daily.       Current Facility-Administered Medications on File Prior to Visit   Medication Dose Route Frequency Provider Last Rate Last Admin    diphenhydrAMINE capsule 50 mg  50 mg Oral On Call Procedure Nickolas Cooper MD   50 mg at 11/10/23 0812      Review of Systems   Constitutional:  Negative for activity change, appetite change, chills, fatigue, fever and unexpected weight change.   HENT:  Negative for mouth dryness, mouth sores, nosebleeds, sore throat and trouble swallowing.    Eyes:  Negative for visual disturbance.   Respiratory:  Negative for cough and shortness of breath.    Cardiovascular:  Negative for chest pain, palpitations and leg swelling.   Gastrointestinal:  Negative for abdominal distention, abdominal pain, blood in stool, change in bowel habit, constipation, diarrhea, nausea and vomiting.   Endocrine: Negative.    Genitourinary:  Negative for dysuria, frequency, hematuria and urgency.   Musculoskeletal:  Negative for arthralgias, back pain, myalgias and neck pain.   Integumentary:  Negative for rash.   Neurological:  Negative for dizziness, tremors, syncope, speech difficulty, weakness, light-headedness, numbness, headaches and memory loss.   Hematological:  Does not bruise/bleed easily.   Psychiatric/Behavioral:  Negative for confusion and suicidal ideas. The patient is nervous/anxious.               Vitals:    02/29/24 1516   BP: 133/77   BP Location: Left arm   Patient Position: Sitting   Pulse: 66   Resp: 18   Temp: 98 °F (36.7 °C)   TempSrc: Oral   SpO2: 100%   Weight: 71.7 kg (158 lb 1.6 oz)   Height: 5' 3" (1.6 m)      Wt Readings from Last 6 Encounters:   02/29/24 71.7 kg (158 lb 1.6 oz)   02/08/24 70.4 kg (155 lb 4.8 oz)   01/29/24 71.2 kg (157 lb)   12/05/23 69.3 kg (152 lb 11.2 oz)   11/27/23 70.2 kg (154 lb 12.2 oz)   11/10/23 67.7 kg (149 lb 4 oz) "     Body mass index is 28.01 kg/m².  Body surface area is 1.79 meters squared.  Physical Exam  Vitals and nursing note reviewed.   Constitutional:       General: She is not in acute distress.     Appearance: Normal appearance. She is well-developed.   HENT:      Head: Normocephalic and atraumatic.      Mouth/Throat:      Mouth: Mucous membranes are moist.   Eyes:      General: No scleral icterus.     Extraocular Movements: Extraocular movements intact.      Conjunctiva/sclera: Conjunctivae normal.      Pupils: Pupils are equal, round, and reactive to light.   Neck:      Vascular: No JVD.   Cardiovascular:      Rate and Rhythm: Normal rate and regular rhythm.      Heart sounds: No murmur heard.  Pulmonary:      Effort: Pulmonary effort is normal.      Breath sounds: Normal breath sounds. No wheezing or rhonchi.   Abdominal:      General: Bowel sounds are normal. There is no distension.      Palpations: Abdomen is soft. There is no mass.      Tenderness: There is no abdominal tenderness.   Musculoskeletal:         General: No swelling or deformity.      Cervical back: Neck supple.   Lymphadenopathy:      Head:      Right side of head: No submandibular adenopathy.      Left side of head: No submandibular adenopathy.      Cervical: No cervical adenopathy.      Upper Body:      Right upper body: No supraclavicular or axillary adenopathy.      Left upper body: No supraclavicular or axillary adenopathy.      Lower Body: No right inguinal adenopathy. No left inguinal adenopathy.   Skin:     General: Skin is warm.      Coloration: Skin is not jaundiced.      Findings: No lesion or rash.      Nails: There is no clubbing.   Neurological:      General: No focal deficit present.      Mental Status: She is alert and oriented to person, place, and time.      Cranial Nerves: Cranial nerves 2-12 are intact.   Psychiatric:         Attention and Perception: Attention normal.         Mood and Affect: Mood is anxious.         Behavior:  Behavior is cooperative.         Judgment: Judgment normal.     Laboratory:  CBC with Differential:  Lab Results   Component Value Date    WBC 5.26 02/08/2024    RBC 4.14 (L) 02/08/2024    HGB 12.1 02/08/2024    HCT 37.2 02/08/2024    MCV 89.9 02/08/2024    MCH 29.2 02/08/2024    MCHC 32.5 (L) 02/08/2024    RDW 13.4 02/08/2024     02/08/2024    MPV 9.8 02/08/2024        CMP:  Sodium Level   Date Value Ref Range Status   02/08/2024 140 136 - 145 mmol/L Final     Potassium Level   Date Value Ref Range Status   02/08/2024 3.4 (L) 3.5 - 5.1 mmol/L Final     Carbon Dioxide   Date Value Ref Range Status   02/08/2024 31 23 - 31 mmol/L Final     Blood Urea Nitrogen   Date Value Ref Range Status   02/08/2024 17.7 9.8 - 20.1 mg/dL Final     Creatinine   Date Value Ref Range Status   02/08/2024 0.87 0.55 - 1.02 mg/dL Final     Calcium Level Total   Date Value Ref Range Status   02/08/2024 9.9 8.4 - 10.2 mg/dL Final     Albumin   Date Value Ref Range Status   01/30/2024 3.4 g/dL Final     Albumin Level   Date Value Ref Range Status   02/08/2024 3.9 3.4 - 4.8 g/dL Final   02/08/2024 3.6 3.4 - 4.8 g/dL Final     Bilirubin Total   Date Value Ref Range Status   02/08/2024 0.5 <=1.5 mg/dL Final     Alkaline Phosphatase   Date Value Ref Range Status   02/08/2024 62 40 - 150 unit/L Final     Aspartate Aminotransferase   Date Value Ref Range Status   02/08/2024 21 5 - 34 unit/L Final     Alanine Aminotransferase   Date Value Ref Range Status   02/08/2024 13 0 - 55 unit/L Final     Estimated GFR-Non    Date Value Ref Range Status   04/06/2022 >60        Latest Reference Range & Units 01/30/24 09:18   Alpha 1 Glob 0.00 - 0.40 gm/dl 0.27   Alpha 2 Glob 0.40 - 1.00 gm/dl 0.84   Beta Glob 0.70 - 1.30 gm/dl 1.55 (H)   Gamma Globulin 0.40 - 1.80 gm/dl 2.14 (H)   Pathology Review  SPEP: No M-spike indicative of a monoclonal protein is identified.  DEVIN:  Immunofixation shows a normal pattern of immunoglobulins.  No  monoclonal bands are detected. Venu Hui M.D.    Kappa/Lambda FLC Ratio 0.2600 - 1.65  1.77 (H)   % Albumin 48.1 - 59.5  41.54 (L)   % Alpha-1 2.3 - 4.9  3.25   % Alpha-2 6.9 - 13  10.21   Beta Glob % 13.8 - 19.7  18.90   Gamma Globulin % 10.1 - 21.9  26.10 (H)   [place] Free Light Chain 0.3300 - 1.94 mg/dL 1.97 (H)   Lambda Free Light Chain 0.5700 - 2.63 mg/dL 1.11   M SPIKE  See Comments   M Robert %  See Comments   (H): Data is abnormally high  (L): Data is abnormally low    Latest Reference Range & Units 01/30/24 09:18   Folate 7.0 - 31.4 ng/mL 13.8   Vitamin B12 213 - 816 pg/mL 241          Assessment:       1. Hypergammaglobulinemia    2. Other neutropenia    3. Other abnormality of red blood cells            Plan:       Patient is doing well and voices no concerns.  All blood work came back essentially normal.  No indication for bone marrow biopsy at this time.    RTC in 6 months with labs.    If everything is normal then she will return in as needed basis.     The patient was seen, interviewed and examined. Pertinent lab and radiology studies were reviewed.   The patient was given ample opportunity to ask questions, and to the best of my abilities, all questions answered to satisfaction; patient demonstrated understanding of what we discussed and agreeable to the plan. Pt instructed to call should develop concerning signs/symptoms or have further questions.     Annamaria Gates MD  Hematology/Oncology

## 2024-03-11 ENCOUNTER — TELEPHONE (OUTPATIENT)
Dept: FAMILY MEDICINE | Facility: CLINIC | Age: 69
End: 2024-03-11
Payer: MEDICARE

## 2024-03-11 PROBLEM — M79.10 MYALGIA DUE TO STATIN: Status: ACTIVE | Noted: 2024-03-11

## 2024-03-11 PROBLEM — T46.6X5A MYALGIA DUE TO STATIN: Status: ACTIVE | Noted: 2024-03-11

## 2024-03-11 NOTE — TELEPHONE ENCOUNTER
----- Message from [facility] sent at 3/11/2024  1:18 PM CDT -----  .Type:  Needs Medical Advice    Who Called:  pt    Symptoms (please be specific):  joint pain     How long has patient had these symptoms:   3 weeks    Pharmacy name and phone #:   [facility] on [address]    Would the patient rather a call back or a response via [facility]?      Best Call Back Number:  [phone]    Additional Information:  pt states ever since she started the Lipitor medication she is experiencing joint pain all over she is still taking the medication thanks

## 2024-04-04 DIAGNOSIS — E11.65 TYPE 2 DIABETES MELLITUS WITH HYPERGLYCEMIA, WITH LONG-TERM CURRENT USE OF INSULIN: Primary | ICD-10-CM

## 2024-04-04 DIAGNOSIS — Z79.4 TYPE 2 DIABETES MELLITUS WITH HYPERGLYCEMIA, WITH LONG-TERM CURRENT USE OF INSULIN: Primary | ICD-10-CM

## 2024-04-04 RX ORDER — PIOGLITAZONEHYDROCHLORIDE 15 MG/1
15 TABLET ORAL DAILY
Qty: 90 TABLET | Refills: 3 | Status: SHIPPED | OUTPATIENT
Start: 2024-04-04

## 2024-04-04 NOTE — TELEPHONE ENCOUNTER
----- Message from Emre Arce sent at 4/4/2024  9:17 AM CDT -----  Type:  RX Refill Request    Who Called: Angelia  Refill or New Rx: Refill   RX Name and Strength:  pioglitazone (ACTOS) 15 MG tablet  How is the patient currently taking it? (ex. 1XDay):  Is this a 30 day or 90 day RX: 90 day supply  Preferred Pharmacy with phone number: [facility]  Local or Mail Order: Local   Ordering Provider: Cathy   Would the patient rather a call back or a response via [facility]?   Best Call Back Number: [phone]  Additional Information: Please call with any questions.

## 2024-04-04 NOTE — TELEPHONE ENCOUNTER
Pt notified that the script was sent to the pharmacy and of the recommendations. Verbal understanding given

## 2024-04-22 ENCOUNTER — TELEPHONE (OUTPATIENT)
Dept: FAMILY MEDICINE | Facility: CLINIC | Age: 69
End: 2024-04-22
Payer: MEDICARE

## 2024-04-22 DIAGNOSIS — E11.65 TYPE 2 DIABETES MELLITUS WITH HYPERGLYCEMIA, WITHOUT LONG-TERM CURRENT USE OF INSULIN: ICD-10-CM

## 2024-04-22 DIAGNOSIS — R79.89 HIGH SERUM VITAMIN D: ICD-10-CM

## 2024-04-22 DIAGNOSIS — Z00.00 ENCOUNTER FOR WELLNESS EXAMINATION: Primary | ICD-10-CM

## 2024-04-22 NOTE — TELEPHONE ENCOUNTER
Pt states that she has a hx of Vitamin d Def. She states at one time her levels were too high so she was advised to stop it. She was told that her Vitamin D level would be checked with the labs that are due for her upcoming appt. I am not seeing that a Vitamin D level is ordered. Please advise

## 2024-04-22 NOTE — TELEPHONE ENCOUNTER
----- Message from Tonia Adan sent at 4/22/2024 11:04 AM CDT -----  Regarding: medical advice  Type:  Needs Medical Advice    Who Called: Angelia  Would the patient rather a call back or a response via [facility]? Call back   Best Call Back Number: [phone]  Additional Information: Angelia is requesting a call back regarding her blood work. She wanted to ensure we are checking her vitamin D,

## 2024-04-23 ENCOUNTER — LAB VISIT (OUTPATIENT)
Dept: LAB | Facility: HOSPITAL | Age: 69
End: 2024-04-23
Attending: FAMILY MEDICINE
Payer: MEDICARE

## 2024-04-23 DIAGNOSIS — E11.65 TYPE 2 DIABETES MELLITUS WITH HYPERGLYCEMIA, WITH LONG-TERM CURRENT USE OF INSULIN: Chronic | ICD-10-CM

## 2024-04-23 DIAGNOSIS — R79.89 HIGH SERUM VITAMIN D: ICD-10-CM

## 2024-04-23 DIAGNOSIS — E53.8 VITAMIN B12 DEFICIENCY: ICD-10-CM

## 2024-04-23 DIAGNOSIS — Z00.00 ENCOUNTER FOR WELLNESS EXAMINATION: ICD-10-CM

## 2024-04-23 DIAGNOSIS — E11.65 TYPE 2 DIABETES MELLITUS WITH HYPERGLYCEMIA, WITHOUT LONG-TERM CURRENT USE OF INSULIN: ICD-10-CM

## 2024-04-23 DIAGNOSIS — E78.2 MIXED HYPERLIPIDEMIA: ICD-10-CM

## 2024-04-23 DIAGNOSIS — Z79.4 TYPE 2 DIABETES MELLITUS WITH HYPERGLYCEMIA, WITH LONG-TERM CURRENT USE OF INSULIN: Chronic | ICD-10-CM

## 2024-04-23 LAB
ALBUMIN SERPL-MCNC: 3.7 G/DL (ref 3.4–4.8)
ALBUMIN/GLOB SERPL: 0.9 RATIO (ref 1.1–2)
ALP SERPL-CCNC: 52 UNIT/L (ref 40–150)
ALT SERPL-CCNC: 13 UNIT/L (ref 0–55)
AST SERPL-CCNC: 21 UNIT/L (ref 5–34)
BILIRUB SERPL-MCNC: 0.6 MG/DL
BUN SERPL-MCNC: 18.6 MG/DL (ref 9.8–20.1)
CALCIUM SERPL-MCNC: 9.9 MG/DL (ref 8.4–10.2)
CHLORIDE SERPL-SCNC: 104 MMOL/L (ref 98–107)
CHOLEST SERPL-MCNC: 211 MG/DL
CHOLEST/HDLC SERPL: 4 {RATIO} (ref 0–5)
CO2 SERPL-SCNC: 30 MMOL/L (ref 23–31)
CREAT SERPL-MCNC: 0.81 MG/DL (ref 0.55–1.02)
DEPRECATED CALCIDIOL+CALCIFEROL SERPL-MC: 44.4 NG/ML (ref 30–80)
EST. AVERAGE GLUCOSE BLD GHB EST-MCNC: 165.7 MG/DL
GFR SERPLBLD CREATININE-BSD FMLA CKD-EPI: >60 MLS/MIN/1.73/M2
GLOBULIN SER-MCNC: 3.9 GM/DL (ref 2.4–3.5)
GLUCOSE SERPL-MCNC: 111 MG/DL (ref 82–115)
HBA1C MFR BLD: 7.4 %
HDLC SERPL-MCNC: 52 MG/DL (ref 35–60)
LDLC SERPL CALC-MCNC: 142 MG/DL (ref 50–140)
POTASSIUM SERPL-SCNC: 3.7 MMOL/L (ref 3.5–5.1)
PROT SERPL-MCNC: 7.6 GM/DL (ref 5.8–7.6)
SODIUM SERPL-SCNC: 142 MMOL/L (ref 136–145)
TRIGL SERPL-MCNC: 86 MG/DL (ref 37–140)
VIT B12 SERPL-MCNC: 1058 PG/ML (ref 213–816)
VLDLC SERPL CALC-MCNC: 17 MG/DL

## 2024-04-23 PROCEDURE — 80053 COMPREHEN METABOLIC PANEL: CPT

## 2024-04-23 PROCEDURE — 82607 VITAMIN B-12: CPT

## 2024-04-23 PROCEDURE — 80061 LIPID PANEL: CPT

## 2024-04-23 PROCEDURE — 36415 COLL VENOUS BLD VENIPUNCTURE: CPT

## 2024-04-23 PROCEDURE — 82306 VITAMIN D 25 HYDROXY: CPT

## 2024-04-23 PROCEDURE — 83036 HEMOGLOBIN GLYCOSYLATED A1C: CPT

## 2024-04-29 ENCOUNTER — OFFICE VISIT (OUTPATIENT)
Dept: FAMILY MEDICINE | Facility: CLINIC | Age: 69
End: 2024-04-29
Payer: MEDICARE

## 2024-04-29 VITALS
TEMPERATURE: 98 F | RESPIRATION RATE: 18 BRPM | BODY MASS INDEX: 28.03 KG/M2 | HEIGHT: 63 IN | DIASTOLIC BLOOD PRESSURE: 78 MMHG | HEART RATE: 69 BPM | WEIGHT: 158.19 LBS | SYSTOLIC BLOOD PRESSURE: 134 MMHG | OXYGEN SATURATION: 98 %

## 2024-04-29 DIAGNOSIS — I15.2 HYPERTENSION ASSOCIATED WITH DIABETES: Primary | ICD-10-CM

## 2024-04-29 DIAGNOSIS — T46.6X5A MYALGIA DUE TO STATIN: ICD-10-CM

## 2024-04-29 DIAGNOSIS — I10 PRIMARY HYPERTENSION: ICD-10-CM

## 2024-04-29 DIAGNOSIS — E78.5 HYPERLIPIDEMIA ASSOCIATED WITH TYPE 2 DIABETES MELLITUS: ICD-10-CM

## 2024-04-29 DIAGNOSIS — E53.8 VITAMIN B12 DEFICIENCY: ICD-10-CM

## 2024-04-29 DIAGNOSIS — E11.65 TYPE 2 DIABETES MELLITUS WITH HYPERGLYCEMIA, WITHOUT LONG-TERM CURRENT USE OF INSULIN: ICD-10-CM

## 2024-04-29 DIAGNOSIS — F41.1 GAD (GENERALIZED ANXIETY DISORDER): ICD-10-CM

## 2024-04-29 DIAGNOSIS — F51.01 PRIMARY INSOMNIA: Chronic | ICD-10-CM

## 2024-04-29 DIAGNOSIS — M79.10 MYALGIA DUE TO STATIN: ICD-10-CM

## 2024-04-29 DIAGNOSIS — E11.69 HYPERLIPIDEMIA ASSOCIATED WITH TYPE 2 DIABETES MELLITUS: ICD-10-CM

## 2024-04-29 DIAGNOSIS — E11.59 HYPERTENSION ASSOCIATED WITH DIABETES: Primary | ICD-10-CM

## 2024-04-29 PROCEDURE — G2211 COMPLEX E/M VISIT ADD ON: HCPCS | Mod: ,,, | Performed by: FAMILY MEDICINE

## 2024-04-29 PROCEDURE — 99214 OFFICE O/P EST MOD 30 MIN: CPT | Mod: ,,, | Performed by: FAMILY MEDICINE

## 2024-04-29 RX ORDER — ALPRAZOLAM 0.25 MG/1
0.25 TABLET ORAL 3 TIMES DAILY
Qty: 90 TABLET | Refills: 0 | Status: SHIPPED | OUTPATIENT
Start: 2024-04-29

## 2024-04-29 RX ORDER — TRIAMTERENE AND HYDROCHLOROTHIAZIDE 37.5; 25 MG/1; MG/1
1 CAPSULE ORAL DAILY
Qty: 90 CAPSULE | Refills: 3 | Status: SHIPPED | OUTPATIENT
Start: 2024-04-29 | End: 2025-04-29

## 2024-04-29 RX ORDER — VALACYCLOVIR HYDROCHLORIDE 500 MG/1
500 TABLET, FILM COATED ORAL DAILY
COMMUNITY
Start: 2024-04-03

## 2024-04-29 RX ORDER — BISOPROLOL FUMARATE 5 MG/1
TABLET, FILM COATED ORAL
Qty: 90 TABLET | Refills: 3 | Status: SHIPPED | OUTPATIENT
Start: 2024-04-29

## 2024-04-29 RX ORDER — PRAVASTATIN SODIUM 20 MG/1
20 TABLET ORAL DAILY
Qty: 30 TABLET | Refills: 11 | Status: SHIPPED | OUTPATIENT
Start: 2024-04-29 | End: 2025-04-29

## 2024-04-29 RX ORDER — RAMIPRIL 10 MG/1
10 CAPSULE ORAL DAILY
Qty: 90 CAPSULE | Refills: 3 | Status: SHIPPED | OUTPATIENT
Start: 2024-04-29 | End: 2025-04-29

## 2024-04-29 NOTE — PATIENT INSTRUCTIONS
We understand that controlling diabetes can feel overwhelming at times. We are here to offer the tools and support to help you manage your diabetes and meet your health goals. Please reference the meal planning guide below.     Diabetic Meal Planning    About this topic  Healthy eating is an important part of keeping your diabetes in control. A balanced diet along with your diabetic drugs will help you control your blood sugar. The right portions of healthy foods may help keep your sugar within your goal range. It may also help to lower or maintain your weight, manage cholesterol, the amount of fat in your blood, and blood pressure.      Image(s)    What will the results be?  Healthy eating may help you lower your blood sugar and keep it in a safe range. Keeping your blood sugar in a safe range may lower your chances for long term problems from your diabetes. You may be less likely to have damage to your kidneys, heart, eyes, or nerves.    What changes to diet are needed?  Healthy eating means:  Eating a range of foods from all food groups.  Eating the right size portions. Read the nutrition facts on each food you eat.  Eating meals and snacks at the same time each day. Do not skip a meal or snack. Skipping meals may cause your blood sugar to go too low, especially if you are on drugs for your diabetes. Skipping meals can also cause you to eat too much at the next meal.  Talk to your diabetes educator about making a personal meal plan for you. They can also help you eat the foods you like by modifying them to be healthier.    Who should use this diet?  This diet is helpful to people with high blood sugar or diabetes.    What foods are good to eat?  It is important to make a healthy meal. Eat a variety of different foods in the right portion.  Fill half of your plate with non-starchy vegetables. Non-starchy vegetables include: Broccoli, green beans, carrots, greens (shannen, kale, mustard, turnip), onions, tomatoes,  asparagus, beets, cauliflower, celery, and cucumber. Non-starchy vegetables are high in fiber and low in carbohydrates. These will help keep you full for longer without raising your blood sugar.  Fill 1/4 of your plate with carbohydrates. Try to choose whole grain options. Whole-grain products have more fiber which will make you feel full. Carbohydrates include: Bread, rice, pasta, and starchy vegetables. Starchy vegetables include beans, potatoes, acorn squash, butternut squash, corn, and peas.  Fill 1/4 of your plate with protein. Choose low-fat cuts of meat like boneless, skinless chicken breast; pork loin; 90% lean beef; lean and skinless turkey meat; and fresh fish (not fried) such as tuna, salmon, or mackerel.  Add a low-fat or non-fat dairy product like 8 ounces (240 mL) of 1% or skim milk or 6 ounces (180 mL) of low-fat yogurt. Eat the recommended serving. Milk and yogurt have carbohydrates which raise your blood sugar.  Add a small piece of fruit or 1/2 cup (120 grams) of frozen or canned fruit. Choose canned fruits in juice or water. Fruits include apples, bananas, peaches, pears, pineapple, plums, and oranges. Eat the recommended serving. Fruit has carbohydrates which can raise your blood sugar.  Include healthy fats in your meal like olive oil, canola oil, avocado, and nuts.  Other good foods to include in your diet are:  Foods high in fiber. Adding fiber to your meals may help control your blood sugar and cholesterol levels. It may also help with weight loss and prevent belly problems. If you are younger than 50, it is recommended to get 25 to 38 grams per day. If you are older than 50, it is recommended to get 21 to 30 grams per day. Good sources of fiber include:  Nuts and seeds  Beans, peas, and other legumes  Whole-grain products  Fruits  Vegetables  Smart snacks in the right portion. Do not go too long in between meals. Ask your dietitian when you should have a snack in between your meals. Snack on  things like:  Nuts  Vegetables and low-fat dressing  Light popcorn  Low-fat cheese and crackers  1/2 sandwich    What foods should be limited or avoided?  You may need to limit the amount of some foods you eat and how often you eat them. Foods that should be limited include:  High fat or processed foods like:  Cuevas  Sausage  Hot dogs  Whole-fat dairy products  Potato chips  Foods high in sodium like:  Canned soups  Soy sauce and some salad dressings  Cured meats  Salted snack foods like pretzels  Olives  Fats and oils like:  Margarine  Salad dressing  Gravy  Lard or shortening  Foods high in sugar like:  Candy  Cookies  Cake  Ice cream  Table sugar  Soda  Juice drinks  Starches that are not whole grain like:  White rice  French fries  White pasta  White bread  Sugary cereals  Baked goods, pastries, croissants  Beer, wine, and mixed drinks (alcohol). Drink alcohol in moderation (1 drink per day or less for adult women and 2 drinks per day or less for adult men). Drinking alcohol can cause fluctuations in your blood sugar and interfere with how your diabetic drugs work. Talk to your doctor about how you can safely include alcohol into your diet.    What can be done to prevent this health problem?  Some people have a higher chance of developing diabetes than others. This is a life-long problem. You can still lead a normal life. Diabetes can be managed through diet, exercise, and drugs. It is important to have support from your family and friends to help you with your goals.    When do I need to call the doctor?  Blood sugar level is above 240 mg/dL for more than a day  Blood sugar level drops to less than 40 and does not respond to 15 grams of simple carbohydrate, like 4 glucose tablets or 1/2 cup (120 mL) of fruit juice  Trouble breathing  Very sleepy and trouble concentrating  Feeling very thirsty  Needing to urinate (pee) more than normal  Throw up more than once or have many loose stools  You are so sick you  cannot eat or drink  Fever over 101°F (38°C)  Questions about your diet plan  You are not feeling better in 2 to 3 days or you are feeling worse    Helpful tips  Plan ahead. Plan your meals and grocery list before going to the store. This will help you to choose foods that are good for you.  Pack a lunch. Take healthy meals and snacks with you to work.  Keep a food journal to help keep you on track. Take note of foods that keep your blood sugar in goal range. Also note foods and meals that raise or lower your blood sugar. There are apps for your phone that can help with this.  Visit a restaurant's website before eating out. You can see the menu options and nutritional facts. This way, you can see which items best fit into your diet plan. Call ahead if you have questions.    Disclaimer.This generalized information is a limited summary of diagnosis, treatment, and/or medication information. It is not meant to be comprehensive and should be used as a tool to help the user understand and/or assess potential diagnostic and treatment options. It does NOT include all information about conditions, treatments, medications, side effects, or risks that may apply to a specific patient. It is not intended to be medical advice or a substitute for the medical advice, diagnosis, or treatment of a health care provider based on the health care provider's examination and assessment of a patient's specific and unique circumstances. Patients must speak with a health care provider for complete information about their health, medical questions, and treatment options, including any risks or benefits regarding use of medications. This information does not endorse any treatments or medications as safe, effective, or approved for treating a specific patient. [employer] and its affiliates disclaim any warranty or liability relating to this information or the use thereof. The use of this information is governed by the Terms of Use,  available at Terms of Use. ©2022 [employer] and its affiliates and/or licensors. All rights reserved.

## 2024-04-29 NOTE — PROGRESS NOTES
Angelia Dooley  04/29/2024  [id number]    Apolonia Aguilar MD  Patient Care Team:  Apolonia Aguilar MD as PCP - General (Family Medicine)  Pablito Thapa MD as Consulting Physician (Obstetrics and Gynecology)  Nickolas Cooper MD as Consulting Physician (Cardiology)      Chief Complaint:  Chief Complaint   Patient presents with    Follow-up     3 month f/u for DM with lab results       History of Present Illness:    68 y.o. female who presents today for dm f/u with labs. Labs reviewed by me and were discussed with patient. All questions regarding lab results were answered.     Visit today included increased complexity associated with the care of the episodic problem htn, dmii, insomnia, benzo long term use addressed and managing the longitudinal care of the patient due to the serious and/or complex managed problem(s) .        Review of Systems  General: denies f/c, weight loss, night sweats, decreased appetite  Eye: denies blurred vision, changes in vision  Respiratory: denies sob, wheezing, cough  Cardiovascular: denies chest pain, palpitations, edema  Gastrointestinal: denies abdominal pain, n/v, constipation, diarrhea  Integumentary: denies rashes, pruritis    Past Medical History  Past Medical History:   Diagnosis Date    Diabetes mellitus, type 2     Hyperlipidemia     Hypertension     Personal history of colonic polyps 02/16/2022       Medications  Medication List with Changes/Refills   New Medications    EMPAGLIFLOZIN (JARDIANCE) 10 MG TABLET    Take 1 tablet (10 mg total) by mouth once daily.    PRAVASTATIN (PRAVACHOL) 20 MG TABLET    Take 1 tablet (20 mg total) by mouth once daily.   Current Medications    BLOOD SUGAR DIAGNOSTIC STRP    To check BG 1 times daily, to use with insurance preferred meter    CONTOUR NEXT ONE METER MISC    USE TO TEST BLOOD GLUCOSE TWICE DAILY    GLIMEPIRIDE (AMARYL) 4 MG TABLET    Take 4 mg by mouth 2 (two) times daily.    JANUVIA 100 MG TAB    Take  100 mg by mouth every evening.    PIOGLITAZONE (ACTOS) 15 MG TABLET    Take 1 tablet (15 mg total) by mouth once daily.    PREMARIN VAGINAL CREAM    Place 1 g vaginally every 7 days.    VALACYCLOVIR (VALTREX) 500 MG TABLET    Take 500 mg by mouth once daily.    VITAMIN E ACETATE (VITAMIN E ORAL)    Take 400 Units by mouth once daily.   Changed and/or Refilled Medications    Modified Medication Previous Medication    ALPRAZOLAM (XANAX) 0.25 MG TABLET ALPRAZolam (XANAX) 0.25 MG tablet       Take 1 tablet (0.25 mg total) by mouth 3 (three) times daily.    Take 1 tablet (0.25 mg total) by mouth 3 (three) times daily.    BISOPROLOL (ZEBETA) 5 MG TABLET bisoprolol (ZEBETA) 5 MG tablet       See Instructions, TAKE 1 TABLET BY MOUTH EVERY DAY, # 90 tab(s), 3 Refill(s), [id number], 163, cm, Height/Length Dosing, 03/31/22 10:47:00 CDT, 69, kg, Weight Dosing, 03/31/22 10:47:00 CDT    See Instructions, TAKE 1 TABLET BY MOUTH EVERY DAY, # 90 tab(s), 3 Refill(s), Pharm[facility]: [id number], 163, cm, Height/Length Dosing, 03/31/22 10:47:00 CDT, 69, kg, Weight Dosing, 03/31/22 10:47:00 CDT    RAMIPRIL (ALTACE) 10 MG CAPSULE ramipriL (ALTACE) 10 MG capsule       Take 1 capsule (10 mg total) by mouth once daily.    Take 10 mg by mouth once daily.    TRIAMTERENE-HYDROCHLOROTHIAZIDE 37.5-25 MG (DYAZIDE) 37.5-25 MG PER CAPSULE triamterene-hydrochlorothiazide 37.5-25 mg (DYAZIDE) 37.5-25 mg per capsule       Take 1 capsule by mouth once daily.    Take 1 capsule by mouth once daily.   Discontinued Medications    ATORVASTATIN (LIPITOR) 10 MG TABLET    Take 1 tablet (10 mg total) by mouth once daily.       Past Surgical History:   Procedure Laterality Date    CHOLECYSTECTOMY      COLONOSCOPY W/ POLYPECTOMY  02/16/2022    CYST REMOVAL  10/07/2023    scalp    HYSTERECTOMY      LEFT HEART CATHETERIZATION Left 11/10/2023    Procedure: Left heart cath;  Surgeon: Nickolas Cooper MD;  Location: [facility];  Service:  "Cardiology;  Laterality: Left;    MYOMECTOMY         SUBJECTIVE:  Health Maintenance  Health Maintenance Topics with due status: Not Due       Topic Last Completion Date    Colorectal Cancer Screening 02/16/2022    DEXA Scan 08/04/2022    Mammogram 11/30/2023    Diabetes Urine Screening 01/25/2024    Foot Exam 01/29/2024    Lipid Panel 04/23/2024    Hemoglobin A1c 04/23/2024     Health Maintenance Due   Topic Date Due    TETANUS VACCINE  Never done    RSV Vaccine (Age 60+ and Pregnant patients) (1 - 1-dose 60+ series) Never done    COVID-19 Vaccine (10 - 2023-24 season) 09/01/2023    Eye Exam  06/05/2024       Exam:  Vitals:    04/29/24 0810   BP: 134/78   BP Location: Left arm   Patient Position: Sitting   BP Method: Large (Automatic)   Pulse: 69   Resp: 18   Temp: 98.3 °F (36.8 °C)   TempSrc: Oral   SpO2: 98%   Weight: 71.8 kg (158 lb 3.2 oz)   Height: 5' 3" (1.6 m)     Weight: 71.8 kg (158 lb 3.2 oz)   Body mass index is 28.02 kg/m².      Physical Exam  Constitutional: NAD, alert, pleasant  Respiratory: CTAB, no wheezes, rales or rhonchi. No accessory muscle use  Eyes: EOMI  Cardiovascular: RRR, No m/r/g. No JVD. No LE edema  Gastrointestinal: BS+, nontender, nondistended  Integumentary: warm, dry, intact  Psych: AA&Ox3      ICD-10-CM ICD-9-CM   1. Hypertension associated with diabetes  E11.59 250.80    I15.2 401.9   2. Type 2 diabetes mellitus with hyperglycemia, without long-term current use of insulin  E11.65 250.00     790.29   3. Hyperlipidemia associated with type 2 diabetes mellitus  E11.69 250.80    E78.5 272.4   4. Myalgia due to statin  M79.10 729.1    T46.6X5A E942.2   5. Primary insomnia  F51.01 307.42   6. FRANKY (generalized anxiety disorder)  F41.1 300.02   7. Vitamin B12 deficiency  E53.8 266.2   8. Primary hypertension  I10 401.9       1. Hypertension associated with diabetes  Overview:  At goal on current meds. Neg [facility] with Dr. Cooper 11/2023. Asymptomatic.     Continue current Rx meds        2. Type " 2 diabetes mellitus with hyperglycemia, without long-term current use of insulin  Overview:  A1c up from 7.2 to 7.5. Fasting sugars range low 100s to 150's. NO lows. Has not been able to adhere to a diabetic diet lately      Start jardiance 10 mg daily  Rtc 3 mts      Orders:  -     empagliflozin (JARDIANCE) 10 mg tablet; Take 1 tablet (10 mg total) by mouth once daily.  Dispense: 30 tablet; Refill: 11    3. Hyperlipidemia associated with type 2 diabetes mellitus  Overview:  Ldl not at goal. Not on statin.    The 10-year ASCVD risk score (Meryl CRAIN, et al., 2019) is: 26.1%    Values used to calculate the score:      Age: 68 years      Sex: Female      Is Non- : Yes      Diabetic: Yes      Tobacco smoker: No      Systolic Blood Pressure: 134 mmHg      Is BP treated: Yes      HDL Cholesterol: 52 mg/dL      Total Cholesterol: 211 mg/dL    Statin recommended. Risks and benefits of statin discussed with patient. LDL should be less than 100.     Patient tried lipitor and could not tolerate it due to myalgia  Will try a different statin  Rtc 3 mts with labs      Orders:  -     pravastatin (PRAVACHOL) 20 MG tablet; Take 1 tablet (20 mg total) by mouth once daily.  Dispense: 30 tablet; Refill: 11    4. Myalgia due to statin  Overview:  Failed lipitor      5. Primary insomnia  Overview:  Takes xanax 0.25 mg nightly which works well. Failed trazodone and doxepin. Takes xanax sparingly for prn insomnia.     Continue current Rx meds        6. FRANKY (generalized anxiety disorder)  Overview:  Takes xanax prn sparingly, but has been having increased anxiety because she is caring for her ill mother. She is in need of refills.  reviewed.     Risks of xanax including falls, confusion, addiction discussed with patient.     Refill for prn usage    Orders:  -     ALPRAZolam (XANAX) 0.25 MG tablet; Take 1 tablet (0.25 mg total) by mouth 3 (three) times daily.  Dispense: 90 tablet; Refill: 0    7. Vitamin B12  deficiency  Overview:  Taking otc b12 1000 mcg daily. Now b12 is increased    Decrease to 3x per week      8. Primary hypertension  -     bisoprolol (ZEBETA) 5 MG tablet; See Instructions, TAKE 1 TABLET BY MOUTH EVERY DAY, # 90 tab(s), 3 Refill(s), Pharmacy: [id number], 163, cm, Height/Length Dosing, 03/31/22 10:47:00 CDT, 69, kg, Weight Dosing, 03/31/22 10:47:00 CDT  Dispense: 90 tablet; Refill: 3    Other orders  -     ramipriL (ALTACE) 10 MG capsule; Take 1 capsule (10 mg total) by mouth once daily.  Dispense: 90 capsule; Refill: 3  -     triamterene-hydrochlorothiazide 37.5-25 mg (DYAZIDE) 37.5-25 mg per capsule; Take 1 capsule by mouth once daily.  Dispense: 90 capsule; Refill: 3         Follow up: Follow up for 3 mts diabetes with labs.      Care Plan/Goals: Reviewed   Goals    None

## 2024-05-06 ENCOUNTER — CLINICAL SUPPORT (OUTPATIENT)
Dept: DIABETES | Facility: CLINIC | Age: 69
End: 2024-05-06
Payer: MEDICARE

## 2024-05-06 DIAGNOSIS — E11.65 TYPE 2 DIABETES MELLITUS WITH HYPERGLYCEMIA, WITHOUT LONG-TERM CURRENT USE OF INSULIN: ICD-10-CM

## 2024-05-06 PROCEDURE — G0108 DIAB MANAGE TRN  PER INDIV: HCPCS | Mod: PBBFAC,PN | Performed by: DIETITIAN, REGISTERED

## 2024-05-06 PROCEDURE — 99212 OFFICE O/P EST SF 10 MIN: CPT | Mod: PBBFAC,PN | Performed by: DIETITIAN, REGISTERED

## 2024-05-08 NOTE — PROGRESS NOTES
Diabetes Care Specialist Progress Note  Author: Jigna Medina RD  Date: 5/8/2024    Program Intake  Reason for Diabetes Program Visit:: Initial Diabetes Assessment  Current diabetes risk level:: low  Continuous Glucose Monitoring  Patient has CGM: No    Lab Results   Component Value Date    HGBA1C 7.4 (H) 04/23/2024       Clinical    Weight: (P) 71.3 kg (157 lb 3.2 oz)       Body mass index is 27.85 kg/m² (pended).    Patient Health Rating  Compared to other people your age, how would you rate your health?: Good    Problem Review  Reviewed Problem List with Patient: yes  Active comorbidities affecting diabetes self-care.: yes  Comorbidities: Hypertension  Reviewed health maintenance: yes    Clinical Assessment  Current Diabetes Treatment: Oral Medication (Glimepiride 4 mg, Januvia 100 mg, Actos 15 mg and has not started Jardiance 10 mg (unsure if she wants to take another pill))  Have you ever experienced hypoglycemia (low blood sugar)?: no  Have you ever experienced hyperglycemia (high blood sugar)?: no    Medication Information  How do you obtain your medications?: Patient drives  How many days a week do you miss your medications?: Never  Do you sometimes have difficulty refilling your medications?: No  Medication adherence impacting ability to self-manage diabetes?: No    Labs  Do you have regular lab work to monitor your medications?: Yes  Type of Regular Lab Work: A1c  Where do you get your labs drawn[facility]  Lab Compliance Barriers: No    Nutritional Status  Diet: Regular  Meal Plan 24 Hour Recall: Breakfast, Lunch, Dinner  Meal Plan 24 Hour Recall - Breakfast: grits + 1 piece honey wheat toast, chawla and 1 egg (fried or scrambled)  Meal Plan 24 Hour Recall - Lunch: egg sandwich or 1/2 plate lunch with chicken and green beans  Meal Plan 24 Hour Recall - Dinner: smothered chicken, 1/2 cup rice with gravy and cabbage  Change in appetite?: No  Dentation:: Intact  Recent Changes in Weight: No Recent Weight  Change  Current nutritional status an area of need that is impacting patient's ability to self-manage diabetes?: No    Additional Social History    Support  Does anyone support you with your diabetes care?: yes  Who supports you?: self  Who takes you to your medical appointments?: self  Does the current support meet the patient's needs?: Yes  Is Support an area impacting ability to self-manage diabetes?: No    Access to Mass Media & Technology  Does the patient have access to any of the following devices or technologies?: Smart phone  Media or technology needs impacting ability to self-manage diabetes?: No    Cognitive/Behavioral Health  Alert and Oriented: Yes  Difficulty Thinking: No  Requires Prompting: No  Requires assistance for routine expression?: No  Cognitive or behavioral barriers impacting ability to self-manage diabetes?: No         Communication  Language preference: English  Hearing Problems: No  Vision Problems: No  Communication needs impacting ability to self-manage diabetes?: No    Health Literacy  Preferred Learning Method: Face to Face  How often do you need to have someone help you read instructions, pamphlets, or written material from your doctor or pharmacy?: Never  Health literacy needs impacting ability to self-manage diabetes?: No      Diabetes Self-Management Skills Assessment    Diabetes Disease Process/Treatment Options  Patient/caregiver able to state what happens when someone has diabetes.: yes  Patient/caregiver knows what type of diabetes they have.: yes  Diabetes Type : Type II  Diabetes Disease Process/Treatment Options: Skills Assessment Completed: Yes  Assessment indicates:: Instruction Needed  Area of need?: Yes    Nutrition/Healthy Eating  Method of carbohydrate measurement:: eyeballing/guessing, other (see comments) (eating small portions of carbs and including non-starchy vegetables)  Nutrition/Healthy Eating Skills Assessment Completed:: Yes  Assessment indicates::  Instruction Needed  Area of need?: Yes    Physical Activity/Exercise  Patient's daily activity level:: lightly active  Patient formally exercises outside of work.: no  Patient can identify forms of physical activity.: yes  Stated forms of physical activity:: moving to burn calories  Patient can identify reasons why exercise/physical activity is important in diabetes management.: no  Physical Activity/Exercise Skills Assessment Completed: : Yes  Assessment indicates:: Instruction Needed  Area of need?: Yes    Medications  Patient is able to describe current diabetes management routine.: yes  Diabetes management routine:: oral medications  Patient is able to identify current diabetes medications, dosages, and appropriate timing of medications.: yes  Patient understands the purpose of the medications taken for diabetes.: no  Patient reports problems or concerns with current medication regimen.: yes  Medication regimen problems/concerns:: concerned about side effects  Medication Skills Assessment Completed:: Yes  Assessment indicates:: Instruction Needed  Area of need?: Yes    Home Blood Glucose Monitoring  Patient states that blood sugar is checked at home daily.: no  Reasons for not monitoring:: finger pain  Home Blood Glucose Monitoring Skills Assessment Completed: : Yes  Assessment indicates:: Instruction Needed  Area of need?: Yes    Acute Complications  Acute Complications Skills Assessment Completed: : No  Deffered due to:: Time    Chronic Complications  Chronic Complications Skills Assessment Completed: : No  Deferred due to:: Time    Psychosocial/Coping  Psychosocial/Coping Skills Assessment Completed: : No  Deffered due to:: Time      Assessment Summary and Plan    Based on today's diabetes care assessment, the following areas of need were identified:          5/6/2024    12:01 AM   Social   Support No   Access to Mass Media/Tech No   Cognitive/Behavioral Health No   Communication No   Health Literacy No             5/6/2024    12:01 AM   Clinical   Medication Adherence No   Lab Compliance No   Nutritional Status No            5/6/2024    12:01 AM   Diabetes Self-Management Skills   Diabetes Disease Process/Treatment Options Yes - Reviewed diabetes pathophysiology, different types of diabetes, signs and symptoms, risk factors and treatment guidelines.    Nutrition/Healthy Eating Yes - Reviewed the sources and role of Carbohydrate, Protein, and Fat and how each nutrient impact blood sugar. Reviewed label reading for Total Carbohydrates and how sugars impact total carbohydrates. Reviewed serving sizes of starches, milk, fruit, starchy vegetables and snacks. Reviewed non-starchy vegetable list. Provided meal-planning instruction via foodlists, plate method and measuring cups. Provided sample menus and snack ideas. Pt states drinking water and zero calorie drinks   Physical Activity/Exercise Yes - see care plan. Pt states she is not engaging in regular exercise due to taking care of her mother 2-3 times per day in nursing home. She does have exercise equipment (stationary bike and portable treadmill) which she can use while watching tv.   Medication Yes - Discussed MOA, onset, side effects, dosage of meds. She is hesitant to start Jardiance due to potential side effects of yeast infections. Encouraged to discuss with MD and reviewed benefits.   Home Blood Glucose Monitoring Yes - see care plan.          Today's interventions were provided through individual discussion, instruction, and written materials were provided.      Patient verbalized understanding of instruction and written materials.  Pt was able to return back demonstration of instructions today. Patient understood key points, needs reinforcement and further instruction.     Diabetes Self-Management Care Plan:    Today's Diabetes Self-Management Care Plan was developed with Angelia's input. Angelia has agreed to work toward the following goal(s) to improve his/her  overall diabetes control.      Care Plan: Diabetes Management   Updates made since 4/8/2024 12:00 AM        Problem: Physical Activity and Exercise         Goal: Patient agrees to increase physical activity to a goal of 5 times per week for 30 minutes.    Start Date: 5/8/2024   Expected End Date: 7/8/2024   Priority: Medium   Barriers: No Barriers Identified        Task: Discussed role of physical activity on reducing insulin resistance and improvement in overall glycemic control. Completed 5/8/2024        Task: Offered suggestions on how patient could increase their regular physical activity Completed 5/8/2024        Problem: Blood Glucose Self-Monitoring         Goal: Patient agrees to check and record blood sugars 1 times per day.    Start Date: 5/8/2024   Expected End Date: 7/8/2024   Priority: High   Barriers: No Barriers Identified        Task: Reviewed the importance of self-monitoring blood glucose and keeping logs. Completed 5/8/2024        Task: Provided patient with blood glucose logs, reviewed appropriate timing and frequency to SMBG, education on parameters on when to notify provider and advised patient to bring logs to all appts with PCP/Endocrinologist/Diabetes Care Specialist. Completed 5/8/2024        Task: Discussed ways to minimize pain when monitoring blood glucose. Completed 5/8/2024          Follow Up Plan     Follow up in about 4 weeks (around 6/3/2024) for activity, medication, glucose log review, Psychosocial/Coping, chronic complications.    Today's care plan and follow up schedule was discussed with patient.  Angelia verbalized understanding of the care plan, goals, and agrees to follow up plan.        The patient was encouraged to communicate with his/her health care provider/physician and care team regarding his/her condition(s) and treatment.  I provided the patient with my contact information today and encouraged to contact me via phone or [facility] Patient Portal as needed.     Length  of Visit   Total Time: 60 Minutes

## 2024-05-21 ENCOUNTER — TELEPHONE (OUTPATIENT)
Dept: FAMILY MEDICINE | Facility: CLINIC | Age: 69
End: 2024-05-21
Payer: MEDICARE

## 2024-05-21 NOTE — TELEPHONE ENCOUNTER
Spoke to pt. She states that she is scared to take the Jardiance due to what she has read, yeast infections, dehydration, etc. She states that she also read where Pravachol can increase the fasting glucose levels. Her fasting blood sugars in the morning have been 150, 160,170. She states she knows these numbers are not where they are suppose to be but she in not sure what else to do or what else would be recommended. She also mentioned that she is feeling fine and that she had another lipid panel done by cardiology and was advised that her numbers are good. Please advise.

## 2024-05-21 NOTE — TELEPHONE ENCOUNTER
----- Message from [facility] sent at 5/21/2024  3:11 PM CDT -----  .Type:  Needs Medical Advice    Who Called:  pt    Symptoms (please be specific):  no  How long has patient had these symptoms:   no    Pharmacy name and phone #:   no    Would the patient rather a call back or a response via [facility]?      Best Call Back Number:  [phone]    Additional Information:  pt states she has this medication empagliflozin (JARDIANCE) 10 mg tablet  it has too many side effects and she has 2 other medications for her glucose she would like advice from the nurse thanks

## 2024-05-22 NOTE — TELEPHONE ENCOUNTER
I recommend jardiance as it is a great medication and those sugars are too high. Any statin can slightly raise sugars but this is more concerning for people without diabetes. She has diabetes and needs a statin and better control of her numbers. Side effects are possible with any medication but we will not know if she has any unless she tries the medication.     I prescribed this over one month ago meaning her A1c will not improve.     If she has further concerhns, she can come in to discuss other options

## 2024-05-23 ENCOUNTER — TELEPHONE (OUTPATIENT)
Dept: FAMILY MEDICINE | Facility: CLINIC | Age: 69
End: 2024-05-23
Payer: MEDICARE

## 2024-05-23 NOTE — TELEPHONE ENCOUNTER
Pt notified of the recommendations. She states that she still in not comfortable with taking that many diabetes medications so she would like to schedule an appt. I transferred the call to Mary to get pt scheduled

## 2024-05-30 ENCOUNTER — LAB VISIT (OUTPATIENT)
Dept: LAB | Facility: HOSPITAL | Age: 69
End: 2024-05-30
Attending: OBSTETRICS & GYNECOLOGY
Payer: MEDICARE

## 2024-05-30 DIAGNOSIS — R30.0 DYSURIA: Primary | ICD-10-CM

## 2024-05-30 LAB
BACTERIA #/AREA URNS AUTO: ABNORMAL /HPF
BILIRUB UR QL STRIP.AUTO: NEGATIVE
CLARITY UR: CLEAR
COLOR UR AUTO: COLORLESS
GLUCOSE UR QL STRIP: NORMAL
HGB UR QL STRIP: NEGATIVE
KETONES UR QL STRIP: NEGATIVE
LEUKOCYTE ESTERASE UR QL STRIP: 25
NITRITE UR QL STRIP: NEGATIVE
PH UR STRIP: 6.5 [PH]
PROT UR QL STRIP: NEGATIVE
RBC #/AREA URNS AUTO: ABNORMAL /HPF
SP GR UR STRIP.AUTO: 1.01 (ref 1–1.03)
SQUAMOUS #/AREA URNS LPF: ABNORMAL /HPF
UROBILINOGEN UR STRIP-ACNC: NORMAL
WBC #/AREA URNS AUTO: ABNORMAL /HPF

## 2024-05-30 PROCEDURE — 87086 URINE CULTURE/COLONY COUNT: CPT

## 2024-05-30 PROCEDURE — 87077 CULTURE AEROBIC IDENTIFY: CPT

## 2024-05-30 PROCEDURE — 81001 URINALYSIS AUTO W/SCOPE: CPT

## 2024-06-01 LAB — BACTERIA UR CULT: ABNORMAL

## 2024-06-12 LAB
LEFT EYE DM RETINOPATHY: NEGATIVE
RIGHT EYE DM RETINOPATHY: NEGATIVE

## 2024-06-24 ENCOUNTER — TELEPHONE (OUTPATIENT)
Dept: FAMILY MEDICINE | Facility: CLINIC | Age: 69
End: 2024-06-24
Payer: MEDICARE

## 2024-06-24 RX ORDER — PIOGLITAZONEHYDROCHLORIDE 30 MG/1
30 TABLET ORAL DAILY
Qty: 90 TABLET | Refills: 3 | Status: SHIPPED | OUTPATIENT
Start: 2024-06-24 | End: 2025-06-24

## 2024-06-24 NOTE — TELEPHONE ENCOUNTER
Spoke to pt. She states that her blood sugar this morning was 195 fasting. She states that all she ate for supper last night was watermelon. She states that she checked her blood sugar just a little while ago fasting and it was 121. She called because she got scared. She states that her blood sugars have been on the high end in the mornings; 150's, 160's, and 170's. She states that she does get lower readings at time but the reading are more on the higher end. When going over her meds, she states that she is taking Januvia, Actos, and Glimepiride only. She is not taking the Jardiance that was last prescribed. She states that she is to scared of the side effects. At this time in the phone call, she started crying stating that she does not want Dr Morgan to be mad at her but she is petrified to take the Jardiance. Her follow up appt is scheduled on 07/29/2024. I advised her that I would send a message to see if Dr Morgan wants to make any changes prior to her upcoming appt. Verbal understanding given. Please advise.

## 2024-06-24 NOTE — TELEPHONE ENCOUNTER
Watermelon is extremely high in sugar so that is why it was 195. Fruits lower in sugar are all berries.     Patient could have called me to let me know that she is scared to take the jardiance. Jardiance is a great medication, but there are possible side effects.     I sent in a higher dose of actos for her to start taking. It may cause her legs to swell.

## 2024-06-25 NOTE — TELEPHONE ENCOUNTER
Pt notified of the recommendations. Verbal understanding given. She states that next time if she decides not to take something prescribed that she will let the office know

## 2024-07-08 ENCOUNTER — TELEPHONE (OUTPATIENT)
Dept: FAMILY MEDICINE | Facility: CLINIC | Age: 69
End: 2024-07-08
Payer: MEDICARE

## 2024-07-08 NOTE — TELEPHONE ENCOUNTER
----- Message from Sandy Alfonso sent at 7/8/2024 10:09 AM CDT -----  .Type:  RX Refill Request    Who Called: pt  Refill or New Rx:refill  RX Name and Strength:bisoprolol (ZEBETA) 5 MG tablet  How is the patient currently taking it? (ex. 1XDay):1/day  Is this a 30 day or 90 day RX:90  Preferred Pharmacy with phone number:[facility] [address]  Local or Mail Order:Local  Ordering Provider:Olga  Would the patient rather a call back or a response via [facility]?   Best Call Back Number:[phone]   Additional Information: bisoprolol (ZEBETA) 5 MG tablet

## 2024-07-08 NOTE — TELEPHONE ENCOUNTER
Notified pt that she should have refills on the Bisoprolol as a script was escribed on 04/29/2024 for #90 with 3 refills. Pt states that she did not realize this and that she will call the pharamcy

## 2024-07-23 ENCOUNTER — LAB VISIT (OUTPATIENT)
Dept: LAB | Facility: HOSPITAL | Age: 69
End: 2024-07-23
Attending: FAMILY MEDICINE
Payer: MEDICARE

## 2024-07-23 DIAGNOSIS — E11.69 HYPERLIPIDEMIA ASSOCIATED WITH TYPE 2 DIABETES MELLITUS: ICD-10-CM

## 2024-07-23 DIAGNOSIS — E78.5 HYPERLIPIDEMIA ASSOCIATED WITH TYPE 2 DIABETES MELLITUS: ICD-10-CM

## 2024-07-23 DIAGNOSIS — E11.65 TYPE 2 DIABETES MELLITUS WITH HYPERGLYCEMIA, WITHOUT LONG-TERM CURRENT USE OF INSULIN: ICD-10-CM

## 2024-07-23 LAB
ALBUMIN SERPL-MCNC: 3.8 G/DL (ref 3.4–4.8)
ALBUMIN/GLOB SERPL: 1.1 RATIO (ref 1.1–2)
ALP SERPL-CCNC: 51 UNIT/L (ref 40–150)
ALT SERPL-CCNC: 14 UNIT/L (ref 0–55)
ANION GAP SERPL CALC-SCNC: 7 MEQ/L
AST SERPL-CCNC: 21 UNIT/L (ref 5–34)
BILIRUB SERPL-MCNC: 0.6 MG/DL
BUN SERPL-MCNC: 20.7 MG/DL (ref 9.8–20.1)
CALCIUM SERPL-MCNC: 10.2 MG/DL (ref 8.4–10.2)
CHLORIDE SERPL-SCNC: 102 MMOL/L (ref 98–107)
CHOLEST SERPL-MCNC: 173 MG/DL
CHOLEST/HDLC SERPL: 4 {RATIO} (ref 0–5)
CO2 SERPL-SCNC: 31 MMOL/L (ref 23–31)
CREAT SERPL-MCNC: 0.92 MG/DL (ref 0.55–1.02)
CREAT/UREA NIT SERPL: 23
EST. AVERAGE GLUCOSE BLD GHB EST-MCNC: 159.9 MG/DL
GFR SERPLBLD CREATININE-BSD FMLA CKD-EPI: >60 ML/MIN/1.73/M2
GLOBULIN SER-MCNC: 3.6 GM/DL (ref 2.4–3.5)
GLUCOSE SERPL-MCNC: 142 MG/DL (ref 82–115)
HBA1C MFR BLD: 7.2 %
HDLC SERPL-MCNC: 49 MG/DL (ref 35–60)
LDLC SERPL CALC-MCNC: 104 MG/DL (ref 50–140)
POTASSIUM SERPL-SCNC: 3.8 MMOL/L (ref 3.5–5.1)
PROT SERPL-MCNC: 7.4 GM/DL (ref 5.8–7.6)
SODIUM SERPL-SCNC: 140 MMOL/L (ref 136–145)
TRIGL SERPL-MCNC: 102 MG/DL (ref 37–140)
VLDLC SERPL CALC-MCNC: 20 MG/DL

## 2024-07-23 PROCEDURE — 80053 COMPREHEN METABOLIC PANEL: CPT

## 2024-07-23 PROCEDURE — 80061 LIPID PANEL: CPT

## 2024-07-23 PROCEDURE — 36415 COLL VENOUS BLD VENIPUNCTURE: CPT

## 2024-07-23 PROCEDURE — 83036 HEMOGLOBIN GLYCOSYLATED A1C: CPT

## 2024-07-27 ENCOUNTER — OFFICE VISIT (OUTPATIENT)
Dept: URGENT CARE | Facility: CLINIC | Age: 69
End: 2024-07-27
Payer: MEDICARE

## 2024-07-27 VITALS
WEIGHT: 156 LBS | OXYGEN SATURATION: 99 % | RESPIRATION RATE: 18 BRPM | HEART RATE: 82 BPM | TEMPERATURE: 99 F | DIASTOLIC BLOOD PRESSURE: 75 MMHG | HEIGHT: 63 IN | SYSTOLIC BLOOD PRESSURE: 146 MMHG | BODY MASS INDEX: 27.64 KG/M2

## 2024-07-27 DIAGNOSIS — R50.9 FEVER, UNSPECIFIED FEVER CAUSE: Primary | ICD-10-CM

## 2024-07-27 DIAGNOSIS — J06.9 VIRAL URI WITH COUGH: ICD-10-CM

## 2024-07-27 LAB
CTP QC/QA: YES
CTP QC/QA: YES
POC MOLECULAR INFLUENZA A AGN: NEGATIVE
POC MOLECULAR INFLUENZA B AGN: NEGATIVE
SARS-COV-2 AG RESP QL IA.RAPID: NEGATIVE

## 2024-07-27 PROCEDURE — 99213 OFFICE O/P EST LOW 20 MIN: CPT | Mod: ,,, | Performed by: FAMILY MEDICINE

## 2024-07-27 PROCEDURE — 87502 INFLUENZA DNA AMP PROBE: CPT | Mod: QW,,, | Performed by: FAMILY MEDICINE

## 2024-07-27 PROCEDURE — 87811 SARS-COV-2 COVID19 W/OPTIC: CPT | Mod: QW,,, | Performed by: FAMILY MEDICINE

## 2024-07-27 NOTE — PATIENT INSTRUCTIONS
Drink plenty of water  Use good handwashing techniques with soap and water as you are potentially contagious  You may alternate ibuprofen and tylenol every 3 hours for fevers, headaches, sore throat, body aches, etc...  Use over the counter medications for symptom relief such as delsym for cough, tylenol sinus, etc...  Take coricidin HBP if you have high blood pressure  Chloraseptic spray and lozenges for sore throat  Return to clinic if no improvement within 48-72 hours

## 2024-07-27 NOTE — PROGRESS NOTES
Angelia Dooley  07/27/2024  [id number]    Apolonia Aguilar MD  Patient Care Team:  Apolonia Aguilar MD as PCP - General (Family Medicine)  Pablito Thapa MD as Consulting Physician (Obstetrics and Gynecology)  Nickolas Cooper MD as Consulting Physician (Cardiology)      Chief Complaint:  Chief Complaint   Patient presents with    Fever     Chills, fever(102), headache, body aches, dry cough, scratchy throat, x 2 days, worse since yesterday, home COVID test negative       History of Present Illness:    68 y.o. female who presents today c/o 3 days of fever, headache, chills and mild dry cough. NO sob or wheezing.     Review of Systems  General: denies weight loss, night sweats, decreased appetite  Eye: denies blurred vision, changes in vision  Cardiovascular: denies chest pain, palpitations, edema  Gastrointestinal: denies abdominal pain, n/v, constipation, diarrhea  Integumentary: denies rashes, pruritis    Past Medical History  Past Medical History:   Diagnosis Date    Diabetes mellitus, type 2     Hyperlipidemia     Hypertension     Personal history of colonic polyps 02/16/2022       Medications  Medication List with Changes/Refills   Current Medications    ALPRAZOLAM (XANAX) 0.25 MG TABLET    Take 1 tablet (0.25 mg total) by mouth 3 (three) times daily.    BISOPROLOL (ZEBETA) 5 MG TABLET    See Instructions, TAKE 1 TABLET BY MOUTH EVERY DAY, # 90 tab(s), 3 Refill(s), Pharmacy: [facility]/pharmacy #[id number], 163, cm, Height/Length Dosing, 03/31/22 10:47:00 CDT, 69, kg, Weight Dosing, 03/31/22 10:47:00 CDT    BLOOD SUGAR DIAGNOSTIC STRP    To check BG 1 times daily, to use with insurance preferred meter    CONTOUR NEXT ONE METER MISC    USE TO TEST BLOOD GLUCOSE TWICE DAILY    EMPAGLIFLOZIN (JARDIANCE) 10 MG TABLET    Take 1 tablet (10 mg total) by mouth once daily.    GLIMEPIRIDE (AMARYL) 4 MG TABLET    Take 4 mg by mouth 2 (two) times daily.    JANUVIA 100 MG TAB    Take 100 mg by mouth every  "evening.    PIOGLITAZONE (ACTOS) 30 MG TABLET    Take 1 tablet (30 mg total) by mouth once daily.    PRAVASTATIN (PRAVACHOL) 20 MG TABLET    Take 1 tablet (20 mg total) by mouth once daily.    PREMARIN VAGINAL CREAM    Place 1 g vaginally every 7 days.    RAMIPRIL (ALTACE) 10 MG CAPSULE    Take 1 capsule (10 mg total) by mouth once daily.    TRIAMTERENE-HYDROCHLOROTHIAZIDE 37.5-25 MG (DYAZIDE) 37.5-25 MG PER CAPSULE    Take 1 capsule by mouth once daily.    VALACYCLOVIR (VALTREX) 500 MG TABLET    Take 500 mg by mouth once daily.    VITAMIN E ACETATE (VITAMIN E ORAL)    Take 400 Units by mouth once daily.       Past Surgical History:   Procedure Laterality Date    CHOLECYSTECTOMY      COLONOSCOPY W/ POLYPECTOMY  02/16/2022    CYST REMOVAL  10/07/2023    scalp    HYSTERECTOMY      LEFT HEART CATHETERIZATION Left 11/10/2023    Procedure: Left heart cath;  Surgeon: Nickolas Cooper MD;  Location: [facility] CATH LAB;  Service: Cardiology;  Laterality: Left;    MYOMECTOMY         SUBJECTIVE:  Health Maintenance  Health Maintenance Topics with due status: Not Due       Topic Last Completion Date    Colorectal Cancer Screening 02/16/2022    DEXA Scan 08/04/2022    Influenza Vaccine 09/07/2023    Mammogram 11/30/2023    Diabetes Urine Screening 01/25/2024    Foot Exam 01/29/2024    Eye Exam 06/12/2024    Lipid Panel 07/23/2024    Hemoglobin A1c 07/23/2024     Health Maintenance Due   Topic Date Due    TETANUS VACCINE  Never done    RSV Vaccine (Age 60+ and Pregnant patients) (1 - 1-dose 60+ series) Never done    COVID-19 Vaccine (10 - 2023-24 season) 09/01/2023       Exam:  Vitals:    07/27/24 0810 07/27/24 0817   BP: (!) 151/83 (!) 146/75   Pulse: 82    Resp: 18    Temp: 99.3 °F (37.4 °C)    TempSrc: Oral    SpO2: 99%    Weight: 70.8 kg (156 lb)    Height: 5' 3" (1.6 m)      Weight: 70.8 kg (156 lb)   Body mass index is 27.63 kg/m².      Physical Exam  Gen: alert, oriented. Pleasant  Eyes: no drainage, conjunctivitis  Ears: BL " TM's + light reflex, no external canal edema/drainage.  Oropharynx: no tonsillar erythema or exudate. Moist mucous membranes  Nose: normal nasal inspection. No septal deviation  CV: RRR  Resp: CTAB  Skin: warm, dry, intact  Lymphadenopathy: no cervical, submandibular, postauricular lymphadenopathy        ICD-10-CM ICD-9-CM   1. Fever, unspecified fever cause  R50.9 780.60   2. Viral URI with cough  J06.9 465.9       1. Fever, unspecified fever cause  -     SARS Coronavirus 2 Antigen, POCT Manual Read  -     POCT Influenza A/B Molecular    2. Viral URI with cough     Flu and covid neg  Drink plenty of water  Use good handwashing techniques with soap and water as you are potentially contagious  You may alternate ibuprofen and tylenol every 3 hours for fevers, headaches, sore throat, body aches, etc...  Use over the counter medications for symptom relief such as delsym for cough, tylenol sinus, etc...  Take coricidin HBP if you have high blood pressure  Chloraseptic spray and lozenges for sore throat  Return to clinic if no improvement within 48-72 hours     Follow up: No follow-ups on file.      Care Plan/Goals: Reviewed   Goals    None         Rapid covid neg

## 2024-07-27 NOTE — PROGRESS NOTES
Subjective:      Patient ID: Angelia Dooley is a 68 y.o. female.    Vitals:  vitals were not taken for this visit.     Chief Complaint: Fever        Fever     Constitution: Positive for fever.    Objective:     Physical Exam    Assessment:     No diagnosis found.    Plan:       There are no diagnoses linked to this encounter.

## 2024-07-29 ENCOUNTER — TELEPHONE (OUTPATIENT)
Dept: FAMILY MEDICINE | Facility: CLINIC | Age: 69
End: 2024-07-29

## 2024-07-29 ENCOUNTER — PATIENT OUTREACH (OUTPATIENT)
Facility: CLINIC | Age: 69
End: 2024-07-29
Payer: MEDICARE

## 2024-07-29 ENCOUNTER — OFFICE VISIT (OUTPATIENT)
Dept: FAMILY MEDICINE | Facility: CLINIC | Age: 69
End: 2024-07-29
Payer: MEDICARE

## 2024-07-29 VITALS
WEIGHT: 155.69 LBS | DIASTOLIC BLOOD PRESSURE: 66 MMHG | RESPIRATION RATE: 18 BRPM | OXYGEN SATURATION: 97 % | HEART RATE: 64 BPM | HEIGHT: 63 IN | TEMPERATURE: 98 F | BODY MASS INDEX: 27.59 KG/M2 | SYSTOLIC BLOOD PRESSURE: 107 MMHG

## 2024-07-29 DIAGNOSIS — Z12.31 ENCOUNTER FOR SCREENING MAMMOGRAM FOR BREAST CANCER: ICD-10-CM

## 2024-07-29 DIAGNOSIS — R06.2 WHEEZING: ICD-10-CM

## 2024-07-29 DIAGNOSIS — E78.5 HYPERLIPIDEMIA ASSOCIATED WITH TYPE 2 DIABETES MELLITUS: ICD-10-CM

## 2024-07-29 DIAGNOSIS — J06.9 VIRAL URI WITH COUGH: ICD-10-CM

## 2024-07-29 DIAGNOSIS — E11.69 HYPERLIPIDEMIA ASSOCIATED WITH TYPE 2 DIABETES MELLITUS: ICD-10-CM

## 2024-07-29 DIAGNOSIS — E11.65 TYPE 2 DIABETES MELLITUS WITH HYPERGLYCEMIA, WITHOUT LONG-TERM CURRENT USE OF INSULIN: Primary | ICD-10-CM

## 2024-07-29 DIAGNOSIS — T46.6X5A MYALGIA DUE TO STATIN: ICD-10-CM

## 2024-07-29 DIAGNOSIS — E11.59 HYPERTENSION ASSOCIATED WITH DIABETES: ICD-10-CM

## 2024-07-29 DIAGNOSIS — I15.2 HYPERTENSION ASSOCIATED WITH DIABETES: ICD-10-CM

## 2024-07-29 DIAGNOSIS — M79.10 MYALGIA DUE TO STATIN: ICD-10-CM

## 2024-07-29 PROCEDURE — 99214 OFFICE O/P EST MOD 30 MIN: CPT | Mod: ,,, | Performed by: FAMILY MEDICINE

## 2024-07-29 PROCEDURE — G2211 COMPLEX E/M VISIT ADD ON: HCPCS | Mod: ,,, | Performed by: FAMILY MEDICINE

## 2024-07-29 RX ORDER — ALBUTEROL SULFATE 90 UG/1
2 AEROSOL, METERED RESPIRATORY (INHALATION) EVERY 4 HOURS PRN
Qty: 13.4 EACH | Refills: 0 | Status: SHIPPED | OUTPATIENT
Start: 2024-07-29 | End: 2024-08-28

## 2024-07-29 RX ORDER — AZELASTINE HYDROCHLORIDE, FLUTICASONE PROPIONATE 137; 50 UG/1; UG/1
1 SPRAY, METERED NASAL 2 TIMES DAILY
Qty: 23 G | Refills: 11 | Status: SHIPPED | OUTPATIENT
Start: 2024-07-29 | End: 2024-08-28

## 2024-07-29 NOTE — PROGRESS NOTES
Records Received, hyper-linked into chart at this time. The following record(s)  below were uploaded for Health Maintenance .            6/12/2024  DM EYE EXAM

## 2024-07-29 NOTE — LETTER
AUTHORIZATION FOR RELEASE OF CONFIDENTIAL INFORMATION      2024      Dear Misa Person,    We are seeing Angelia Dooley, date of birth 1955, in the clinic at [facility].   Apolonia Aguilar MD is the patient's PCP. Angelia Dooley has an outstanding lab/procedure at the time we reviewed his chart.  In order to help keep her health information updated, Angelia has authorized us to request the following medical record(s):            Eye Exam - including evaluation for diabetic retinopathy        Please fax any records to Apolonia Aguilar MD's at  [phone]    If you have any questions, please contact  Leonardo LUJAN,CCC @ [phone]             Patient Name: Angelia Dooley    : 1955    Patient Phone #: [phone]                Angelia Dooley  MRN: [id number]  : 1955  Age: 66 y.o.  Sex: female         Patient/Legal Guardian Signature  This signature was collected at 2022    Angelia Dooley     Self  _______________________________   Printed Name/Relationship to Patient      Consent for Examination and Treatment: I hereby authorize the providers and employees of [facility] ([facility]) to provide medical treatment/services which includes, but is not limited to, performing and administering tests and diagnostic procedures that are deemed necessary, including, but not limited to, imaging examinations, blood tests and other laboratory procedures as may be required by the hospital, clinic, or may be ordered by my physician(s) or persons working under the general and/or special instructions of my physician(s).      I understand and agree that this consent covers all authorized persons, including but not limited to physicians, residents, nurse practitioners, physicians' assistants, specialists, consultants, student nurses, and independently contracted physicians, who are called upon by the physician in charge, to carry  out the diagnostic procedures and medical or surgical treatment.     I hereby authorize [facility] to retain or dispose of any specimens or tissue, should there be such remaining from any test or procedure.     I hereby authorize and give consent for [facility] providers and employees to take photographs, images or videotapes of such diagnostic, surgical or treatment procedures of Patient as may be required by [facility] or as may be ordered by a physician. I further acknowledge and agree that [facility] may use cameras or other devices for patient monitoring.     I am aware that the practice of medicine is not an exact science, and I acknowledge that no guarantees have been made to me as to the outcome of any tests, procedures or treatment.     Authorization for Release of Information: I understand that my insurance company and/or their agents may need information necessary to make determinations about payment/reimbursement. I hereby provide authorization to release to all insurance companies, their successors, assignees, other parties with whom they may have contracted, or others acting on their behalf, that are involved with payment for any hospital and/or clinic charges incurred by the patient, any information that they request and deem necessary for payment/reimbursement, and/or quality review.  I further authorize the release of my health information to physicians or other health care practitioners on staff who are involved in my health care now and in the future, and to other health care providers, entities, or institutions for the purpose of my continued care and treatment, including referrals.     REGISTRATION AUTHORIZATION  Form No. 67238 (Rev. 7/13/2022)       Medicare Patient's Certification and Authorization to Release Information and Payment Request:  I certify that the information given by me in applying for payment under Title XVIII of the Social Security Act is correct. I authorize any patino of medical or  other information about me to release to the [facility], or its intermediaries or carriers, any information needed for this or a related Medicare claim. I request that payment of authorized benefits be made on my behalf.     Assignment of Insurance Benefits:   I hereby authorize any and all insurance companies, health plans, defined   benefit plans, health insurers or any entity that is or may be responsible for payment of my medical expenses to pay all hospital and medical benefits now due, and to become due and payable to me under any hospital benefits, sick benefits, injury benefits or any other benefit for services rendered to me, including Major Medical Benefits, direct to [facility] and all independently contracted physicians. I assign any and all rights that I may have against any and all insurance companies, health plans, defined benefit plans, health insurers or any entity that is or may be responsible for payment of my medical expenses, including, but not limited to any right to appeal a denial of a claim, any right to bring any action, lawsuit, administrative proceeding, or other cause of action on my behalf. I specifically assign my right to pursue litigation against any and all insurance companies, health plans, defined benefit plans, health insurers or any entity that is or may be responsible for payment of my medical expenses based upon a refusal to pay charges.            E. Valuables: It is understood and agreed that [facility] is not liable for the damage to or loss of any money, jewelry,   documents, dentures, eye glasses, hearing aids, prosthetics, or other property of value.     F. Computer Equipment: I understand and agree that should I choose to use computer equipment owned by [facility] or if I choose to access the Internet via [facility]s network, I do so at my own risk. [facility] is not responsible for any damage to my computer equipment or to any damages of any type that might arise  from my loss of equipment or data.     G. Acceptance of Financial Responsibility:  I agree that in consideration of the services and   supplies that have been   or will be furnished to the patient, I am hereby obligated to pay all charges made for or on the account of the patient according to the standard rates (in effect at the time the services and supplies are delivered) established by [facility], including its Patient Financial Assistance Policy to the extent it is applicable. I understand that I am responsible for all charges, or portions thereof, not covered by insurance or other sources. Patient refunds will be distributed only after balances at all [facility] facilities are paid.     H. Communication Authorization:  I hereby authorize [facility] and its representatives, along with any billing service   or  who may work on their behalf, to contact me on   my cell phone and/or home phone using pre- recorded messages, artificial voice messages, automatic telephone dialing devices or other computer assisted technology, or by electronic        mail, text messaging, or by any other form of electronic communication. This includes, but is not limited to, appointment reminders, yearly physical exam reminders, preventive care reminders, patient campaigns, welcome calls, and calls about account balances on my account or any account on which I am listed as a guarantor. I understand I have the right to opt out of these communications at any time.      Relationship  Between  Facility and  Provider:      I understand that some, but not all, providers furnishing services to the patient are not employees or agents of [facility]. The patient is under the care and supervision of his/her attending physician, and it is the responsibility of the facility and its nursing staff to carry out the instructions of such physicians. It is the responsibility of the patient's physician/designee to obtain the patient's informed consent,  when required, for medical or surgical treatment, special diagnostic or therapeutic procedures, or hospital services rendered for the patient under the special instructions of the physician/designee.     REGISTRATION AUTHORIZATION  Form No. 93398 (Rev. 7/13/2022)      Notice of Privacy Practices: I acknowledge I have received a copy of [facility]'s Notice of Privacy Practices.     Facility  Directory: I have discussed with the organization my desire to be either included or excluded  in the facility directory in the event of my being an inpatient at an [facility] facility. I understand that if my choice is to opt-out of being identified in the facility directory that the facility will not provide any information about me such as my condition (e.g. fair, stable, etc.) or my location in the facility (e.g., room number, department).     TERM: This authorization is valid for this and subsequent care/treatment I receive at [facility] and will remain valid unless/until revoked in writing by me.     [facility]: As used in this document, [facility] means all [facility] owned and managed facilities, including, but not limited to, all health centers, surgery centers, clinics, urgent care centers, and hospitals.         [facility] complies with applicable Federal civil rights laws and does not discriminate on the basis of race, color, national origin, age, disability, or sex.  ATENCIÓN: si bienvenidola geovanna, tiene a goodson disposición servicios gratuitos de asistencia lingüística. Llame al [phone].  [facility] Ý: N?u b?n nói Ti?ng Vi?t, có các d?ch v? h? tr? ngôn ng? mi?n phí dành cho b?n. G?i s? [phone].        REGISTRATION AUTHORIZATION  Form No. 85017 (Rev. 7/13/2022)

## 2024-07-29 NOTE — PROGRESS NOTES
Angelia Dooley  07/29/2024  [id number]    Apolonia Aguilar MD  Patient Care Team:  Apolonia Aguilar MD as PCP - General (Family Medicine)  Pablito Thapa MD as Consulting Physician (Obstetrics and Gynecology)  Nickolas Cooper MD as Consulting Physician (Cardiology)      Chief Complaint:  Chief Complaint   Patient presents with    Follow-up     Diabetes Follow Up       History of Present Illness:    68 y.o. female who presents today for diabetes f/u with labs.     Labs reviewed by me and were discussed with patient. All questions regarding lab results were answered.     She was diagnosed with a viral uri 2 days ago. Fever stopped yesterday. She has noticed some inspiratory wheezes but no shortness of breath. Cough is productive of clear sputum and she is experiencing some hoarseness. Requesting a refill of dymista.     Visit today included increased complexity associated with the care of the episodic problem htn, dmii, hld addressed and managing the longitudinal care of the patient due to the serious and/or complex managed problem(s) .      Review of Systems  General: denies f/c, weight loss, night sweats, decreased appetite  Eye: denies blurred vision, changes in vision  Respiratory: denies sob, wheezing, cough  Cardiovascular: denies chest pain, palpitations, edema  Gastrointestinal: denies abdominal pain, n/v, constipation, diarrhea  Integumentary: denies rashes, pruritis    Past Medical History  Past Medical History:   Diagnosis Date    Diabetes mellitus, type 2     Hyperlipidemia     Hypertension     Personal history of colonic polyps 02/16/2022       Medications  Medication List with Changes/Refills   New Medications    ALBUTEROL (VENTOLIN HFA) 90 MCG/ACTUATION INHALER    Inhale 2 puffs into the lungs every 4 (four) hours as needed for Wheezing. Rescue    AZELASTINE-FLUTICASONE (DYMISTA) 137-50 MCG/SPRAY SPRY NASSAL SPRAY    1 spray by Each Nostril route 2 (two) times daily.    Current Medications    ALPRAZOLAM (XANAX) 0.25 MG TABLET    Take 1 tablet (0.25 mg total) by mouth 3 (three) times daily.    BISOPROLOL (ZEBETA) 5 MG TABLET    See Instructions, TAKE 1 TABLET BY MOUTH EVERY DAY, # 90 tab(s), 3 Refill(s), Pharmacy: [facility]/pharmacy #[id number], 163, cm, Height/Length Dosing, 03/31/22 10:47:00 CDT, 69, kg, Weight Dosing, 03/31/22 10:47:00 CDT    BLOOD SUGAR DIAGNOSTIC STRP    To check BG 1 times daily, to use with insurance preferred meter    CONTOUR NEXT ONE METER MISC    USE TO TEST BLOOD GLUCOSE TWICE DAILY    GLIMEPIRIDE (AMARYL) 4 MG TABLET    Take 4 mg by mouth 2 (two) times daily.    JANUVIA 100 MG TAB    Take 100 mg by mouth every evening.    PIOGLITAZONE (ACTOS) 30 MG TABLET    Take 1 tablet (30 mg total) by mouth once daily.    PRAVASTATIN (PRAVACHOL) 20 MG TABLET    Take 1 tablet (20 mg total) by mouth once daily.    RAMIPRIL (ALTACE) 10 MG CAPSULE    Take 1 capsule (10 mg total) by mouth once daily.    TRIAMTERENE-HYDROCHLOROTHIAZIDE 37.5-25 MG (DYAZIDE) 37.5-25 MG PER CAPSULE    Take 1 capsule by mouth once daily.    VALACYCLOVIR (VALTREX) 500 MG TABLET    Take 500 mg by mouth once daily.    VITAMIN E ACETATE (VITAMIN E ORAL)    Take 400 Units by mouth once daily.   Discontinued Medications    EMPAGLIFLOZIN (JARDIANCE) 10 MG TABLET    Take 1 tablet (10 mg total) by mouth once daily.    PREMARIN VAGINAL CREAM    Place 1 g vaginally every 7 days.       Past Surgical History:   Procedure Laterality Date    CHOLECYSTECTOMY      COLONOSCOPY W/ POLYPECTOMY  02/16/2022    CYST REMOVAL  10/07/2023    scalp    HYSTERECTOMY      LEFT HEART CATHETERIZATION Left 11/10/2023    Procedure: Left heart cath;  Surgeon: Nickolas Cooper MD;  Location: [facility] CATH LAB;  Service: Cardiology;  Laterality: Left;    MYOMECTOMY         SUBJECTIVE:  Health Maintenance  Health Maintenance Topics with due status: Not Due       Topic Last Completion Date    Colorectal Cancer Screening 02/16/2022    DEXA Scan  "08/04/2022    Influenza Vaccine 09/07/2023    Mammogram 11/30/2023    Diabetes Urine Screening 01/25/2024    Foot Exam 01/29/2024    Eye Exam 06/12/2024    Lipid Panel 07/23/2024    Hemoglobin A1c 07/23/2024     Health Maintenance Due   Topic Date Due    TETANUS VACCINE  Never done    RSV Vaccine (Age 60+ and Pregnant patients) (1 - 1-dose 60+ series) Never done    COVID-19 Vaccine (10 - 2023-24 season) 09/01/2023       Exam:  Vitals:    07/29/24 1320   BP: 107/66   BP Location: Right arm   Patient Position: Sitting   BP Method: Small (Automatic)   Pulse: 64   Resp: 18   Temp: 97.6 °F (36.4 °C)   SpO2: 97%   Weight: 70.6 kg (155 lb 11.2 oz)   Height: 5' 3" (1.6 m)     Weight: 70.6 kg (155 lb 11.2 oz)   Body mass index is 27.58 kg/m².      Physical Exam  Constitutional: NAD, alert, pleasant  Respiratory: On rul inspiratory wheeze. The rest of her lungs are cta. No accessory muscle use  Eyes: EOMI  Cardiovascular: RRR, holosystolic murmur. No JVD. No LE edema  Integumentary: warm, dry, intact  Psych: AA&Ox3      ICD-10-CM ICD-9-CM   1. Type 2 diabetes mellitus with hyperglycemia, without long-term current use of insulin  E11.65 250.00     790.29   2. Hypertension associated with diabetes  E11.59 250.80    I15.2 401.9   3. Hyperlipidemia associated with type 2 diabetes mellitus  E11.69 250.80    E78.5 272.4   4. Myalgia due to statin  M79.10 729.1    T46.6X5A E942.2   5. Viral URI with cough  J06.9 465.9   6. Encounter for screening mammogram for breast cancer  Z12.31 V76.12   7. Wheezing  R06.2 786.07       1. Type 2 diabetes mellitus with hyperglycemia, without long-term current use of insulin  Overview:  A1c stable at 7.2. On Januvia 100 mg, actos 30 mg, glimepiride 4 mg bid. Fasting sugars range low 100s to 150's. NO lows. Has not been able to adhere to a diabetic diet lately    Continue current Rx meds  Rtc 3 mts with labs      Orders:  -     Hemoglobin A1C; Future; Expected date: 10/29/2024  -     Basic Metabolic " Panel; Future; Expected date: 10/29/2024  -     Microalbumin/Creatinine Ratio, Urine; Future; Expected date: 10/29/2024    2. Hypertension associated with diabetes  Overview:  At goal on ramipril 10 mg, bisoprolol 5 mg, dyazide.  Neg C with Dr. Cooper 11/2023. Asymptomatic.     Continue current Rx meds        3. Hyperlipidemia associated with type 2 diabetes mellitus  Overview:  Ldl improved to 106 with the addition of pravastatin 20 mg.     The 10-year ASCVD risk score (Meryl CRAIN, et al., 2019) is: 26.4%    Values used to calculate the score:      Age: 68 years      Sex: Female      Is Non- : Yes      Diabetic: Yes      Tobacco smoker: No      Systolic Blood Pressure: 146 mmHg      Is BP treated: Yes      HDL Cholesterol: 49 mg/dL      Total Cholesterol: 173 mg/dL      Statin recommended. Risks and benefits of statin discussed with patient. LDL should be less than 100.     Patient tried lipitor and could not tolerate it due to myalgia  Continue pravastatin 20 mg        4. Myalgia due to statin  Overview:  Failed lipitor      5. Viral URI with cough  -     azelastine-fluticasone (DYMISTA) 137-50 mcg/spray Spry nassal spray; 1 spray by Each Nostril route 2 (two) times daily.  Dispense: 23 g; Refill: 11  -     albuterol (VENTOLIN HFA) 90 mcg/actuation inhaler; Inhale 2 puffs into the lungs every 4 (four) hours as needed for Wheezing. Rescue  Dispense: 13.4 each; Refill: 0    6. Encounter for screening mammogram for breast cancer  -     Mammo Digital Screening Bilat w/ Neil; Future; Expected date: 11/29/2024    7. Wheezing  -     albuterol (VENTOLIN HFA) 90 mcg/actuation inhaler; Inhale 2 puffs into the lungs every 4 (four) hours as needed for Wheezing. Rescue  Dispense: 13.4 each; Refill: 0       Dymista refilled  Try plain mucinex to break up throat sputum  Use albuterol prn wheezing  Call me with any  new fevers, shortness of breath or worsening cough    Follow up: No follow-ups on  file.      Care Plan/Goals: Reviewed   Goals    None

## 2024-08-01 ENCOUNTER — TELEPHONE (OUTPATIENT)
Dept: FAMILY MEDICINE | Facility: CLINIC | Age: 69
End: 2024-08-01
Payer: MEDICARE

## 2024-08-01 RX ORDER — PROMETHAZINE HYDROCHLORIDE AND DEXTROMETHORPHAN HYDROBROMIDE 6.25; 15 MG/5ML; MG/5ML
5 SYRUP ORAL EVERY 4 HOURS PRN
Qty: 118 ML | Refills: 0 | Status: SHIPPED | OUTPATIENT
Start: 2024-08-01 | End: 2024-08-06

## 2024-08-01 RX ORDER — PREDNISONE 20 MG/1
20 TABLET ORAL DAILY
Qty: 3 TABLET | Refills: 0 | Status: SHIPPED | OUTPATIENT
Start: 2024-08-01 | End: 2024-08-04

## 2024-08-01 NOTE — TELEPHONE ENCOUNTER
Cough can linger for over 6 wks. I want her to use the inhaler if she is wheezing or coughing  often.     I have sent in a cough syrup and a few days of prednisone.     If she starts running fever, call back.

## 2024-08-01 NOTE — TELEPHONE ENCOUNTER
----- Message from Beata Muro sent at 8/1/2024  8:20 AM CDT -----  Who Called: Angelia M Cowans [place]    Caller is requesting assistance/information from provider's office.    Symptoms (please be specific):    How long has patient had these symptoms:    List of preferred pharmacies on file (remove unneeded): [unfilled]  If different, enter pharmacy into here including location and phone number: [facility]/PHARMACY #[id number]  [place] - [address] AT [facility]          Patient's Preferred Phone Number on File: [phone]   Best Call Back Number, if different:  Additional Information: pt has had a cough for a while, causing wheezing,   and nothing is helping,  asked medication is prescribed  (saw provider on Monday), please follow up

## 2024-08-15 ENCOUNTER — LAB VISIT (OUTPATIENT)
Dept: LAB | Facility: HOSPITAL | Age: 69
End: 2024-08-15
Attending: FAMILY MEDICINE
Payer: MEDICARE

## 2024-08-15 DIAGNOSIS — D89.2 HYPERGAMMAGLOBULINEMIA: ICD-10-CM

## 2024-08-15 DIAGNOSIS — R71.8 OTHER ABNORMALITY OF RED BLOOD CELLS: ICD-10-CM

## 2024-08-15 DIAGNOSIS — D70.8 OTHER NEUTROPENIA: ICD-10-CM

## 2024-08-15 LAB
ALBUMIN SERPL-MCNC: 3.8 G/DL (ref 3.4–4.8)
ALBUMIN/GLOB SERPL: 1 RATIO (ref 1.1–2)
ALP SERPL-CCNC: 56 UNIT/L (ref 40–150)
ALT SERPL-CCNC: 14 UNIT/L (ref 0–55)
ANION GAP SERPL CALC-SCNC: 11 MEQ/L
AST SERPL-CCNC: 20 UNIT/L (ref 5–34)
BASOPHILS # BLD AUTO: 0.03 X10(3)/MCL
BASOPHILS NFR BLD AUTO: 0.6 %
BILIRUB SERPL-MCNC: 0.4 MG/DL
BUN SERPL-MCNC: 25.2 MG/DL (ref 9.8–20.1)
CALCIUM SERPL-MCNC: 10.3 MG/DL (ref 8.4–10.2)
CHLORIDE SERPL-SCNC: 104 MMOL/L (ref 98–107)
CO2 SERPL-SCNC: 26 MMOL/L (ref 23–31)
CREAT SERPL-MCNC: 1.17 MG/DL (ref 0.55–1.02)
CREAT/UREA NIT SERPL: 22
EOSINOPHIL # BLD AUTO: 0.16 X10(3)/MCL (ref 0–0.9)
EOSINOPHIL NFR BLD AUTO: 3.3 %
ERYTHROCYTE [DISTWIDTH] IN BLOOD BY AUTOMATED COUNT: 14.3 % (ref 11.5–17)
GFR SERPLBLD CREATININE-BSD FMLA CKD-EPI: 51 ML/MIN/1.73/M2
GLOBULIN SER-MCNC: 3.9 GM/DL (ref 2.4–3.5)
GLUCOSE SERPL-MCNC: 191 MG/DL (ref 82–115)
HCT VFR BLD AUTO: 33.6 % (ref 37–47)
HGB BLD-MCNC: 11.4 G/DL (ref 12–16)
IGA SERPL-MCNC: 189 MG/DL (ref 69–517)
IGG SERPL-MCNC: 2003 MG/DL (ref 522–1631)
IGM SERPL-MCNC: 98 MG/DL (ref 33–293)
IMM GRANULOCYTES # BLD AUTO: 0.01 X10(3)/MCL (ref 0–0.04)
IMM GRANULOCYTES NFR BLD AUTO: 0.2 %
LYMPHOCYTES # BLD AUTO: 1.71 X10(3)/MCL (ref 0.6–4.6)
LYMPHOCYTES NFR BLD AUTO: 35.1 %
MCH RBC QN AUTO: 30.6 PG (ref 27–31)
MCHC RBC AUTO-ENTMCNC: 33.9 G/DL (ref 33–36)
MCV RBC AUTO: 90.3 FL (ref 80–94)
MONOCYTES # BLD AUTO: 0.66 X10(3)/MCL (ref 0.1–1.3)
MONOCYTES NFR BLD AUTO: 13.6 %
NEUTROPHILS # BLD AUTO: 2.3 X10(3)/MCL (ref 2.1–9.2)
NEUTROPHILS NFR BLD AUTO: 47.2 %
PLATELET # BLD AUTO: 258 X10(3)/MCL (ref 130–400)
PMV BLD AUTO: 9.7 FL (ref 7.4–10.4)
POTASSIUM SERPL-SCNC: 3.7 MMOL/L (ref 3.5–5.1)
PROT SERPL-MCNC: 7.7 GM/DL (ref 5.8–7.6)
RBC # BLD AUTO: 3.72 X10(6)/MCL (ref 4.2–5.4)
SODIUM SERPL-SCNC: 141 MMOL/L (ref 136–145)
WBC # BLD AUTO: 4.87 X10(3)/MCL (ref 4.5–11.5)

## 2024-08-15 PROCEDURE — 80053 COMPREHEN METABOLIC PANEL: CPT

## 2024-08-15 PROCEDURE — 85025 COMPLETE CBC W/AUTO DIFF WBC: CPT

## 2024-08-15 PROCEDURE — 83521 IG LIGHT CHAINS FREE EACH: CPT

## 2024-08-15 PROCEDURE — 84165 PROTEIN E-PHORESIS SERUM: CPT

## 2024-08-15 PROCEDURE — 36415 COLL VENOUS BLD VENIPUNCTURE: CPT

## 2024-08-15 PROCEDURE — 86334 IMMUNOFIX E-PHORESIS SERUM: CPT

## 2024-08-15 PROCEDURE — 82784 ASSAY IGA/IGD/IGG/IGM EACH: CPT | Mod: 59

## 2024-08-16 LAB
ALBUMIN % SPEP (OHS): 45.09 (ref 48.1–59.5)
ALBUMIN SERPL-MCNC: 3.4 G/DL (ref 3.4–4.8)
ALBUMIN/GLOB SERPL: 0.8 RATIO (ref 1.1–2)
ALPHA 1 GLOB (OHS): 0.27 GM/DL (ref 0–0.4)
ALPHA 1 GLOB% (OHS): 3.57 (ref 2.3–4.9)
ALPHA 2 GLOB % (OHS): 10.06 (ref 6.9–13)
ALPHA 2 GLOB (OHS): 0.76 GM/DL (ref 0.4–1)
BETA GLOB (OHS): 1.2 GM/DL (ref 0.7–1.3)
BETA GLOB% (OHS): 15.75 (ref 13.8–19.7)
GAMMA GLOBULIN % (OHS): 25.53 (ref 10.1–21.9)
GAMMA GLOBULIN (OHS): 1.94 GM/DL (ref 0.4–1.8)
GLOBULIN SER-MCNC: 4.2 GM/DL (ref 2.4–3.5)
M SPIKE % (OHS): ABNORMAL
M SPIKE (OHS): ABNORMAL
PATH REV: NORMAL
PROT SERPL-MCNC: 7.6 GM/DL (ref 5.8–7.6)

## 2024-08-19 LAB
KAPPA LC FREE SER NEPH-MCNC: 2.07 MG/DL (ref 0.33–1.94)
KAPPA LC FREE/LAMBDA FREE SER NEPH: 1.95 {RATIO} (ref 0.26–1.65)
LAMBDA LC FREE SER NEPH-MCNC: 1.06 MG/DL (ref 0.57–2.63)

## 2024-08-25 DIAGNOSIS — R06.2 WHEEZING: ICD-10-CM

## 2024-08-25 DIAGNOSIS — J06.9 VIRAL URI WITH COUGH: ICD-10-CM

## 2024-08-26 RX ORDER — ALBUTEROL SULFATE 90 UG/1
2 INHALANT RESPIRATORY (INHALATION) EVERY 4 HOURS PRN
Qty: 8.5 G | Refills: 3 | Status: SHIPPED | OUTPATIENT
Start: 2024-08-26 | End: 2024-09-25

## 2024-08-28 DIAGNOSIS — E11.69 HYPERLIPIDEMIA ASSOCIATED WITH TYPE 2 DIABETES MELLITUS: ICD-10-CM

## 2024-08-28 DIAGNOSIS — E78.5 HYPERLIPIDEMIA ASSOCIATED WITH TYPE 2 DIABETES MELLITUS: ICD-10-CM

## 2024-08-28 RX ORDER — PRAVASTATIN SODIUM 20 MG/1
20 TABLET ORAL DAILY
Qty: 90 TABLET | Refills: 3 | Status: SHIPPED | OUTPATIENT
Start: 2024-08-28

## 2024-08-28 NOTE — TELEPHONE ENCOUNTER
----- Message from Kae Drummond sent at 8/28/2024 12:51 PM CDT -----  Regarding: medication change  Who Called: Angelia Dooley    Refill or New Rx:Refill  RX Name and Strength:pravastatin (PRAVACHOL) 20 MG tablet 30 tablet 11 4/29/2024 4/29/2025 No  Sig - Route: Take 1 tablet (20 mg total) by mouth once daily. - Oral      How is the patient currently taking it? (ex. 1XDay):    Is this a 30 day or 90 day RX:90 day    Local or Mail Order:local    List of preferred pharmacies on file (remove unneeded): [facility]/pharmacy #[id number] - [place] - [address] [place] [address] AT [facility]   Phone: [phone]  Fax: [phone]        Ordering Provider:Dr. Morgan      Preferred Method of Contact: Phone Call  Patient's Preferred Phone Number on File: [phone]   Best Call Back Number, if different:  Additional Information: pt is requesting that the prescription listeed above be changed to a 90 day supply; please advise.

## 2024-09-03 ENCOUNTER — OFFICE VISIT (OUTPATIENT)
Dept: HEMATOLOGY/ONCOLOGY | Facility: CLINIC | Age: 69
End: 2024-09-03
Payer: MEDICARE

## 2024-09-03 VITALS
HEART RATE: 68 BPM | RESPIRATION RATE: 20 BRPM | BODY MASS INDEX: 27.7 KG/M2 | OXYGEN SATURATION: 98 % | TEMPERATURE: 98 F | WEIGHT: 156.31 LBS | SYSTOLIC BLOOD PRESSURE: 111 MMHG | DIASTOLIC BLOOD PRESSURE: 61 MMHG | HEIGHT: 63 IN

## 2024-09-03 DIAGNOSIS — D89.2 HYPERGAMMAGLOBULINEMIA: Primary | ICD-10-CM

## 2024-09-03 PROCEDURE — 99213 OFFICE O/P EST LOW 20 MIN: CPT | Mod: S$PBB,,, | Performed by: NURSE PRACTITIONER

## 2024-09-03 PROCEDURE — 99214 OFFICE O/P EST MOD 30 MIN: CPT | Mod: PBBFAC | Performed by: NURSE PRACTITIONER

## 2024-09-03 PROCEDURE — 99999 PR PBB SHADOW E&M-EST. PATIENT-LVL IV: CPT | Mod: PBBFAC,,, | Performed by: NURSE PRACTITIONER

## 2024-09-03 NOTE — PROGRESS NOTES
HEMATOLOGY/ONCOLOGY OFFICE CLINIC VISIT    Visit Information:    Initial Evaluation:  02/08/2024  Referring Provider: Dr Cathy Espinoza  Other providers:  Code status:  Not addressed    Diagnosis:  Gammopathy  leukopenia         CLINICAL HISTORY:       Patient: Angelia Dooley is a 68 y.o. female with history of diabetes, hypertension, hyperlipidemia kindly referred for gammopathy.  Reviewing electronic medical record platelet counts were completely normal back in 2015 and 2017.  Total protein was 8.4 with a globulin of 4.6 back in 11/12/2015 it essentially normalized by 04/06/2022 with normal total protein and mildly elevated globulin 3.9.  Kidney function was normal and remains normal.    Patient is here today by herself.  She voices no concerns.  She is nervous and anxious but otherwise doing well.  She does not have any family history of multiple myeloma, lymphoma, leukemia or any blood disorders.    Chief Complaint: 6 Month Follow Up      Interval History:    Patient presents today for 6 month f/u. She had [facility] labs drawn on 8/15/24. SPEP with normal immunofixation. Slightly elevated at 1.95. At the time of her labs, she was recovering from a viral infection. Reports cough x 6 weeks. Her CBC and CMP with abnormalities that could be contributed to viral infection. She will be seeing PCP with labs again next month.      No fever, chills, sweats.  No chest pain or shortness of breath.  No changes in bowel habits.    Past Medical History:   Diagnosis Date    Diabetes mellitus, type 2     Hyperlipidemia     Hypertension     Personal history of colonic polyps 02/16/2022      Past Surgical History:   Procedure Laterality Date    CHOLECYSTECTOMY      COLONOSCOPY W/ POLYPECTOMY  02/16/2022    CYST REMOVAL  10/07/2023    scalp    HYSTERECTOMY      LEFT HEART CATHETERIZATION Left 11/10/2023    Procedure: Left heart cath;  Surgeon: Nickolas Cooper MD;  Location: [facility] CATH LAB;  Service: Cardiology;  Laterality:  Left;    MYOMECTOMY       Family History   Problem Relation Name Age of Onset    Diabetes type I Mother Roxanne Brady     Heart failure Mother Roxanne Brady     Arthritis Mother Roxanne Brady     Depression Mother Roxanne Brady     Diabetes Mother Roxanne Brady     Hypertension Mother Roxanne Brady     Kidney disease Mother Roxanne Brady     Vision loss Mother Roxanne Brady     Heart failure Father Kwan Dominguez     Hypertension Father Kwan Dominguez     Heart disease Father Kwan Dominguez             Review of patient's allergies indicates:   Allergen Reactions    Acetaminophen-codeine     Bactrim [sulfamethoxazole-trimethoprim]     Cyclobenzaprine     Hydrocodone-acetaminophen     Tramadol     Versed [midazolam] Nausea Only      Current Outpatient Medications on File Prior to Visit   Medication Sig Dispense Refill    albuterol (PROVENTIL/VENTOLIN HFA) 90 mcg/actuation inhaler INHALE 2 PUFFS INTO THE LUNGS EVERY 4 (FOUR) HOURS AS NEEDED FOR WHEEZING. RESCUE 8.5 g 3    ALPRAZolam (XANAX) 0.25 MG tablet Take 1 tablet (0.25 mg total) by mouth 3 (three) times daily. 90 tablet 0    bisoprolol (ZEBETA) 5 MG tablet See Instructions, TAKE 1 TABLET BY MOUTH EVERY DAY, # 90 tab(s), 3 Refill(s), Pharmacy: [facility]/pharmacy #[id number], 163, cm, Height/Length Dosing, 03/31/22 10:47:00 CDT, 69, kg, Weight Dosing, 03/31/22 10:47:00 CDT 90 tablet 3    blood sugar diagnostic Strp To check BG 1 times daily, to use with insurance preferred meter 100 strip 3    CONTOUR NEXT ONE METER Misc USE TO TEST BLOOD GLUCOSE TWICE DAILY      glimepiride (AMARYL) 4 MG tablet Take 4 mg by mouth 2 (two) times daily.      JANUVIA 100 mg Tab Take 100 mg by mouth every evening.      pioglitazone (ACTOS) 30 MG tablet Take 1 tablet (30 mg total) by mouth once daily. 90 tablet 3    pravastatin (PRAVACHOL) 20 MG tablet Take 1 tablet (20 mg total) by mouth once daily. 90 tablet 3    ramipriL (ALTACE) 10 MG capsule Take 1 capsule (10 mg total) by mouth once daily. 90 capsule 3     "triamterene-hydrochlorothiazide 37.5-25 mg (DYAZIDE) 37.5-25 mg per capsule Take 1 capsule by mouth once daily. 90 capsule 3    valACYclovir (VALTREX) 500 MG tablet Take 500 mg by mouth once daily.      vitamin E acetate (VITAMIN E ORAL) Take 400 Units by mouth once daily.       Current Facility-Administered Medications on File Prior to Visit   Medication Dose Route Frequency Provider Last Rate Last Admin    diphenhydrAMINE capsule 50 mg  50 mg Oral On Call Procedure Nickolas Cooper MD   50 mg at 11/10/23 0812      Review of Systems   Constitutional:  Negative for activity change, appetite change, chills, fatigue, fever and unexpected weight change.   HENT:  Negative for mouth dryness, mouth sores, nosebleeds, sore throat and trouble swallowing.    Eyes:  Negative for visual disturbance.   Respiratory:  Negative for cough and shortness of breath.    Cardiovascular:  Negative for chest pain, palpitations and leg swelling.   Gastrointestinal:  Negative for abdominal distention, abdominal pain, blood in stool, change in bowel habit, constipation, diarrhea, nausea and vomiting.   Endocrine: Negative.    Genitourinary:  Negative for dysuria, frequency, hematuria and urgency.   Musculoskeletal:  Negative for arthralgias, back pain, myalgias and neck pain.   Integumentary:  Negative for rash.   Neurological:  Negative for dizziness, tremors, syncope, speech difficulty, weakness, light-headedness, numbness, headaches and memory loss.   Hematological:  Does not bruise/bleed easily.   Psychiatric/Behavioral:  Negative for confusion and suicidal ideas. The patient is nervous/anxious.               Vitals:    09/03/24 1500   BP: 111/61   BP Location: Left arm   Patient Position: Sitting   Pulse: 68   Resp: 20   Temp: 98.4 °F (36.9 °C)   TempSrc: Oral   SpO2: 98%   Weight: 70.9 kg (156 lb 4.8 oz)   Height: 5' 3" (1.6 m)        Wt Readings from Last 6 Encounters:   09/03/24 70.9 kg (156 lb 4.8 oz)   07/29/24 70.6 kg (155 lb " 11.2 oz)   07/27/24 70.8 kg (156 lb)   05/06/24 (P) 71.3 kg (157 lb 3.2 oz)   04/29/24 71.8 kg (158 lb 3.2 oz)   02/29/24 71.7 kg (158 lb 1.6 oz)     Body mass index is 27.69 kg/m².  Body surface area is 1.78 meters squared.  Physical Exam  Vitals and nursing note reviewed.   Constitutional:       General: She is not in acute distress.     Appearance: Normal appearance. She is well-developed.   HENT:      Head: Normocephalic and atraumatic.      Mouth/Throat:      Mouth: Mucous membranes are moist.   Eyes:      General: No scleral icterus.     Extraocular Movements: Extraocular movements intact.      Conjunctiva/sclera: Conjunctivae normal.      Pupils: Pupils are equal, round, and reactive to light.   Neck:      Vascular: No JVD.   Cardiovascular:      Rate and Rhythm: Normal rate and regular rhythm.      Heart sounds: No murmur heard.  Pulmonary:      Effort: Pulmonary effort is normal.      Breath sounds: Normal breath sounds. No wheezing or rhonchi.   Abdominal:      General: Bowel sounds are normal. There is no distension.      Palpations: Abdomen is soft. There is no mass.      Tenderness: There is no abdominal tenderness.   Musculoskeletal:         General: No swelling or deformity.      Cervical back: Neck supple.   Lymphadenopathy:      Head:      Right side of head: No submandibular adenopathy.      Left side of head: No submandibular adenopathy.      Cervical: No cervical adenopathy.      Upper Body:      Right upper body: No supraclavicular or axillary adenopathy.      Left upper body: No supraclavicular or axillary adenopathy.      Lower Body: No right inguinal adenopathy. No left inguinal adenopathy.   Skin:     General: Skin is warm.      Coloration: Skin is not jaundiced.      Findings: No lesion or rash.      Nails: There is no clubbing.   Neurological:      General: No focal deficit present.      Mental Status: She is alert and oriented to person, place, and time.      Cranial Nerves: Cranial  nerves 2-12 are intact.   Psychiatric:         Attention and Perception: Attention normal.         Mood and Affect: Mood is anxious.         Behavior: Behavior is cooperative.         Judgment: Judgment normal.       Laboratory:  CBC with Differential:  Lab Results   Component Value Date    WBC 4.87 08/15/2024    RBC 3.72 (L) 08/15/2024    HGB 11.4 (L) 08/15/2024    HCT 33.6 (L) 08/15/2024    MCV 90.3 08/15/2024    MCH 30.6 08/15/2024    MCHC 33.9 08/15/2024    RDW 14.3 08/15/2024     08/15/2024    MPV 9.7 08/15/2024        CMP:  Sodium   Date Value Ref Range Status   08/15/2024 141 136 - 145 mmol/L Final     Potassium   Date Value Ref Range Status   08/15/2024 3.7 3.5 - 5.1 mmol/L Final     Chloride   Date Value Ref Range Status   08/15/2024 104 98 - 107 mmol/L Final     CO2   Date Value Ref Range Status   08/15/2024 26 23 - 31 mmol/L Final     Blood Urea Nitrogen   Date Value Ref Range Status   08/15/2024 25.2 (H) 9.8 - 20.1 mg/dL Final     Creatinine   Date Value Ref Range Status   08/15/2024 1.17 (H) 0.55 - 1.02 mg/dL Final     Calcium   Date Value Ref Range Status   08/15/2024 10.3 (H) 8.4 - 10.2 mg/dL Final     Albumin   Date Value Ref Range Status   08/15/2024 3.8 3.4 - 4.8 g/dL Final   08/15/2024 3.4 3.4 - 4.8 g/dL Final   01/30/2024 3.4 g/dL Final     Bilirubin Total   Date Value Ref Range Status   08/15/2024 0.4 <=1.5 mg/dL Final     ALP   Date Value Ref Range Status   08/15/2024 56 40 - 150 unit/L Final     AST   Date Value Ref Range Status   08/15/2024 20 5 - 34 unit/L Final     ALT   Date Value Ref Range Status   08/15/2024 14 0 - 55 unit/L Final     Estimated GFR-Non    Date Value Ref Range Status   04/06/2022 >60        Latest Reference Range & Units 01/30/24 09:18   Alpha 1 Glob 0.00 - 0.40 gm/dl 0.27   Alpha 2 Glob 0.40 - 1.00 gm/dl 0.84   Beta Glob 0.70 - 1.30 gm/dl 1.55 (H)   Gamma Globulin 0.40 - 1.80 gm/dl 2.14 (H)   Pathology Review  SPEP: No M-spike indicative of a  monoclonal protein is identified.  DEVIN:  Immunofixation shows a normal pattern of immunoglobulins.  No monoclonal bands are detected. Venu Hui M.D.    Kappa/Lambda FLC Ratio 0.2600 - 1.65  1.77 (H)   % Albumin 48.1 - 59.5  41.54 (L)   % Alpha-1 2.3 - 4.9  3.25   % Alpha-2 6.9 - 13  10.21   Beta Glob % 13.8 - 19.7  18.90   Gamma Globulin % 10.1 - 21.9  26.10 (H)   [place] Free Light Chain 0.3300 - 1.94 mg/dL 1.97 (H)   Lambda Free Light Chain 0.5700 - 2.63 mg/dL 1.11   M SPIKE  See Comments   M Robert %  See Comments   (H): Data is abnormally high  (L): Data is abnormally low    Latest Reference Range & Units 01/30/24 09:18   Folate 7.0 - 31.4 ng/mL 13.8   Vitamin B12 213 - 816 pg/mL 241          Assessment:       1. Hypergammaglobulinemia              Plan:       MM labs drawn on 8/15/24 show normal immunofixation. No mspike.   At the time of her labs, she was recovering from a viral infection. Reports cough x 6 weeks. Her CBC and CMP with abnormalities that could be contributed to viral infection. She will be seeing PCP with labs again next month.    RTC prn.    DONNA Acosta

## 2024-09-05 DIAGNOSIS — E11.65 TYPE 2 DIABETES MELLITUS WITH HYPERGLYCEMIA, WITHOUT LONG-TERM CURRENT USE OF INSULIN: Primary | ICD-10-CM

## 2024-09-05 RX ORDER — GLIMEPIRIDE 4 MG/1
4 TABLET ORAL 2 TIMES DAILY
Qty: 180 TABLET | Refills: 0 | Status: SHIPPED | OUTPATIENT
Start: 2024-09-05

## 2024-09-05 NOTE — TELEPHONE ENCOUNTER
----- Message from Beata Muro sent at 9/5/2024  8:36 AM CDT -----  Who Called: Angelia Dooley    Refill or New Rx:New    RX Name and Strength:glimepiride (AMARYL) 4 MG tablet  How is the patient currently taking it? (ex. 1XDay):2x  Is this a 30 day or 90 day RX:90  Local or Mail Order:local  List of preferred pharmacies on file (remove unneeded): [unfilled]  If different Pharmacy is requested, enter Pharmacy information here including location and phone number: [facility]/PHARMACY #[id number] - [place], [place] - [address] AT [facility]   Ordering Provider:Dr Beltran      Preferred Method of Contact: Phone Call  Patient's Preferred Phone Number on File: [phone]   Best Call Back Number, if different:  Additional Information:

## 2024-10-09 ENCOUNTER — LAB VISIT (OUTPATIENT)
Dept: LAB | Facility: HOSPITAL | Age: 69
End: 2024-10-09
Attending: FAMILY MEDICINE
Payer: MEDICARE

## 2024-10-09 DIAGNOSIS — E78.2 MIXED HYPERLIPIDEMIA: ICD-10-CM

## 2024-10-09 DIAGNOSIS — Z79.4 TYPE 2 DIABETES MELLITUS WITH HYPERGLYCEMIA, WITH LONG-TERM CURRENT USE OF INSULIN: ICD-10-CM

## 2024-10-09 DIAGNOSIS — Z00.00 MEDICARE ANNUAL WELLNESS VISIT, SUBSEQUENT: ICD-10-CM

## 2024-10-09 DIAGNOSIS — E11.65 TYPE 2 DIABETES MELLITUS WITH HYPERGLYCEMIA, WITH LONG-TERM CURRENT USE OF INSULIN: ICD-10-CM

## 2024-10-09 LAB
ALBUMIN SERPL-MCNC: 3.6 G/DL (ref 3.4–4.8)
ALBUMIN/GLOB SERPL: 0.9 RATIO (ref 1.1–2)
ALP SERPL-CCNC: 43 UNIT/L (ref 40–150)
ALT SERPL-CCNC: 14 UNIT/L (ref 0–55)
ANION GAP SERPL CALC-SCNC: 11 MEQ/L
AST SERPL-CCNC: 23 UNIT/L (ref 5–34)
BACTERIA #/AREA URNS AUTO: ABNORMAL /HPF
BASOPHILS # BLD AUTO: 0.05 X10(3)/MCL
BASOPHILS NFR BLD AUTO: 1.2 %
BILIRUB SERPL-MCNC: 0.6 MG/DL
BILIRUB UR QL STRIP.AUTO: NEGATIVE
BUN SERPL-MCNC: 19.7 MG/DL (ref 9.8–20.1)
CALCIUM SERPL-MCNC: 9.6 MG/DL (ref 8.4–10.2)
CHLORIDE SERPL-SCNC: 103 MMOL/L (ref 98–107)
CHOLEST SERPL-MCNC: 176 MG/DL
CHOLEST/HDLC SERPL: 3 {RATIO} (ref 0–5)
CLARITY UR: CLEAR
CO2 SERPL-SCNC: 28 MMOL/L (ref 23–31)
COLOR UR AUTO: YELLOW
CREAT SERPL-MCNC: 0.87 MG/DL (ref 0.55–1.02)
CREAT UR-MCNC: 242.2 MG/DL (ref 45–106)
CREAT/UREA NIT SERPL: 23
EOSINOPHIL # BLD AUTO: 0.33 X10(3)/MCL (ref 0–0.9)
EOSINOPHIL NFR BLD AUTO: 7.6 %
ERYTHROCYTE [DISTWIDTH] IN BLOOD BY AUTOMATED COUNT: 14.6 % (ref 11.5–17)
EST. AVERAGE GLUCOSE BLD GHB EST-MCNC: 154.2 MG/DL
GFR SERPLBLD CREATININE-BSD FMLA CKD-EPI: >60 ML/MIN/1.73/M2
GLOBULIN SER-MCNC: 4.1 GM/DL (ref 2.4–3.5)
GLUCOSE SERPL-MCNC: 151 MG/DL (ref 82–115)
GLUCOSE UR QL STRIP: NORMAL
HBA1C MFR BLD: 7 %
HCT VFR BLD AUTO: 34.7 % (ref 37–47)
HDLC SERPL-MCNC: 54 MG/DL (ref 35–60)
HGB BLD-MCNC: 11.5 G/DL (ref 12–16)
HGB UR QL STRIP: ABNORMAL
HYALINE CASTS #/AREA URNS LPF: ABNORMAL /LPF
IMM GRANULOCYTES # BLD AUTO: 0.01 X10(3)/MCL (ref 0–0.04)
IMM GRANULOCYTES NFR BLD AUTO: 0.2 %
KETONES UR QL STRIP: NEGATIVE
LDLC SERPL CALC-MCNC: 107 MG/DL (ref 50–140)
LEUKOCYTE ESTERASE UR QL STRIP: 25
LYMPHOCYTES # BLD AUTO: 1.44 X10(3)/MCL (ref 0.6–4.6)
LYMPHOCYTES NFR BLD AUTO: 33.2 %
MCH RBC QN AUTO: 30.3 PG (ref 27–31)
MCHC RBC AUTO-ENTMCNC: 33.1 G/DL (ref 33–36)
MCV RBC AUTO: 91.6 FL (ref 80–94)
MICROALBUMIN UR-MCNC: 18.4 UG/ML
MICROALBUMIN/CREAT RATIO PNL UR: 7.6 MG/GM CR (ref 0–30)
MONOCYTES # BLD AUTO: 0.54 X10(3)/MCL (ref 0.1–1.3)
MONOCYTES NFR BLD AUTO: 12.4 %
MUCOUS THREADS URNS QL MICRO: ABNORMAL /LPF
NEUTROPHILS # BLD AUTO: 1.97 X10(3)/MCL (ref 2.1–9.2)
NEUTROPHILS NFR BLD AUTO: 45.4 %
NITRITE UR QL STRIP: NEGATIVE
NRBC BLD AUTO-RTO: 0 %
PH UR STRIP: 5 [PH]
PLATELET # BLD AUTO: 227 X10(3)/MCL (ref 130–400)
PMV BLD AUTO: 10.1 FL (ref 7.4–10.4)
POTASSIUM SERPL-SCNC: 3.7 MMOL/L (ref 3.5–5.1)
PROT SERPL-MCNC: 7.7 GM/DL (ref 5.8–7.6)
PROT UR QL STRIP: NEGATIVE
RBC # BLD AUTO: 3.79 X10(6)/MCL (ref 4.2–5.4)
RBC #/AREA URNS AUTO: ABNORMAL /HPF
SODIUM SERPL-SCNC: 142 MMOL/L (ref 136–145)
SP GR UR STRIP.AUTO: 1.02 (ref 1–1.03)
SQUAMOUS #/AREA URNS LPF: ABNORMAL /HPF
TRIGL SERPL-MCNC: 77 MG/DL (ref 37–140)
TSH SERPL-ACNC: 0.91 UIU/ML (ref 0.35–4.94)
UROBILINOGEN UR STRIP-ACNC: NORMAL
VLDLC SERPL CALC-MCNC: 15 MG/DL
WBC # BLD AUTO: 4.34 X10(3)/MCL (ref 4.5–11.5)
WBC #/AREA URNS AUTO: ABNORMAL /HPF

## 2024-10-09 PROCEDURE — 80061 LIPID PANEL: CPT

## 2024-10-09 PROCEDURE — 81015 MICROSCOPIC EXAM OF URINE: CPT

## 2024-10-09 PROCEDURE — 84443 ASSAY THYROID STIM HORMONE: CPT

## 2024-10-09 PROCEDURE — 36415 COLL VENOUS BLD VENIPUNCTURE: CPT

## 2024-10-09 PROCEDURE — 82570 ASSAY OF URINE CREATININE: CPT

## 2024-10-09 PROCEDURE — 83036 HEMOGLOBIN GLYCOSYLATED A1C: CPT

## 2024-10-09 PROCEDURE — 80053 COMPREHEN METABOLIC PANEL: CPT

## 2024-10-09 PROCEDURE — 85025 COMPLETE CBC W/AUTO DIFF WBC: CPT

## 2024-10-17 ENCOUNTER — OFFICE VISIT (OUTPATIENT)
Dept: FAMILY MEDICINE | Facility: CLINIC | Age: 69
End: 2024-10-17
Payer: MEDICARE

## 2024-10-17 VITALS
HEIGHT: 63 IN | SYSTOLIC BLOOD PRESSURE: 130 MMHG | TEMPERATURE: 98 F | WEIGHT: 156.88 LBS | DIASTOLIC BLOOD PRESSURE: 79 MMHG | RESPIRATION RATE: 18 BRPM | OXYGEN SATURATION: 98 % | BODY MASS INDEX: 27.8 KG/M2 | HEART RATE: 71 BPM

## 2024-10-17 DIAGNOSIS — E11.65 TYPE 2 DIABETES MELLITUS WITH HYPERGLYCEMIA, WITHOUT LONG-TERM CURRENT USE OF INSULIN: ICD-10-CM

## 2024-10-17 DIAGNOSIS — Z72.3 LACK OF PHYSICAL ACTIVITY: ICD-10-CM

## 2024-10-17 DIAGNOSIS — F33.1 MODERATE EPISODE OF RECURRENT MAJOR DEPRESSIVE DISORDER: ICD-10-CM

## 2024-10-17 DIAGNOSIS — F41.9 ANXIETY: ICD-10-CM

## 2024-10-17 DIAGNOSIS — Z00.00 ENCOUNTER FOR PREVENTIVE HEALTH EXAMINATION: ICD-10-CM

## 2024-10-17 DIAGNOSIS — T46.6X5A MYALGIA DUE TO STATIN: ICD-10-CM

## 2024-10-17 DIAGNOSIS — E11.59 HYPERTENSION ASSOCIATED WITH DIABETES: Chronic | ICD-10-CM

## 2024-10-17 DIAGNOSIS — M79.10 MYALGIA DUE TO STATIN: ICD-10-CM

## 2024-10-17 DIAGNOSIS — E78.5 HYPERLIPIDEMIA ASSOCIATED WITH TYPE 2 DIABETES MELLITUS: ICD-10-CM

## 2024-10-17 DIAGNOSIS — F41.1 GAD (GENERALIZED ANXIETY DISORDER): ICD-10-CM

## 2024-10-17 DIAGNOSIS — Z00.00 MEDICARE ANNUAL WELLNESS VISIT, SUBSEQUENT: Primary | ICD-10-CM

## 2024-10-17 DIAGNOSIS — Z86.0100 HISTORY OF COLONIC POLYPS: ICD-10-CM

## 2024-10-17 DIAGNOSIS — E53.8 VITAMIN B12 DEFICIENCY: ICD-10-CM

## 2024-10-17 DIAGNOSIS — I15.2 HYPERTENSION ASSOCIATED WITH DIABETES: Chronic | ICD-10-CM

## 2024-10-17 DIAGNOSIS — F51.01 PRIMARY INSOMNIA: Chronic | ICD-10-CM

## 2024-10-17 DIAGNOSIS — R31.29 MICROSCOPIC HEMATURIA: ICD-10-CM

## 2024-10-17 DIAGNOSIS — Z79.899 LONG-TERM CURRENT USE OF BENZODIAZEPINE: ICD-10-CM

## 2024-10-17 DIAGNOSIS — N39.46 MIXED INCONTINENCE URGE AND STRESS (MALE)(FEMALE): ICD-10-CM

## 2024-10-17 DIAGNOSIS — E11.69 HYPERLIPIDEMIA ASSOCIATED WITH TYPE 2 DIABETES MELLITUS: ICD-10-CM

## 2024-10-17 LAB
BACTERIA #/AREA URNS AUTO: ABNORMAL /HPF
BILIRUB UR QL STRIP.AUTO: NEGATIVE
CLARITY UR: CLEAR
COLOR UR AUTO: ABNORMAL
GLUCOSE UR QL STRIP: NORMAL
HGB UR QL STRIP: NEGATIVE
HYALINE CASTS #/AREA URNS LPF: ABNORMAL /LPF
KETONES UR QL STRIP: NEGATIVE
LEUKOCYTE ESTERASE UR QL STRIP: NEGATIVE
MUCOUS THREADS URNS QL MICRO: ABNORMAL /LPF
NITRITE UR QL STRIP: NEGATIVE
PH UR STRIP: 5.5 [PH]
PROT UR QL STRIP: NEGATIVE
RBC #/AREA URNS AUTO: ABNORMAL /HPF
SP GR UR STRIP.AUTO: 1.02 (ref 1–1.03)
SQUAMOUS #/AREA URNS LPF: ABNORMAL /HPF
UROBILINOGEN UR STRIP-ACNC: NORMAL
WBC #/AREA URNS AUTO: ABNORMAL /HPF

## 2024-10-17 PROCEDURE — 81001 URINALYSIS AUTO W/SCOPE: CPT | Performed by: FAMILY MEDICINE

## 2024-10-17 RX ORDER — SERTRALINE HYDROCHLORIDE 25 MG/1
25 TABLET, FILM COATED ORAL DAILY
Qty: 30 TABLET | Refills: 1 | Status: SHIPPED | OUTPATIENT
Start: 2024-10-17 | End: 2025-10-17

## 2024-10-17 NOTE — PATIENT INSTRUCTIONS
Medicare Annual Wellness Visit      Patient Name: Angelia Dooley  Today's Date: 10/17/2024    Below you will find your 5-10 year Screening Plan Recommendations:  Health Maintenance       Date Due Completion Date    TETANUS VACCINE Never done ---    RSV Vaccine (Age 60+ and Pregnant patients) (1 - Risk 60-74 years 1-dose series) Never done ---    Mammogram 11/30/2024 11/30/2023    COVID-19 Vaccine (11 - 2024-25 season) 12/05/2024 10/10/2024    Foot Exam 01/29/2025 1/29/2024    Hemoglobin A1c 04/09/2025 10/9/2024    Eye Exam 06/12/2025 6/12/2024    DEXA Scan 08/04/2025 8/4/2022    Diabetes Urine Screening 10/09/2025 10/9/2024    Lipid Panel 10/09/2025 10/9/2024    Colorectal Cancer Screening 02/16/2027 2/16/2022          Below is your summarized Personalized Prevention Plan that addresses any concerns we discussed today at your visit. Please see attached detailed information specific to your Health Concerns.  No orders of the defined types were placed in this encounter.        The following information is provided to all patients.  This information is to help you find additional resources for any problems that may be affecting your health: Living healthy guide: www.[facility].[place].gov      Understanding Diabetes: www.diabetes.org      Eating healthy: www.cdc.gov/healthyweight      CDC home safety checklist: www.cdc.gov/steadi/patient.html      Agency on Aging: www.goea.[place].[facility]      Alcoholics anonymous (AA): www.aa.org      Physical Activity: www.daxa.nih.gov/nc6azpp      Tobacco use: www.quitwithusla.org                                 Patient Education       Anxiety Discharge Instructions, Adult   About this topic   Anxiety can cause you to feel very worried. It can also cause physical symptoms like chest pain, stomach aches, or trouble sleeping. While mild anxiety is a normal response to stress, it can cause you problems in your everyday life. You may need follow-up care to help manage your anxiety. Anxiety  happens in many forms, like:  Being scared all the time that something bad is going to happen. This is generalized anxiety.  Strong bursts of fear where your body has signs that may feel like a heart attack. This is called a panic attack.  Upsetting thoughts that happen often. There is a need to repeat doing certain things to help get rid of the anxiety caused by these thoughts. The thoughts or actions may be about checking on things, touching things, or worry about germs. This is an obsessive-compulsive disorder.  Strong fear of an object, place, or condition. This is a phobia.  Fear that others think bad things about you or being put down by other people. This is social anxiety.  Nightmares, flashbacks, staying away from people, or having panic attacks when reminded of a shocking or hurtful time or place from the past. This is post-traumatic stress.  Anxiety disorder may be treated in many ways. Some kinds of treatment have you talk about your beliefs, fears, and worries. You may learn how certain thoughts or feelings can raise anxiety. You may also learn what steps to take to lower anxiety. Other kinds of treatment may have you look back on a hurtful event, sad memory, or something you are afraid of. The doctor will help you deal with the feelings that you may have. You may learn ways to cope with unwanted events or thoughts by looking at your fears in a way that feels safe.  What care is needed at home?   Ask your doctor what you need to do when you go home. Make sure you ask questions if you do not understand what the doctor says.  Set a time to talk with a counselor about your worries and feelings. This can help you with your anxiety.  Take care to follow all instructions when you take your medicines.  Limit alcohol and caffeine.  Learn ways to manage stress. Relaxation methods like reflection, deep breathing, and muscle relaxation may be helpful. Things like yoga, exercise, and svetlana chi are also good.  Talk  about your feelings with family members and friends you trust. Talk to someone who can help you see how your thoughts at certain times may raise your anxiety.     What follow-up care is needed?   Your doctor may ask you to make visits to the office to check on your progress. Be sure to keep these visits.  What drugs may be needed?   The doctor may order drugs to help the physical signs of anxiety. Make sure that you take the drugs as taught to you by the doctor. Talk with your doctor about any side effects and ask how long you should take the drug.  Will physical activity be limited?   You may take part in physical activities. Some people are limited because of their anxiety or fear. Talk with your doctor about the right amount of activity for you.  What changes to diet are needed?   Eat a variety of healthy foods and limit drinks with caffeine. You should avoid alcohol, energy drinks, and over-the-counter stimulants.  What problems could happen?   If your anxiety is not treated, it can result in:  Staying away from work or social events  Not being able to do everyday tasks  Keeping away from family and friends  What can be done to prevent this health problem?   Learn what events, people, or things upset you. Limit your contact with them.  Talk about your feelings. Talk to someone who can help you see how your thoughts at certain times may raise your anxiety.  Seek support from your friends and family. Find someone who calms you down. Ask if you can call them when you are getting anxious.  When do I need to call the doctor?   You feel you may harm yourself or someone else.  You can also call a mental health hotline for help.  You have any physical symptoms, such as chest pain, trouble breathing, or severe belly pain, that could be a sign of a serious problem.  If you are short of breath.  If you do not feel like you can be alone.  Teach Back: Helping You Understand   The Teach Back Method helps you understand the  information we are giving you. After you talk with the staff, tell them in your own words what you learned. This helps to make sure the staff has described each thing clearly. It also helps to explain things that may have been confusing. Before going home, make sure you can do these:  I can tell you about my condition and the drugs I need to take.  I can tell you what may help lower my anxiety.  I can tell you what I will do if it is hard to breathe or I have chest pain.  I can tell you what I will do if I do not feel safe or cannot be alone.  Where can I learn more?   TEE  https://www.tee.org/Learn-More/Mental-Health-Conditions/Anxiety-Disorders   National Health Service  https://www.nhs.uk/conditions/generalised-anxiety-disorder/symptoms/   [place] of Health - [facility] Health  https://www.daxa.nih.gov/health/relieving-stress-anxiety-nmvnjdoli-oljxzcgwwv-zslhticfqc   National [place] of Mental Health  http://www.[facility].nih.gov/health/publications/anxiety-disorders/complete-index.shtml   Last Reviewed Date   2021-06-08  Consumer Information Use and Disclaimer   This information is not specific medical advice and does not replace information you receive from your health care provider. This is only a brief summary of general information. It does NOT include all information about conditions, illnesses, injuries, tests, procedures, treatments, therapies, discharge instructions or life-style choices that may apply to you. You must talk with your health care provider for complete information about your health and treatment options. This information should not be used to decide whether or not to accept your health care providers advice, instructions or recommendations. Only your health care provider has the knowledge and training to provide advice that is right for you.  Copyright   Copyright © 2021 UpToDate, Inc. and its affiliates and/or licensors. All rights reserved.    Patient Education       Exercise and  Aging   General   Exercise can help older adults prevent falls and stay independent longer. Exercise may also help:  You have stronger muscles and bones and better balance  You lose weight and have more energy  Make your heart and lungs stronger  Lower your chance of health problems like heart disease, high blood sugar, or breast or colon cancer  Control conditions like high blood pressure and diabetes  You manage stress and get better sleep  Your mood, self-esteem, and self-image  How you think, plan, and pay attention  There are four types of exercise: endurance, strength, balance, and flexibility.  Endurance exercises get your heart rate up and keep it up for a while. Most people should get at least 30 minutes of exercise that gets your heart rate up on 5 or more days each week. Walking, swimming, biking, or going up and down stairs are kinds of exercises to get your heart rate up. You do not have to do all 30 minutes at one time. Try doing 10 minutes 3 times a day.  Strength exercises build muscle. Lifting weights or doing knee bends are kinds of strength exercises.  Balance exercises help to prevent falls. Raise up on your toes or stand on one leg as a kind of balance exercise.  Flexibility exercises move your joints through a full range of motion and stretch your muscles. Bend forward in a chair to stretch your back, do stretches, or bend and extend your arms or legs as a kind of flexibility exercise. Try doing 10 minutes twice a week.  Plan to include all these exercise types in your exercise program. Always check with your doctor before you start a new exercise program.  Some people do not like formal exercise classes, but there are many ways to work your muscles each day. Here are some things you can do.  Use steps instead of elevators.  Park far away in parking lots to walk farther.  Use the time while you wait. Do knee bends as you hold onto the kitchen counter while you wait for your coffee to  brew.  When you unload groceries, lift the bags or a container of milk a few times to exercise your arms and legs.  Walk the long way when you go somewhere.  After you walk to the bathroom, walk a few laps around the house before sitting down.  Try doing different standing exercises after you wash your hands each time in the bathroom.  Do balance exercises while you brush your teeth.  Do an exercise or get up and walk during TV commercials.  Don't forget to exercise small muscle groups like your hands, fingers, ankles, toes, and neck. Wiggle, bend, flex, and rotate these joints from left to right and back to front to help to keep these joints flexible.  When do I need to call the doctor?   Stop exercising and talk to your doctor if you have any of these problems:  Dizziness  Shortness of breath  Pain or pressure in the chest, arms, throat, jaw, or back  Nausea or vomiting  Blood clots  Infection  Joint swelling  Open wounds  Recent hip, back, or eye surgery  New problems that start during exercise  Helpful tips   If you have a health problem like heart disease or diabetes, talk with your doctor about the best exercises for you.  Always warm up before you stretch. Heated muscles stretch much easier than cool muscles. Try to walk or bike at an easy pace for a few minutes to warm up your muscles.  Slow your pace again after you exercise to cool down and bring your heart rate down slowly.  Be sure you do not hold your breath when you exercise because it can raise your blood pressure. If you tend to hold your breath, try to count out loud when you exercise.  Never bounce when doing stretches. Use slow and steady motions and hold your stretch for 20 to 30 seconds.  Have a routine. Doing exercises before a meal may be a good way to get into a routine.  Set small goals for yourself when you start to exercise. Use a chart to see how much you are doing. Ask someone to exercise with you.  Exercise may be slightly  "uncomfortable, but you should not have sharp pains. If you do get sharp pains, stop what you are doing. If the sharp pains continue, call your doctor.  Drink plenty of fluids without caffeine when you exercise and afterwards. Avoid outdoor exercise if it is too cold or too hot.  Wear the right clothes and shoes. Try layers of clothes, so that you can take them off if you get too hot. Shoes should fit well and support your feet.  Where can I learn more?   National [place] on Aging  https://www.daxa.nih.gov/health/publication/exercise-physical-activity/introduction   Last Reviewed Date   2021-03-18  Consumer Information Use and Disclaimer   This information is not specific medical advice and does not replace information you receive from your health care provider. This is only a brief summary of general information. It does NOT include all information about conditions, illnesses, injuries, tests, procedures, treatments, therapies, discharge instructions or life-style choices that may apply to you. You must talk with your health care provider for complete information about your health and treatment options. This information should not be used to decide whether or not to accept your health care providers advice, instructions or recommendations. Only your health care provider has the knowledge and training to provide advice that is right for you.  Copyright   Copyright © 2021 UpToDate, Inc. and its affiliates and/or licensors. All rights reserved.    Patient Education       Urinary Incontinence   The Basics   Written by the doctors and editors at [employer]   What is urinary incontinence? -- "Urinary incontinence" is the medical term for when a person leaks urine or loses bladder control. Often it is called just "incontinence."  Incontinence is a very common problem. If you have this problem, there are treatments that can help. There are also things you can do on your own to stop or reduce urine leakage so you don't have " "to "just live with it."   What are the symptoms of incontinence? -- There are different types of incontinence. Each causes different symptoms. The 3 most common types are:  Stress incontinence - People with stress incontinence leak urine when they laugh, cough, sneeze, or do anything that "stresses" the belly. Stress incontinence is most common in women, especially those who have had a baby.  Urgency incontinence - People with urgency incontinence feel a strong need to urinate all of a sudden. Often the "urge" is so strong that they can't make it to the bathroom in time. "Overactive bladder" is another term for having a sudden, frequent urge to urinate. People with overactive bladder might or might not actually leak urine.  Mixed incontinence - People with mixed incontinence have symptoms of both stress and urgency incontinence.  Is there anything I can do on my own to feel better? -- Yes. Here are some steps that can help reduce urine leaks:  Reduce the amount of liquid you drink, especially a few hours before bed.  Cut down on any foods or drinks that make your symptoms worse. Some people find that alcohol, caffeine, or spicy or acidic foods irritate the bladder.  If you are overweight, lose weight.  If you have diabetes, keep your blood sugar as close to normal as possible.  If you take medicines called diuretics, plan ahead. These medicines increase the need to urinate. Take them when you know you will be near a bathroom for a few hours.  These techniques can also help improve bladder control:  Bladder retraining - During bladder retraining, you go to the bathroom at scheduled times. For instance, you might decide that you will go every hour. You would make yourself go every hour, even if you didn't feel like you needed to. And you would try to wait until a whole hour had passed if you needed to go sooner. Then, once you got used to going every hour, you would increase the amount of time you waited in between " "bathroom visits. Over time, you might be able to "retrain" your bladder to wait 3 or 4 hours between bathroom visits.  Pelvic muscle exercises - Pelvic muscle exercises strengthen the muscles that control the flow of urine. These exercises can help, but people often do them wrong. Ask your doctor or nurse how to do them right. Your doctor might suggest working with a physical therapist who has special training in these exercises.  Should I see my doctor or nurse? -- Yes. Your doctor or nurse can find out what might be causing your incontinence. They can also suggest ways to relieve the problem.  When you speak to your doctor or nurse, ask if any of the medicines you take could be causing your symptoms. Some medicines can cause incontinence or make it worse.  How is incontinence treated? -- The treatment options differ depending on what type of incontinence you have, and whether you are a man or a woman. Some of the treatment options include:  Medicines to relax the bladder  Surgery to repair the tissues that support the bladder or to improve the flow of urine  Electrical stimulation of the nerves that relax the bladder  What will my life be like? -- Many people with incontinence can regain bladder control or at least reduce the amount of leakage they have. The key is to speak up about it to your doctor or nurse. Then work with them to find an approach that helps you.  All topics are updated as new evidence becomes available and our peer review process is complete.  This topic retrieved from [employer] on: Sep 21, 2021.  Topic 67256 Version 12.0  Release: 29.4.2 - C29.263  © 2021 UpToDate, Inc. and/or its affiliates. All rights reserved.  figure 1: Location of the bladder     This drawing shows the side view of a woman's body. The bladder is in front of the vagina. The urethra is the tube that carries urine from the bladder out of the body.  Graphic 601711 Version 1.0    Consumer Information Use and Disclaimer   This " information is not specific medical advice and does not replace information you receive from your health care provider. This is only a brief summary of general information. It does NOT include all information about conditions, illnesses, injuries, tests, procedures, treatments, therapies, discharge instructions or life-style choices that may apply to you. You must talk with your health care provider for complete information about your health and treatment options. This information should not be used to decide whether or not to accept your health care provider's advice, instructions or recommendations. Only your health care provider has the knowledge and training to provide advice that is right for you. The use of this information is governed by the [employer] End User License Agreement, available at https://www.[employer]/en/solutions/[employer].com/about/norma.The use of [employer] content is governed by the [employer] Terms of Use. ©2021 UpToDate, Inc. All rights reserved.  Copyright   © 2021 UpToDate, Inc. and/or its affiliates. All rights reserved.

## 2024-10-17 NOTE — PROGRESS NOTES
Family Medicine      Patient ID: [id number]     Chief Complaint: Medicare Annual Wellness     HPI:     Angelia Dooley is a 69 y.o. female here today for a Medicare Annual Wellness visit and comprehensive Health Risk Assessment.     A separate E/M code has been provided to evaluate additional complaints that the patient would like addressed during the dedicated Medicare Wellness Exam. Patient c/o anxiety, some anehdonia, excessive worry and insomnia. She constantly worries about her mom in the nursing home and feels extreme guilt.  Denies si/hi. Appetite is normal. Cries often and easily.     Health Maintenance         Date Due Completion Date    TETANUS VACCINE Never done ---    RSV Vaccine (Age 60+ and Pregnant patients) (1 - Risk 60-74 years 1-dose series) Never done ---    Mammogram 11/30/2024 11/30/2023    COVID-19 Vaccine (11 - 2024-25 season) 12/05/2024 10/10/2024    Foot Exam 01/29/2025 1/29/2024    Hemoglobin A1c 04/09/2025 10/9/2024    Eye Exam 06/12/2025 6/12/2024    DEXA Scan 08/04/2025 8/4/2022    Diabetes Urine Screening 10/09/2025 10/9/2024    Lipid Panel 10/09/2025 10/9/2024    Colorectal Cancer Screening 02/16/2027 2/16/2022             Past Medical History:   Diagnosis Date    Diabetes mellitus, type 2     Hyperlipidemia     Hypertension     Personal history of colonic polyps 02/16/2022        Past Surgical History:   Procedure Laterality Date    CHOLECYSTECTOMY      COLONOSCOPY W/ POLYPECTOMY  02/16/2022    CYST REMOVAL  10/07/2023    scalp    HYSTERECTOMY      LEFT HEART CATHETERIZATION Left 11/10/2023    Procedure: Left heart cath;  Surgeon: Nickolas Cooper MD;  Location: [facility] CATH LAB;  Service: Cardiology;  Laterality: Left;    MYOMECTOMY          Social History     Socioeconomic History    Marital status:    Tobacco Use    Smoking status: Never    Smokeless tobacco: Never   Substance and Sexual Activity    Alcohol use: Not Currently     Alcohol/week: 1.0 standard drink of  alcohol     Types: 1 Glasses of wine per week    Drug use: Never    Sexual activity: Not Currently     Partners: Male     Birth control/protection: None     Social Drivers of Health     Financial Resource Strain: Low Risk  (10/17/2024)    Overall Financial Resource Strain (CARDIA)     Difficulty of Paying Living Expenses: Not hard at all   Food Insecurity: No Food Insecurity (10/17/2024)    Hunger Vital Sign     Worried About Running Out of Food in the Last Year: Never true     Ran Out of Food in the Last Year: Never true   Transportation Needs: No Transportation Needs (10/17/2024)    TRANSPORTATION NEEDS     Transportation : No   Physical Activity: Inactive (10/17/2024)    Exercise Vital Sign     Days of Exercise per Week: 0 days     Minutes of Exercise per Session: 0 min   Stress: Stress Concern Present (5/8/2024)    [employer] [place] [employer] - Occupational Stress Questionnaire     Feeling of Stress : Very much   Housing Stability: Low Risk  (10/17/2024)    Housing Stability Vital Sign     Unable to Pay for Housing in the Last Year: No     Homeless in the Last Year: No        Family History   Problem Relation Name Age of Onset    Diabetes type I Mother Roxanne Brady     Heart failure Mother Roxanne Brady     Arthritis Mother Roxanne Brady     Depression Mother Roxanne Brady     Diabetes Mother Roxanne Brady     Hypertension Mother Roxanne Brady     Kidney disease Mother Roxanne Brady     Vision loss Mother Roxanne Brady     Heart failure Father Kwan Dominguez     Hypertension Father Kwan Dominguez     Heart disease Father Kwan Dominguez         Current Outpatient Medications   Medication Instructions    albuterol (PROVENTIL/VENTOLIN HFA) 90 mcg/actuation inhaler 2 puffs, Inhalation, Every 4 hours PRN, Rescue    ALPRAZolam (XANAX) 0.25 mg, Oral, 3 times daily    bisoprolol (ZEBETA) 5 MG tablet See Instructions, TAKE 1 TABLET BY MOUTH EVERY DAY, # 90 tab(s), 3 Refill(s), Pharmacy: [facility]/pharmacy #[id number], 163, cm, Height/Length  Dosing, 03/31/22 10:47:00 CDT, 69, kg, Weight Dosing, 03/31/22 10:47:00 CDT    blood sugar diagnostic Strp To check BG 1 times daily, to use with insurance preferred meter    CONTOUR NEXT ONE METER Misc USE TO TEST BLOOD GLUCOSE TWICE DAILY    glimepiride (AMARYL) 4 mg, Oral, 2 times daily    JANUVIA 100 mg, Nightly    pioglitazone (ACTOS) 30 mg, Oral, Daily    pravastatin (PRAVACHOL) 20 mg, Oral, Daily    ramipriL (ALTACE) 10 mg, Oral, Daily    sertraline (ZOLOFT) 25 mg, Oral, Daily    triamterene-hydrochlorothiazide 37.5-25 mg (DYAZIDE) 37.5-25 mg per capsule 1 capsule, Oral, Daily    valACYclovir (VALTREX) 500 mg, Daily    vitamin E acetate (VITAMIN E ORAL) 400 Units, Daily       Review of patient's allergies indicates:   Allergen Reactions    Acetaminophen-codeine     Bactrim [sulfamethoxazole-trimethoprim]     Cyclobenzaprine     Hydrocodone-acetaminophen     Tramadol     Versed [midazolam] Nausea Only        Immunization History   Administered Date(s) Administered    COVID-19 MRNA, LN-S PF (MODERNA HALF 0.25 ML DOSE) 11/10/2021    COVID-19 Vaccine 02/19/2021, 03/19/2021, 11/10/2021, 05/18/2022    COVID-19, MRNA, LN-S, PF (MODERNA FULL 0.5 ML DOSE) 02/19/2021, 03/19/2021, 05/18/2022, 10/05/2022    COVID-19, MRNA, LN-S, PF (Pfizer) (Purple Cap) 10/05/2022    COVID-19, mRNA, LNP-S, PF, dayanna-sucrose, 30 mcg/0.3 mL (Pfizer Ages 12+) 10/10/2024    COVID-19, mRNA, LNP-S, bivalent booster, PF (Moderna Omicron)12 + YEARS 10/05/2022    Influenza (FLUAD) - Quadrivalent - Adjuvanted - PF *Preferred* (65+) 09/07/2023    Influenza - Quadrivalent 09/28/2019    Influenza - Quadrivalent - High Dose - PF (65 years and older) 10/02/2021    Influenza - Quadrivalent - PF *Preferred* (6 months and older) 10/15/2013, 10/02/2014, 10/21/2015, 10/19/2016, 10/11/2017, 09/28/2019, 09/08/2020    Influenza - Trivalent - Fluarix, Flulaval, Fluzone, Afluria - PF 09/29/2011, 09/18/2012, 10/15/2013, 10/02/2014, 10/21/2015, 10/19/2016,  "10/11/2017, 09/28/2019    Influenza - Trivalent - Fluzone High Dose - PF (65 years and older) 10/02/2021, 09/19/2022, 09/18/2024    Pneumococcal Conjugate - 13 Valent 10/18/2021    Pneumococcal Polysaccharide - 23 Valent 10/25/2022    Zoster Recombinant 02/01/2019, 04/11/2019        Patient Care Team:  Apolonia Aguilar MD as PCP - General (Family Medicine)  Pablito Thapa MD as Consulting Physician (Obstetrics and Gynecology)  Nickolas Cooper MD as Consulting Physician (Cardiology)    Subjective:     Review of Systems    12 point review of systems conducted, negative except as stated in the history of present illness. See HPI for details.    Objective:     Visit Vitals  /79 (BP Location: Left arm, Patient Position: Sitting)   Pulse 71   Temp 97.9 °F (36.6 °C) (Oral)   Resp 18   Ht 5' 3" (1.6 m)   Wt 71.2 kg (156 lb 14.4 oz)   SpO2 98%   BMI 27.79 kg/m²       Physical Exam    Gen: alert, pleasant  Eyes: EOMI, no scleral injection  ENT: TM+ light reflexes bilaterally, no cerumen impaction or canal edema/erythema/discharge. No pharyngeal erythema, tonsillary hypertrophy  Oropharynx: no tonsillary hypertrophy, dental caries or disease. No lesions of palate, tongue.  CV: RRR no m/r/g, no LE edema. No JVD  Resp: CTAB, no accessory muscle use  GI: BS+, nontender, nondistended  Lymph: no cervical, postauricular, submandibular nodes  Psych: AA&Ox3    Assessment:       ICD-10-CM ICD-9-CM   1. Medicare annual wellness visit, subsequent  Z00.00 V70.0   2. Hypertension associated with diabetes  E11.59 250.80    I15.2 401.9   3. FRANKY (generalized anxiety disorder)  F41.1 300.02   4. Hyperlipidemia associated with type 2 diabetes mellitus  E11.69 250.80    E78.5 272.4   5. Type 2 diabetes mellitus with hyperglycemia, without long-term current use of insulin  E11.65 250.00     790.29   6. History of colonic polyps  Z86.0100 V12.72   7. Myalgia due to statin  M79.10 729.1    T46.6X5A E942.2   8. Long-term current " use of benzodiazepine  Z79.899 V58.69   9. Primary insomnia  F51.01 307.42   10. Encounter for preventive health examination  Z00.00 V70.0   11. Anxiety  F41.9 300.00   12. Lack of physical activity  Z72.3 V69.0   13. Mixed incontinence urge and stress (male)(female)  N39.46 788.33   14. Moderate episode of recurrent major depressive disorder  F33.1 296.32   15. Microscopic hematuria  R31.29 599.72   16. Vitamin B12 deficiency  E53.8 266.2        Plan:     1. Medicare annual wellness visit, subsequent  -     CBC Auto Differential; Future; Expected date: 10/17/2025  -     Comprehensive Metabolic Panel; Future; Expected date: 10/17/2025  -     Lipid Panel; Future; Expected date: 10/17/2025  -     TSH; Future; Expected date: 10/17/2025  -     Hemoglobin A1C; Future; Expected date: 10/17/2025  -     Urinalysis; Future; Expected date: 10/17/2025  -     Microalbumin/Creatinine Ratio, Urine; Future; Expected date: 10/17/2025    2. Hypertension associated with diabetes  Overview:  At goal on ramipril 10 mg, bisoprolol 5 mg, dyazide.  Neg C with Dr. Cooper 11/2023. Asymptomatic.     Continue current Rx meds        3. FRANKY (generalized anxiety disorder)  Overview:  Takes xanax prn sparingly, but has been having increased anxiety because she is caring for her ill mother. She is in need of refills.  reviewed.     FRANKY score 16. Crying often.     Risks of xanax including falls, confusion, addiction discussed with patient.     Will add zoloft 25 mg daily. I counseled patient on adverse effects such as n/v, d/c, anxiety, worsening depression or SI/HI. Call 911 or go to ED if you have SI/HI.   Take medication at bedtime if it causes drowsiness during the day.  Rtc 4 wks or sooner if needed      4. Hyperlipidemia associated with type 2 diabetes mellitus  Overview:  Ldl improved to 106 with the addition of pravastatin 20 mg.     The 10-year ASCVD risk score (Meryl CRAIN, et al., 2019) is: 26.4%    Values used to calculate the score:       Age: 68 years      Sex: Female      Is Non- : Yes      Diabetic: Yes      Tobacco smoker: No      Systolic Blood Pressure: 146 mmHg      Is BP treated: Yes      HDL Cholesterol: 49 mg/dL      Total Cholesterol: 173 mg/dL      Statin recommended. Risks and benefits of statin discussed with patient. LDL should be less than 100.     Patient tried lipitor and could not tolerate it due to myalgia  Continue pravastatin 20 mg        5. Type 2 diabetes mellitus with hyperglycemia, without long-term current use of insulin  Overview:  A1c down from 7.2 to 7.0. On Januvia 100 mg, actos 30 mg, glimepiride 4 mg bid. Fasting sugars range low 100s to 150's. NO lows. Has not been able to adhere to a diabetic diet lately    Continue current Rx meds  Rtc 3 mts with labs      Orders:  -     Hemoglobin A1C; Future; Expected date: 01/17/2025  -     Basic Metabolic Panel; Future; Expected date: 01/17/2025  -     Microalbumin/Creatinine Ratio, Urine; Future; Expected date: 01/17/2025  -     CBC Auto Differential; Future; Expected date: 10/17/2025  -     Comprehensive Metabolic Panel; Future; Expected date: 10/17/2025  -     Lipid Panel; Future; Expected date: 10/17/2025  -     TSH; Future; Expected date: 10/17/2025  -     Hemoglobin A1C; Future; Expected date: 10/17/2025  -     Urinalysis; Future; Expected date: 10/17/2025  -     Microalbumin/Creatinine Ratio, Urine; Future; Expected date: 10/17/2025  -     Folate; Future; Expected date: 10/17/2024  -     Vitamin B12; Future; Expected date: 10/17/2024    6. History of colonic polyps  Overview:  Repeat 5 years with dr hoover. Due 2027      7. Myalgia due to statin  Overview:  Failed lipitor      8. Long-term current use of benzodiazepine    9. Primary insomnia  Overview:  Takes xanax 0.25 mg nightly which works well. Failed trazodone and doxepin. Takes xanax sparingly for prn insomnia.     Continue current Rx meds        10. Encounter for preventive health  examination    11. Anxiety  -     sertraline (ZOLOFT) 25 MG tablet; Take 1 tablet (25 mg total) by mouth once daily.  Dispense: 30 tablet; Refill: 1    12. Lack of physical activity    13. Mixed incontinence urge and stress (male)(female)    14. Moderate episode of recurrent major depressive disorder  Overview:  Will add zoloft 25 mg daily. I counseled patient on adverse effects such as n/v, d/c, anxiety, worsening depression or SI/HI. Call 911 or go to ED if you have SI/HI.   Take medication at bedtime if it causes drowsiness during the day.  Rtc 4 wks or sooner if needed    Orders:  -     sertraline (ZOLOFT) 25 MG tablet; Take 1 tablet (25 mg total) by mouth once daily.  Dispense: 30 tablet; Refill: 1    15. Microscopic hematuria  -     Urinalysis, Reflex to Urine Culture    16. Vitamin B12 deficiency  Overview:  Taking otc b12 1000 mcg daily. Now b12 is increased    Decrease to 3x per week    Orders:  -     Folate; Future; Expected date: 10/17/2024  -     Vitamin B12; Future; Expected date: 10/17/2024         A comprehensive HEALTH RISK ASSESSMENT was completed today. Results are summarized below:    The following EMOTIONAL/SOCIAL CONCERNS were identified on today's screening for Social Isolation, Depression and Anxiety:  *Patient reports frequently feeling STRESSED or ANXIOUS. (Do you feel stressed (tense, restless, nervous, or anxious, or unable to sleep at night because your mind is troubled all the time)?: (!) Rather much (pt feels stressed on a daily basis due to taking care of her mother))  --A General Anxiety Disorder-7 Questionnaire was administered with the folllowing results: (FRANKY-7 Score: 16  Interpretation: Severe Anxiety)  <repeat PHQ-9>   There are NO COGNITIVE FUNCTION CONCERNS identified on today's screening.  The following FUNCTIONAL AND/OR SAFETY CONCERNS were identified on today's screening for Physical Symptoms, Nutritional, Home Safety/Living Situation, Fall Risk, Activities of Daily Living,  Independent Activities of Daily Living, Physical Activity,Timed Up and Go test and Whisper test::  *Patient reports PROBLEMS WITH BLADDER FUNCTION. (Do you have any problems with urination like going too frequently, trouble urinating, leakage or accidents?: (!) Yes (feels urinating more than normal))  *Patient reports NO ROUTINE EXERCISE. (On average, how many days per week do you engage in moderate to strenuous exercise (like a brisk walk)?: (!) 0)      The patient reports NO OPIOID PRESCRIPTIONS. This was confirmed through medication reconciliation and the [facility] website.    The patient is NOT A TOBACCO USER.  The patient reports NO SIGNIFICANT ALCOHOL USE.     All Questions regarding food, transportation or housing were not answered today.    The patient was asked and declined the use of a free .    Advance Care Planning   Today we discussed advance care planning. She is interested in learning more about how to make Advance Directives. Information was provided and I offered to discuss more at her discretion.         Provided patient with a 5-10 year written screening schedule and personal prevention plan. Recommendations were developed using the USPSTF age appropriate recommendations. Education, counseling, and referrals were provided as needed. After Visit Summary printed and given to patient, which includes a list of additional screenings\tests needed.    Follow up for 4 wks anxiety, 3 mts diabetes with labs and one year medicare wellness with labs. In addition to their scheduled follow up, the patient has also been instructed to follow up on as needed basis.     No future appointments.     EFREN WESTBROOK MD       Advance Care Planning   Today we discussed advance care planning. She is interested in learning more about how to make Advance Directives. Information was provided and I offered to discuss more at her discretion.

## 2024-10-21 ENCOUNTER — TELEPHONE (OUTPATIENT)
Dept: FAMILY MEDICINE | Facility: CLINIC | Age: 69
End: 2024-10-21
Payer: MEDICARE

## 2024-10-21 DIAGNOSIS — F33.1 MODERATE EPISODE OF RECURRENT MAJOR DEPRESSIVE DISORDER: Primary | ICD-10-CM

## 2024-10-21 NOTE — TELEPHONE ENCOUNTER
I spoke with patient and she is c/o anxiety and slight depression. Patient denies suicidal thoughts. She is requesting a referral to therapy to talk with someone before she starts taking the zoloft. She filled the zoloft, but didn't pick it up. Please advise. Marcelino

## 2024-10-21 NOTE — TELEPHONE ENCOUNTER
----- Message from Bailey sent at 10/21/2024 10:20 AM CDT -----  .Who Called: Angelia Dooley        Preferred Method of Contact: Phone Call  Patient's Preferred Phone Number on File: [phone]   Best Call Back Number, if different:  Additional Information: pt wants referral to behavioral health she said she really needs to talk to someone please advice

## 2024-10-23 ENCOUNTER — PATIENT MESSAGE (OUTPATIENT)
Dept: FAMILY MEDICINE | Facility: CLINIC | Age: 69
End: 2024-10-23
Payer: MEDICARE

## 2024-10-30 DIAGNOSIS — E11.65 TYPE 2 DIABETES MELLITUS WITH HYPERGLYCEMIA, WITHOUT LONG-TERM CURRENT USE OF INSULIN: ICD-10-CM

## 2024-10-31 ENCOUNTER — TELEPHONE (OUTPATIENT)
Dept: FAMILY MEDICINE | Facility: CLINIC | Age: 69
End: 2024-10-31
Payer: MEDICARE

## 2024-10-31 RX ORDER — GLIMEPIRIDE 4 MG/1
4 TABLET ORAL 2 TIMES DAILY
Qty: 180 TABLET | Refills: 0 | Status: SHIPPED | OUTPATIENT
Start: 2024-10-31

## 2024-11-07 ENCOUNTER — TELEPHONE (OUTPATIENT)
Dept: FAMILY MEDICINE | Facility: CLINIC | Age: 69
End: 2024-11-07
Payer: MEDICARE

## 2024-11-07 RX ORDER — SCOLOPAMINE TRANSDERMAL SYSTEM 1 MG/1
1 PATCH, EXTENDED RELEASE TRANSDERMAL
Qty: 3 PATCH | Refills: 0 | Status: SHIPPED | OUTPATIENT
Start: 2024-11-07 | End: 2024-11-16

## 2024-11-07 NOTE — TELEPHONE ENCOUNTER
----- Message from Carina sent at 11/7/2024  9:40 AM CST -----  Regarding: advice  Who Called: Angelia M Cowans [place]    Caller is requesting assistance/information from provider's office.    Symptoms (please be specific):    How long has patient had these symptoms:    List of preferred pharmacies on file (remove unneeded): [facility] [address]  If different, enter pharmacy into here including location and phone number:       Preferred Method of Contact: Phone Call  Patient's Preferred Phone Number on File: [phone]   Best Call Back Number, if different:    Additional Information: stated that she is going on a cruise soon and wanted to know if she can get a rx for motion sickness patches sent to the pharmacy for her. Please advise

## 2024-11-11 DIAGNOSIS — F41.1 GAD (GENERALIZED ANXIETY DISORDER): ICD-10-CM

## 2024-11-11 RX ORDER — ALPRAZOLAM 0.25 MG/1
0.25 TABLET ORAL 3 TIMES DAILY
Qty: 90 TABLET | Refills: 0 | Status: SHIPPED | OUTPATIENT
Start: 2024-11-11

## 2024-11-11 NOTE — TELEPHONE ENCOUNTER
----- Message from Jes sent at 11/11/2024  8:12 AM CST -----  Regarding: med refill  .Who Called: Angelia Dooley    Refill or New Rx:Refill  RX Name and Strength:ALPRAZolam (XANAX) 0.25 MG tablet  How is the patient currently taking it? (ex. 1XDay):3xday  Is this a 30 day or 90 day RX:90  Local or Mail Order:local  List of preferred pharmacies on file (remove unneeded): [facility]/pharmacy #[id number] - [place] - [address] [place] [address]   Phone: [phone]  Fax:[phone]  Ordering Provider:Yang      Preferred Method of Contact: Phone Call  Patient's Preferred Phone Number on File: [phone]   Best Call Back Number, if different:  Additional Information: pt needs authorization from  to refill prescription

## 2024-11-25 ENCOUNTER — OFFICE VISIT (OUTPATIENT)
Dept: FAMILY MEDICINE | Facility: CLINIC | Age: 69
End: 2024-11-25
Payer: MEDICARE

## 2024-11-25 ENCOUNTER — TELEPHONE (OUTPATIENT)
Dept: FAMILY MEDICINE | Facility: CLINIC | Age: 69
End: 2024-11-25

## 2024-11-25 VITALS
DIASTOLIC BLOOD PRESSURE: 72 MMHG | WEIGHT: 152.63 LBS | TEMPERATURE: 98 F | SYSTOLIC BLOOD PRESSURE: 132 MMHG | BODY MASS INDEX: 27.04 KG/M2 | OXYGEN SATURATION: 98 % | RESPIRATION RATE: 20 BRPM | HEIGHT: 63 IN | HEART RATE: 73 BPM

## 2024-11-25 DIAGNOSIS — R11.0 NAUSEA: ICD-10-CM

## 2024-11-25 DIAGNOSIS — T75.3XXA MOTION SICKNESS, INITIAL ENCOUNTER: Primary | ICD-10-CM

## 2024-11-25 RX ORDER — PROMETHAZINE HYDROCHLORIDE 25 MG/1
25 TABLET ORAL EVERY 6 HOURS PRN
Qty: 12 TABLET | Refills: 0 | Status: SHIPPED | OUTPATIENT
Start: 2024-11-25 | End: 2024-11-28

## 2024-11-25 RX ORDER — ONDANSETRON 4 MG/1
8 TABLET, ORALLY DISINTEGRATING ORAL ONCE
Status: COMPLETED | OUTPATIENT
Start: 2024-11-25 | End: 2024-11-25

## 2024-11-25 RX ORDER — ONDANSETRON 4 MG/1
8 TABLET, ORALLY DISINTEGRATING ORAL ONCE
Status: DISCONTINUED | OUTPATIENT
Start: 2024-11-25 | End: 2024-11-25

## 2024-11-25 RX ORDER — ONDANSETRON 8 MG/1
8 TABLET, ORALLY DISINTEGRATING ORAL EVERY 6 HOURS PRN
Qty: 12 TABLET | Refills: 0 | Status: SHIPPED | OUTPATIENT
Start: 2024-11-25 | End: 2024-11-28

## 2024-11-25 RX ADMIN — ONDANSETRON 8 MG: 4 TABLET, ORALLY DISINTEGRATING ORAL at 01:11

## 2024-11-25 NOTE — TELEPHONE ENCOUNTER
----- Message from Marbin sent at 11/25/2024  8:11 AM CST -----  .Who Called: Angelia M Cowans [place]    Caller is requesting assistance/information from provider's office.    Symptoms (please be specific): nausea and weak      How long has patient had these symptoms: 2 days    List of preferred pharmacies on file (remove unneeded):   [facility]/pharmacy #[id number] - [place] - [address] [facility]   Phone: [phone]  Fax: [phone]    Preferred Method of Contact: Phone Call    Patient's Preferred Phone Number on File: [phone]     Best Call Back Number, if different:    Additional Information: Pt stated she went on a 7 day cruise, got back Saturday afternoon and has been nauseous and weak since returning. Pt says she still feels like she's on the ship with the motion. She would like to be seen today. Dr. Morgan doesn't have an availability until January 9th on our end. Please advise, thank you.

## 2024-11-25 NOTE — TELEPHONE ENCOUNTER
----- Message from Dionicio sent at 11/25/2024  3:42 PM CST -----  Who Called: Angelia M Cowans [place]    Caller is requesting assistance/information from provider's office.    Symptoms (please be specific):   nausea   How long has patient had these symptoms:     List of preferred pharmacies on file (remove unneeded): [unfilled]  If different, enter pharmacy into here including location and phone number:  [facility]/PHARMACY #[id number]  [place] - [address] AT [address] [facility]          Preferred Method of Contact: Phone Call  Patient's Preferred Phone Number on File: [phone]   Best Call Back Number, if different:  Additional Information:  pt stated that she was told by  that if she wasn't feeling good by 4 to call in for med, ondansetron (ZOFRAN-ODT) 8 MG TbDL

## 2024-11-25 NOTE — PROGRESS NOTES
Angelia Dooley  11/25/2024  [id number]    Apolonia Aguilar MD  Patient Care Team:  Apolonia Aguilar MD as PCP - General (Family Medicine)  Pablito Thapa MD as Consulting Physician (Obstetrics and Gynecology)  Nickolas Cooper MD as Consulting Physician (Cardiology)      Chief Complaint:  Chief Complaint   Patient presents with    Nausea     Pt recently on a cruise. C/O nausea and weakness. States that she feels woozy        History of Present Illness:    69 y.o. female who presents today 2 days of weakness, nausea and feeling woozy. Started after getting off of a cruise ship. She did wear scopolamine patches. NO vomiting, abdominal pain, f/c, diarrhea. + headache but no respiratory symptoms. Has been unable to eat today due to nausea.     Review of Systems  General: denies f/c, weight loss, night sweats, decreased appetite  Eye: denies blurred vision, changes in vision  Respiratory: denies sob, wheezing, cough  Cardiovascular: denies chest pain, palpitations, edema  Integumentary: denies rashes, pruritis    Past Medical History  Past Medical History:   Diagnosis Date    Diabetes mellitus, type 2     Hyperlipidemia     Hypertension     Personal history of colonic polyps 02/16/2022       Medications  Medication List with Changes/Refills   New Medications    ONDANSETRON (ZOFRAN-ODT) 8 MG TBDL    Take 1 tablet (8 mg total) by mouth every 6 (six) hours as needed (nausea).   Current Medications    ALBUTEROL (PROVENTIL/VENTOLIN HFA) 90 MCG/ACTUATION INHALER    INHALE 2 PUFFS INTO THE LUNGS EVERY 4 (FOUR) HOURS AS NEEDED FOR WHEEZING. RESCUE    ALPRAZOLAM (XANAX) 0.25 MG TABLET    Take 1 tablet (0.25 mg total) by mouth 3 (three) times daily.    BISOPROLOL (ZEBETA) 5 MG TABLET    See Instructions, TAKE 1 TABLET BY MOUTH EVERY DAY, # 90 tab(s), 3 Refill(s), Pharmacy: [facility]/pharmacy #[id number], 163, cm, Height/Length Dosing, 03/31/22 10:47:00 CDT, 69, kg, Weight Dosing, 03/31/22 10:47:00 CDT    BLOOD  SUGAR DIAGNOSTIC STRP    To check BG 1 times daily, to use with insurance preferred meter    CONTOUR NEXT ONE METER MISC    USE TO TEST BLOOD GLUCOSE TWICE DAILY    GLIMEPIRIDE (AMARYL) 4 MG TABLET    TAKE 1 TABLET BY MOUTH 2 TIMES DAILY.    JANUVIA 100 MG TAB    Take 100 mg by mouth every evening.    PIOGLITAZONE (ACTOS) 30 MG TABLET    Take 1 tablet (30 mg total) by mouth once daily.    PRAVASTATIN (PRAVACHOL) 20 MG TABLET    Take 1 tablet (20 mg total) by mouth once daily.    RAMIPRIL (ALTACE) 10 MG CAPSULE    Take 1 capsule (10 mg total) by mouth once daily.    SERTRALINE (ZOLOFT) 25 MG TABLET    Take 1 tablet (25 mg total) by mouth once daily.    TRIAMTERENE-HYDROCHLOROTHIAZIDE 37.5-25 MG (DYAZIDE) 37.5-25 MG PER CAPSULE    Take 1 capsule by mouth once daily.    VALACYCLOVIR (VALTREX) 500 MG TABLET    Take 500 mg by mouth once daily.    VITAMIN E ACETATE (VITAMIN E ORAL)    Take 400 Units by mouth once daily.       Past Surgical History:   Procedure Laterality Date    CHOLECYSTECTOMY      COLONOSCOPY W/ POLYPECTOMY  02/16/2022    CYST REMOVAL  10/07/2023    scalp    HYSTERECTOMY      LEFT HEART CATHETERIZATION Left 11/10/2023    Procedure: Left heart cath;  Surgeon: Nickolas Cooper MD;  Location: [facility] CATH LAB;  Service: Cardiology;  Laterality: Left;    MYOMECTOMY         SUBJECTIVE:  Health Maintenance  Health Maintenance Topics with due status: Not Due       Topic Last Completion Date    Colorectal Cancer Screening 02/16/2022    DEXA Scan 08/04/2022    Foot Exam 01/29/2024    Eye Exam 06/12/2024    Diabetes Urine Screening 10/09/2024    Lipid Panel 10/09/2024    Hemoglobin A1c 10/09/2024     Health Maintenance Due   Topic Date Due    TETANUS VACCINE  Never done    RSV Vaccine (Age 60+ and Pregnant patients) (1 - Risk 60-74 years 1-dose series) Never done    Mammogram  11/30/2024    COVID-19 Vaccine (11 - 2024-25 season) 12/05/2024       Exam:  Vitals:    11/25/24 1057 11/25/24 1128   BP: (!) 146/66 132/72  "  BP Location: Left arm Left arm   Patient Position: Sitting Sitting   Pulse: 73    Resp: 20    Temp: 97.9 °F (36.6 °C)    TempSrc: Oral    SpO2: 98%    Weight: 69.2 kg (152 lb 9.6 oz)    Height: 5' 3" (1.6 m)      Weight: 69.2 kg (152 lb 9.6 oz)   Body mass index is 27.03 kg/m².      Physical Exam  Constitutional: NAD, alert, pleasant  Respiratory: CTAB, no wheezes, rales or rhonchi. No accessory muscle use  Eyes: EOMI  Cardiovascular: RRR, No m/r/g. No JVD. No LE edema  Gastrointestinal: BS+, nontender, nondistended  Integumentary: warm, dry, intact  Psych: AA&Ox3      ICD-10-CM ICD-9-CM   1. Motion sickness, initial encounter  T75.3XXA 994.6   2. Nausea  R11.0 787.02       1. Motion sickness, initial encounter  -     ondansetron (ZOFRAN-ODT) 8 MG TbDL; Take 1 tablet (8 mg total) by mouth every 6 (six) hours as needed (nausea).  Dispense: 12 tablet; Refill: 0  -     ondansetron disintegrating tablet 8 mg    2. Nausea  -     ondansetron disintegrating tablet 8 mg       Likely motion sickness  Zoftan 8 mg odt given in office  Zofran 8 mg odt prescribed to be taken q6 hrs  Wear scopolamine patch for the next 3 days  Eat small, frequent, bland meals  Can consider phenergan if zofran does not work  Follow up: No follow-ups on file.      Care Plan/Goals: Reviewed   Goals    None               "

## 2024-12-21 DIAGNOSIS — F33.1 MODERATE EPISODE OF RECURRENT MAJOR DEPRESSIVE DISORDER: ICD-10-CM

## 2024-12-21 DIAGNOSIS — F41.9 ANXIETY: ICD-10-CM

## 2024-12-23 RX ORDER — SERTRALINE HYDROCHLORIDE 25 MG/1
25 TABLET, FILM COATED ORAL
Qty: 30 TABLET | Refills: 0 | Status: SHIPPED | OUTPATIENT
Start: 2024-12-23

## 2024-12-24 ENCOUNTER — TELEPHONE (OUTPATIENT)
Dept: FAMILY MEDICINE | Facility: CLINIC | Age: 69
End: 2024-12-24
Payer: MEDICARE

## 2024-12-24 NOTE — TELEPHONE ENCOUNTER
Pt called stating that she noticed a red bump that itches to the top of the vaginal area. She states that it has gotten a little bigger. She called her GYN and they are out of the office all week. She asked if Dr Morgan could see her. I advised her that Dr Morgan is out of the office all week also and advised that she seek care at urgent care. Verbal understanding given.

## 2024-12-24 NOTE — TELEPHONE ENCOUNTER
----- Message from Beata sent at 12/24/2024  9:06 AM CST -----  Who Called: Angelia M Cowans [place]    Caller is requesting assistance/information from provider's office.    Symptoms (please be specific):    How long has patient had these symptoms:    List of preferred pharmacies on file (remove unneeded): [unfilled]  If different, enter pharmacy into here including location and phone number:       Patient's Preferred Phone Number on File: [phone]   Best Call Back Number, if different:  Additional Information: would like a call from nurse, declined to give info, states its about a bump and her GYN is out

## 2025-01-14 ENCOUNTER — LAB VISIT (OUTPATIENT)
Dept: LAB | Facility: HOSPITAL | Age: 70
End: 2025-01-14
Attending: FAMILY MEDICINE
Payer: MEDICARE

## 2025-01-14 DIAGNOSIS — E11.65 TYPE 2 DIABETES MELLITUS WITH HYPERGLYCEMIA, WITHOUT LONG-TERM CURRENT USE OF INSULIN: ICD-10-CM

## 2025-01-14 DIAGNOSIS — E53.8 VITAMIN B12 DEFICIENCY: ICD-10-CM

## 2025-01-14 LAB
ANION GAP SERPL CALC-SCNC: 8 MEQ/L
BUN SERPL-MCNC: 19.5 MG/DL (ref 9.8–20.1)
CALCIUM SERPL-MCNC: 10.3 MG/DL (ref 8.4–10.2)
CHLORIDE SERPL-SCNC: 103 MMOL/L (ref 98–107)
CO2 SERPL-SCNC: 30 MMOL/L (ref 23–31)
CREAT SERPL-MCNC: 0.93 MG/DL (ref 0.55–1.02)
CREAT UR-MCNC: 289.5 MG/DL (ref 45–106)
CREAT/UREA NIT SERPL: 21
EST. AVERAGE GLUCOSE BLD GHB EST-MCNC: 165.7 MG/DL
FOLATE SERPL-MCNC: 17.9 NG/ML (ref 7–31.4)
GFR SERPLBLD CREATININE-BSD FMLA CKD-EPI: >60 ML/MIN/1.73/M2
GLUCOSE SERPL-MCNC: 131 MG/DL (ref 82–115)
HBA1C MFR BLD: 7.4 %
MICROALBUMIN UR-MCNC: 19.7 UG/ML
MICROALBUMIN/CREAT RATIO PNL UR: 6.8 MG/GM CR (ref 0–30)
POTASSIUM SERPL-SCNC: 3.9 MMOL/L (ref 3.5–5.1)
SODIUM SERPL-SCNC: 141 MMOL/L (ref 136–145)
VIT B12 SERPL-MCNC: 636 PG/ML (ref 213–816)

## 2025-01-14 PROCEDURE — 82607 VITAMIN B-12: CPT

## 2025-01-14 PROCEDURE — 83036 HEMOGLOBIN GLYCOSYLATED A1C: CPT

## 2025-01-14 PROCEDURE — 82746 ASSAY OF FOLIC ACID SERUM: CPT

## 2025-01-14 PROCEDURE — 82043 UR ALBUMIN QUANTITATIVE: CPT

## 2025-01-14 PROCEDURE — 80048 BASIC METABOLIC PNL TOTAL CA: CPT

## 2025-01-14 PROCEDURE — 36415 COLL VENOUS BLD VENIPUNCTURE: CPT

## 2025-01-17 ENCOUNTER — OFFICE VISIT (OUTPATIENT)
Dept: FAMILY MEDICINE | Facility: CLINIC | Age: 70
End: 2025-01-17
Payer: MEDICARE

## 2025-01-17 ENCOUNTER — TELEPHONE (OUTPATIENT)
Dept: FAMILY MEDICINE | Facility: CLINIC | Age: 70
End: 2025-01-17

## 2025-01-17 VITALS
OXYGEN SATURATION: 98 % | HEIGHT: 63 IN | WEIGHT: 157.31 LBS | RESPIRATION RATE: 18 BRPM | TEMPERATURE: 98 F | SYSTOLIC BLOOD PRESSURE: 108 MMHG | BODY MASS INDEX: 27.87 KG/M2 | HEART RATE: 73 BPM | DIASTOLIC BLOOD PRESSURE: 70 MMHG

## 2025-01-17 DIAGNOSIS — F41.1 GAD (GENERALIZED ANXIETY DISORDER): ICD-10-CM

## 2025-01-17 DIAGNOSIS — E53.8 VITAMIN B12 DEFICIENCY: ICD-10-CM

## 2025-01-17 DIAGNOSIS — E11.65 TYPE 2 DIABETES MELLITUS WITH HYPERGLYCEMIA, WITHOUT LONG-TERM CURRENT USE OF INSULIN: ICD-10-CM

## 2025-01-17 DIAGNOSIS — E11.59 HYPERTENSION ASSOCIATED WITH DIABETES: Primary | Chronic | ICD-10-CM

## 2025-01-17 DIAGNOSIS — I15.2 HYPERTENSION ASSOCIATED WITH DIABETES: Primary | Chronic | ICD-10-CM

## 2025-01-17 PROBLEM — F33.1 MODERATE EPISODE OF RECURRENT MAJOR DEPRESSIVE DISORDER: Status: RESOLVED | Noted: 2024-10-17 | Resolved: 2025-01-17

## 2025-01-17 PROCEDURE — G2211 COMPLEX E/M VISIT ADD ON: HCPCS | Mod: ,,, | Performed by: FAMILY MEDICINE

## 2025-01-17 PROCEDURE — 99214 OFFICE O/P EST MOD 30 MIN: CPT | Mod: ,,, | Performed by: FAMILY MEDICINE

## 2025-01-17 NOTE — PROGRESS NOTES
Angelia Dooley  01/17/2025  [id number]    Apolonia Aguilar MD  Patient Care Team:  Apolonia Aguilar MD as PCP - General (Family Medicine)  Pablito Thapa MD as Consulting Physician (Obstetrics and Gynecology)  Nickolas Cooper MD as Consulting Physician (Cardiology)      Chief Complaint:  Chief Complaint   Patient presents with    Follow-up     3 month f/u with lab results       History of Present Illness:    69 y.o. female who presents today for diabetes f/u with labs. Labs reviewed by me and were discussed with patient. All questions regarding lab results were answered.     Visit today included increased complexity associated with the care of the episodic problem htn, dmii, b12 def addressed and managing the longitudinal care of the patient due to the serious and/or complex managed problem(s) .      Review of Systems  General: denies f/c, weight loss, night sweats, decreased appetite  Eye: denies blurred vision, changes in vision  Respiratory: denies sob, wheezing, cough  Cardiovascular: denies chest pain, palpitations, edema  Gastrointestinal: denies abdominal pain, n/v, constipation, diarrhea  Integumentary: denies rashes, pruritis    Past Medical History  Past Medical History:   Diagnosis Date    Diabetes mellitus, type 2     Hyperlipidemia     Hypertension     Personal history of colonic polyps 02/16/2022       Medications  Medication List with Changes/Refills   Current Medications    ALBUTEROL (PROVENTIL/VENTOLIN HFA) 90 MCG/ACTUATION INHALER    INHALE 2 PUFFS INTO THE LUNGS EVERY 4 (FOUR) HOURS AS NEEDED FOR WHEEZING. RESCUE    ALPRAZOLAM (XANAX) 0.25 MG TABLET    Take 1 tablet (0.25 mg total) by mouth 3 (three) times daily.    BISOPROLOL (ZEBETA) 5 MG TABLET    See Instructions, TAKE 1 TABLET BY MOUTH EVERY DAY, # 90 tab(s), 3 Refill(s), Pharmacy: [facility]/pharmacy #[id number], 163, cm, Height/Length Dosing, 03/31/22 10:47:00 CDT, 69, kg, Weight Dosing, 03/31/22 10:47:00 CDT    BLOOD SUGAR  DIAGNOSTIC STRP    To check BG 1 times daily, to use with insurance preferred meter    CONTOUR NEXT ONE METER MISC    USE TO TEST BLOOD GLUCOSE TWICE DAILY    GLIMEPIRIDE (AMARYL) 4 MG TABLET    TAKE 1 TABLET BY MOUTH 2 TIMES DAILY.    JANUVIA 100 MG TAB    Take 100 mg by mouth every evening.    PIOGLITAZONE (ACTOS) 30 MG TABLET    Take 1 tablet (30 mg total) by mouth once daily.    PRAVASTATIN (PRAVACHOL) 20 MG TABLET    Take 1 tablet (20 mg total) by mouth once daily.    RAMIPRIL (ALTACE) 10 MG CAPSULE    Take 1 capsule (10 mg total) by mouth once daily.    TRIAMTERENE-HYDROCHLOROTHIAZIDE 37.5-25 MG (DYAZIDE) 37.5-25 MG PER CAPSULE    Take 1 capsule by mouth once daily.    VALACYCLOVIR (VALTREX) 500 MG TABLET    Take 500 mg by mouth once daily.    VITAMIN E ACETATE (VITAMIN E ORAL)    Take 400 Units by mouth once daily.   Discontinued Medications    SERTRALINE (ZOLOFT) 25 MG TABLET    TAKE 1 TABLET BY MOUTH EVERY DAY       Past Surgical History:   Procedure Laterality Date    CHOLECYSTECTOMY      COLONOSCOPY W/ POLYPECTOMY  02/16/2022    CYST REMOVAL  10/07/2023    scalp    HYSTERECTOMY      LEFT HEART CATHETERIZATION Left 11/10/2023    Procedure: Left heart cath;  Surgeon: Nickolas Cooper MD;  Location: [facility] CATH LAB;  Service: Cardiology;  Laterality: Left;    MYOMECTOMY         SUBJECTIVE:  Health Maintenance  Health Maintenance Topics with due status: Not Due       Topic Last Completion Date    Colorectal Cancer Screening 02/16/2022    DEXA Scan 08/04/2022    Diabetic Eye Exam 06/12/2024    Lipid Panel 10/09/2024    Diabetes Urine Screening 01/14/2025    Hemoglobin A1c 01/14/2025     Health Maintenance Due   Topic Date Due    TETANUS VACCINE  Never done    RSV Vaccine (Age 60+ and Pregnant patients) (1 - Risk 60-74 years 1-dose series) Never done    Mammogram  11/30/2024    COVID-19 Vaccine (11 - 2024-25 season) 12/05/2024    Foot Exam  01/29/2025       Exam:  Vitals:    01/17/25 0957   BP: 108/70   BP  "Location: Left arm   Patient Position: Sitting   Pulse: 73   Resp: 18   Temp: 97.8 °F (36.6 °C)   TempSrc: Oral   SpO2: 98%   Weight: 71.4 kg (157 lb 4.8 oz)   Height: 5' 3" (1.6 m)     Weight: 71.4 kg (157 lb 4.8 oz)   Body mass index is 27.86 kg/m².      Physical Exam  Constitutional: NAD, alert, pleasant  Respiratory: No accessory muscle use, no cough or audible wheezing  Eyes: EOMI, no conjunctivitis   Integumentary: warm, dry, intact  Psych: AA&Ox3, answers questions appropriately        ICD-10-CM ICD-9-CM   1. Hypertension associated with diabetes  E11.59 250.80    I15.2 401.9   2. Type 2 diabetes mellitus with hyperglycemia, without long-term current use of insulin  E11.65 250.00     790.29   3. Vitamin B12 deficiency  E53.8 266.2   4. FRANKY (generalized anxiety disorder)  F41.1 300.02       1. Hypertension associated with diabetes  Overview:  At goal on ramipril 10 mg, bisoprolol 5 mg, dyazide.  Neg C with Dr. Cooper 11/2023. Asymptomatic.     Continue current Rx meds        2. Type 2 diabetes mellitus with hyperglycemia, without long-term current use of insulin  Overview:  A1c up from 7.2 to 7.4. On Januvia 100 mg, actos 30 mg, glimepiride 4 mg bid. Fasting sugars range low 100s to 150's. NO lows. Has not been able to adhere to a diabetic diet lately    Continue current Rx meds  Rtc 3 mts with labs      Orders:  -     Hemoglobin A1C; Future; Expected date: 04/17/2025  -     Basic Metabolic Panel; Future; Expected date: 04/17/2025    3. Vitamin B12 deficiency  Overview:  Taking otc b12 1000 mcg 3x per week. B12 is at goal    Continue current Rx meds        4. FRANKY (generalized anxiety disorder)  Overview:  Takes xanax prn sparingly, but has been having increased anxiety because she is caring for her ill mother. She is in need of refills.  reviewed.     FRANKY score 16. Crying often.     Risks of xanax including falls, confusion, addiction discussed with patient.     Patient declines treatment for " depression           Follow up: No follow-ups on file.      Care Plan/Goals: Reviewed   Goals    None

## 2025-01-22 ENCOUNTER — PATIENT OUTREACH (OUTPATIENT)
Facility: CLINIC | Age: 70
End: 2025-01-22
Payer: MEDICARE

## 2025-01-22 NOTE — PROGRESS NOTES
Health Maintenance Topic(s) Outreach Outcomes & Actions Taken:    Breast Cancer Screening - Outreach Outcomes & Actions Taken  : External Records Requested & Care Team Updated if Applicable       Additional Notes:  Request Mammogram Report: [facility]: not uploading in [facility]

## 2025-01-22 NOTE — LETTER
AUTHORIZATION FOR RELEASE OF CONFIDENTIAL INFORMATION      2025      Dear [facility],    We are seeing Angelia Dooley, date of birth 1955, in the clinic at [facility].  Apolonia Aguilar MD is the patient's PCP. Angelia Dooley has an outstanding lab/procedure at the time we reviewed his chart.  In order to help keep her health information updated, Angelia has authorized us to request the following medical record(s):        Mammogram       Please fax any records to Apolonia Aguilar MD's at  [phone]    If you have any questions, please contact right Leonardo LUJAN,SHANON @ [phone]             Patient Name: Angelia Dooley  :1955  Patient Phone #:[phone]                  Angelia Dooley  MRN: [id number]  : 1955  Age: 68 y.o.  Sex: female         Patient/Legal Guardian Signature  This signature was collected at 2024           _______________________________   Printed Name/Relationship to Patient      Consent for Examination and Treatment: I hereby authorize the providers and employees of [facility] ([facility]) to provide medical treatment/services which includes, but is not limited to, performing and administering tests and diagnostic procedures that are deemed necessary, including, but not limited to, imaging examinations, blood tests and other laboratory procedures as may be required by the hospital, clinic, or may be ordered by my physician(s) or persons working under the general and/or special instructions of my physician(s).      I understand and agree that this consent covers all authorized persons, including but not limited to physicians, residents, nurse practitioners, physicians' assistants, specialists, consultants, student nurses, and independently contracted physicians, who are called upon by the physician in charge, to carry out the diagnostic procedures and medical or surgical treatment.      I hereby authorize [facility] to retain or dispose of any specimens or tissue, should there be such remaining from any test or procedure.     I hereby authorize and give consent for [facility] providers and employees to take photographs, images or videotapes of such diagnostic, surgical or treatment procedures of Patient as may be required by [facility] or as may be ordered by a physician. I further acknowledge and agree that [facility] may use cameras or other devices for patient monitoring.     I am aware that the practice of medicine is not an exact science, and I acknowledge that no guarantees have been made to me as to the outcome of any tests, procedures or treatment.     Authorization for Release of Information: I understand that my insurance company and/or their agents may need information necessary to make determinations about payment/reimbursement. I hereby provide authorization to release to all insurance companies, their successors, assignees, other parties with whom they may have contracted, or others acting on their behalf, that are involved with payment for any hospital and/or clinic charges incurred by the patient, any information that they request and deem necessary for payment/reimbursement, and/or quality review.  I further authorize the release of my health information to physicians or other health care practitioners on staff who are involved in my health care now and in the future, and to other health care providers, entities, or institutions for the purpose of my continued care and treatment, including referrals.     REGISTRATION AUTHORIZATION  Form No. 07247 (Rev. 3/25/2024)    Page 1 of 3                       Medicare Patient's Certification and Authorization to Release Information and Payment Request:  I certify that the information given by me in applying for payment under Title XVIII of the Social Security Act is correct. I authorize any patino of medical or other information about me to release to  the Social SecurityAdvent[facility] Health [facility] Valleyinistration, or its intermediaries or carriers, any information needed for this or a related Medicare claim. I request that payment of authorized benefits be made on my behalf.     Assignment of Insurance Benefits:   I hereby authorize any and all insurance companies, health plans, defined   benefit plans, health insurers or any entity that is or may be responsible for payment of my medical expenses to pay all hospital and medical benefits now due, and to become due and payable to me under any hospital benefits, sick benefits, injury benefits or any other benefit for services rendered to me, including Major Medical Benefits, direct to [facility] and all independently contracted physicians. I assign any and all rights that I may have against any and all insurance companies, health plans, defined benefit plans, health insurers or any entity that is or may be responsible for payment of my medical expenses, including, but not limited to any right to appeal a denial of a claim, any right to bring any action, lawsuit, administrative proceeding, or other cause of action on my behalf. I specifically assign my right to pursue litigation against any and all insurance companies, health plans, defined benefit plans, health insurers or any entity that is or may be responsible for payment of my medical expenses based upon a refusal to pay charges.            E. Valuables: It is understood and agreed that [facility] is not liable for the damage to or loss of any money, jewelry,   documents, dentures, eye glasses, hearing aids, prosthetics, or other property of value.     F. Computer Equipment: I understand and agree that should I choose to use computer equipment owned by [facility] or if I choose to access the Internet via [facility]s network, I do so at my own risk. [facility] is not responsible for any damage to my computer equipment or to any damages of any type that might arise from my loss of equipment or data.     G.  Acceptance of Financial Responsibility:  I agree that in consideration of the services and   supplies that have been   or will be furnished to the patient, I am hereby obligated to pay all charges made for or on the account of the patient according to the standard rates (in effect at the time the services and supplies are delivered) established by [facility], including its Patient Financial Assistance Policy to the extent it is applicable. I understand that I am responsible for all charges, or portions thereof, not covered by insurance or other sources. Patient refunds will be distributed only after balances at all [facility] facilities are paid.     H. Communication Authorization:  I hereby authorize [facility] and its representatives, along with any billing service   or  who may work on their behalf, to contact me on   my cell phone and/or home phone using pre- recorded messages, artificial voice messages, automatic telephone dialing devices or other computer assisted technology, or by electronic      mail, text messaging, or by any other form of electronic communication. This includes, but is not limited to, appointment reminders, yearly physical exam reminders, preventive care reminders, patient campaigns, welcome calls, and calls about account balances on my account or any account on which I am listed as a guarantor. I understand I have the right to opt out of these communications at any time.      Relationship  Between  Facility and  Provider:      I understand that some, but not all, providers furnishing services to the patient are not employees or agents of [facility]. The patient is under the care and supervision of his/her attending physician, and it is the responsibility of the facility and its nursing staff to carry out the instructions of such physicians. It is the responsibility of the patient's physician/designee to obtain the patient's informed consent, when required, for medical or surgical  treatment, special diagnostic or therapeutic procedures, or hospital services rendered for the patient under the special instructions of the physician/designee.           REGISTRATION AUTHORIZATION  Form No. 30098 (Rev. 3/25/2024)    Page 2 of 3                       Immunizations: [facility] shares immunization information with state sponsored health departments to help you and your doctor keep track of your immunization records. By signing, you consent to have this information shared with the health department in your state:                                [place] - LINKS ([place] Immunization Network for Kids Statewide)                                [place] - MIIX ([place] Immunization Information eXchange)                                [place] - ImmPRINT (Immunization Patient Registry with Integrated Technology)     TERM: This authorization is valid for this and subsequent care/treatment I receive at [facility] and will remain valid unless/until revoked in writing by me.     [facility]: As used in this document, [facility] means all [facility] owned and managed facilities, including, but not limited to, all health centers, surgery centers, clinics, urgent care centers, and hospitals.         [facility] complies with applicable Federal civil rights laws and does not discriminate on the basis of race, color, national origin, age, disability, or sex.  ATENCIÓN: si habla español, tiene a goodson disposición servicios gratuitos de asistencia lingüística. Jovanna fletcher [phone].  CHÚ Ý: N?u b?n nói Ti?ng Vi?t, có các d?ch v? h? tr? ngôn ng? mi?n phí dành cho b?n. G?i s? [phone].        REGISTRATION AUTHORIZATION  Form No. 95242 (Rev. 3/25/2024)   Page 3 of 3

## 2025-01-23 NOTE — PROGRESS NOTES
I received a fax back from [facility] stating not ready for review as of 1/23/25.

## 2025-01-24 ENCOUNTER — TELEPHONE (OUTPATIENT)
Dept: FAMILY MEDICINE | Facility: CLINIC | Age: 70
End: 2025-01-24
Payer: MEDICARE

## 2025-01-24 DIAGNOSIS — E11.65 TYPE 2 DIABETES MELLITUS WITH HYPERGLYCEMIA, WITHOUT LONG-TERM CURRENT USE OF INSULIN: Primary | ICD-10-CM

## 2025-01-24 RX ORDER — SITAGLIPTIN 100 MG/1
100 TABLET, FILM COATED ORAL NIGHTLY
Qty: 90 TABLET | Refills: 1 | Status: SHIPPED | OUTPATIENT
Start: 2025-01-24

## 2025-01-24 NOTE — TELEPHONE ENCOUNTER
----- Message from Bettie sent at 1/24/2025  9:33 AM CST -----  .Who Called: Angelia Dooley    Refill or New Rx:Refill  RX Name and Strength: JANUVIA 100 mg   How is the patient currently taking it?  Take 100 mg by mouth every evening  Is this a 30 day or 90 day RX: 90  Local or Mail Order: Local  List of preferred pharmacies on file (remove unneeded):  [facility]/PHARMACY #[id number]  [place] [address]      Ordering Provider:      Preferred Method of Contact: Phone Call  Patient's Preferred Phone Number on File: [phone]   Best Call Back Number, if different:  Additional Information: Pt requesting 90 day refill on Januvia 100 mg

## 2025-02-25 DIAGNOSIS — E11.65 TYPE 2 DIABETES MELLITUS WITH HYPERGLYCEMIA, WITHOUT LONG-TERM CURRENT USE OF INSULIN: ICD-10-CM

## 2025-02-25 RX ORDER — GLIMEPIRIDE 4 MG/1
4 TABLET ORAL 2 TIMES DAILY
Qty: 180 TABLET | Refills: 1 | Status: SHIPPED | OUTPATIENT
Start: 2025-02-25

## 2025-02-25 NOTE — TELEPHONE ENCOUNTER
----- Message from Johanny sent at 2/25/2025  3:39 PM CST -----  .Type:  RX Refill RequestWho Called: patientRefill or New Rx:glimepiride (AMARYL) 4 MG tablet     RX Name and Strength:naHow is the patient currently taking it? (ex. 1XDay):2xdayIs this a 30 day or 90 day RX:180Preferred Pharmacy with phone number:[id number] [place] [facility] [address] [facility] or Mail Order:localOrdering Provider:PCPWould the patient rather a call back or a response via [facility]? Levon Call Back Number:[phone] Information: pt needs refill

## 2025-03-28 ENCOUNTER — TELEPHONE (OUTPATIENT)
Dept: FAMILY MEDICINE | Facility: CLINIC | Age: 70
End: 2025-03-28
Payer: MEDICARE

## 2025-03-28 NOTE — TELEPHONE ENCOUNTER
I spoke with patient and she stated she stated she had a fall and is sore. She is refusing to go to the urgent care. I scheduled patient to see Dr. He on Monday. Patient is aware that today is Dr. Aguilar's last day. Marcelino

## 2025-03-28 NOTE — TELEPHONE ENCOUNTER
Copied from CRM #[id number]. Topic: General Inquiry - Patient Advice  >> Mar 28, 2025  8:38 AM Beata wrote:  Who Called: Angelia M Cowans [place]    Caller is requesting a same day appointment. Caller declined first available appointment listed below.      When is the first available appointment?  Options offered (Virtual Visit, Urgent Care):   Symptoms or reason for appointment:      Preferred Method of Contact: Phone Call  Patient's Preferred Phone Number on File: [phone]   Best Call Back Number, if different:  Additional Information: pt has recent fall 2 weeks ago  , would like to see provider today, pt informed there wasn't any avail, would like call back from nurse

## 2025-03-31 ENCOUNTER — OFFICE VISIT (OUTPATIENT)
Dept: FAMILY MEDICINE | Facility: CLINIC | Age: 70
End: 2025-03-31
Payer: MEDICARE

## 2025-03-31 VITALS
SYSTOLIC BLOOD PRESSURE: 106 MMHG | TEMPERATURE: 98 F | OXYGEN SATURATION: 98 % | RESPIRATION RATE: 12 BRPM | HEIGHT: 63 IN | WEIGHT: 154 LBS | BODY MASS INDEX: 27.29 KG/M2 | DIASTOLIC BLOOD PRESSURE: 60 MMHG | HEART RATE: 67 BPM

## 2025-03-31 DIAGNOSIS — M79.641 RIGHT HAND PAIN: ICD-10-CM

## 2025-03-31 DIAGNOSIS — S46.819A STRAIN OF TRAPEZIUS MUSCLE, UNSPECIFIED LATERALITY, INITIAL ENCOUNTER: ICD-10-CM

## 2025-03-31 DIAGNOSIS — M25.511 ACUTE PAIN OF RIGHT SHOULDER: Primary | ICD-10-CM

## 2025-03-31 PROCEDURE — G2211 COMPLEX E/M VISIT ADD ON: HCPCS | Mod: ,,, | Performed by: FAMILY MEDICINE

## 2025-03-31 PROCEDURE — 99213 OFFICE O/P EST LOW 20 MIN: CPT | Mod: ,,, | Performed by: FAMILY MEDICINE

## 2025-03-31 RX ORDER — AZELASTINE HYDROCHLORIDE, FLUTICASONE PROPIONATE 137; 50 UG/1; UG/1
1 SPRAY, METERED NASAL 2 TIMES DAILY
COMMUNITY
Start: 2025-03-28

## 2025-03-31 RX ORDER — IBUPROFEN 200 MG
200 TABLET ORAL EVERY 6 HOURS PRN
COMMUNITY

## 2025-03-31 RX ORDER — TIZANIDINE 2 MG/1
4 TABLET ORAL EVERY 8 HOURS PRN
Qty: 21 TABLET | Refills: 0 | Status: SHIPPED | OUTPATIENT
Start: 2025-03-31

## 2025-03-31 NOTE — PROGRESS NOTES
Family Medicine    Patient ID: [id number]     Chief Complaint: Fall (Patient fell on hands and knees x3 weeks ago. Patient c/o shoulder, back, knee and hand pain)      HPI:     Angelia Dooley is a 69 y.o. female here today to be evaluated for aches and pains after a fall 3 weeks ago.  She tripped over a sidewalk and fell forwar landing on bilateral hands and knees.  She did not hit her head and she did not lose consciousness.  She went to an orthopedic urgent care where x-rays of the knees were taken which found only mild arthritis.    Her bilateral superior trapezoid areas are very tender and she has been using a 10s unit on which makes it feel better.  There is no bony tenderness in the area so we will give her tizanidine and send her to physical therapy.  Bilateral upper extremities are neurovascularly intact.      The metacarpal of the 3rd finger of the right hand is also very tender and she has limited extension of the 3rd digit but otherwise examination of the hands entirely normal.  We will get an x-ray for this chest to rule out a fracture but exam was otherwise normal.      Her right shoulder also hurts.  There is tenderness just lateral and anterior to the acromion.  There is no bony tenderness and rotator cuff provocative measures were grossly normal today.  We will get an x-ray of the shoulder to and send to physical therapy.    Her bilateral knees are not bothering her too much.  X-rays have already been done and show arthritis.        QUALITY METRICS:       Statin Therapy    A1c Control    BP Control    Breast Ca Scrn    Colon Ca Scrn    Cervical Ca Scrn    DM Eye Exam Scrn    Tobacco Q rate        MEDICAL HISTORY:       Past Medical History:   Diagnosis Date    Diabetes mellitus, type 2     Hyperlipidemia     Hypertension     Personal history of colonic polyps 02/16/2022        Past Surgical History:   Procedure Laterality Date    CHOLECYSTECTOMY      COLONOSCOPY W/ POLYPECTOMY  02/16/2022     CYST REMOVAL  10/07/2023    scalp    HYSTERECTOMY      LEFT HEART CATHETERIZATION Left 11/10/2023    Procedure: Left heart cath;  Surgeon: Nickolas Cooper MD;  Location: [facility] CATH LAB;  Service: Cardiology;  Laterality: Left;    MYOMECTOMY          Social History     Tobacco Use    Smoking status: Never    Smokeless tobacco: Never   Substance and Sexual Activity    Alcohol use: Not Currently     Alcohol/week: 1.0 standard drink of alcohol     Types: 1 Glasses of wine per week    Drug use: Never    Sexual activity: Not Currently     Partners: Male     Birth control/protection: None        Current Outpatient Medications   Medication Instructions    albuterol (PROVENTIL/VENTOLIN HFA) 90 mcg/actuation inhaler 2 puffs, Inhalation, Every 4 hours PRN, Rescue    ALPRAZolam (XANAX) 0.25 mg, Oral, 3 times daily    azelastine-fluticasone (DYMISTA) 137-50 mcg/spray Spry nassal spray 1 spray, 2 times daily    bisoprolol (ZEBETA) 5 MG tablet See Instructions, TAKE 1 TABLET BY MOUTH EVERY DAY, # 90 tab(s), 3 Refill(s), Pharmacy: [facility]/pharmacy #[id number], 163, cm, Height/Length Dosing, 03/31/22 10:47:00 CDT, 69, kg, Weight Dosing, 03/31/22 10:47:00 CDT    blood sugar diagnostic Strp To check BG 1 times daily, to use with insurance preferred meter    CONTOUR NEXT ONE METER Misc USE TO TEST BLOOD GLUCOSE TWICE DAILY    glimepiride (AMARYL) 4 mg, Oral, 2 times daily    ibuprofen (ADVIL,MOTRIN) 200 mg, Every 6 hours PRN    JANUVIA 100 mg, Oral, Nightly    MELATONIN, BULK, MISC by Misc.(Non-Drug; Combo Route) route. 2 gummies at night    pioglitazone (ACTOS) 30 mg, Oral, Daily    pravastatin (PRAVACHOL) 20 mg, Oral, Daily    ramipriL (ALTACE) 10 mg, Oral, Daily    tiZANidine (ZANAFLEX) 4 mg, Oral, Every 8 hours PRN    triamterene-hydrochlorothiazide 37.5-25 mg (DYAZIDE) 37.5-25 mg per capsule 1 capsule, Oral, Daily    valACYclovir (VALTREX) 500 mg, Daily    vitamin E acetate (VITAMIN E ORAL) 400 Units, Daily       Review of patient's  "allergies indicates:   Allergen Reactions    Acetaminophen-codeine     Bactrim [sulfamethoxazole-trimethoprim]     Cyclobenzaprine     Hydrocodone-acetaminophen     Tramadol     Versed [midazolam] Nausea Only        Patient Care Team:  Apolonia Aguilar MD as PCP - General (Family Medicine)  Pablito Thapa MD as Consulting Physician (Obstetrics and Gynecology)  Nickolas Cooper MD as Consulting Physician (Cardiology)     Subjective:     Review of Systems   Constitutional:  Negative for activity change, appetite change, fever and unexpected weight change.   HENT:  Negative for congestion, rhinorrhea and sore throat.    Eyes:  Negative for visual disturbance.   Respiratory:  Negative for cough, chest tightness and shortness of breath.    Cardiovascular:  Negative for chest pain, palpitations and leg swelling.   Gastrointestinal:  Negative for abdominal pain, blood in stool, nausea and vomiting.   Genitourinary:  Negative for difficulty urinating.   Musculoskeletal:  Negative for arthralgias.   Skin:  Negative for rash.   Neurological:  Negative for dizziness, speech difficulty, weakness, light-headedness and numbness.   Psychiatric/Behavioral:  Negative for behavioral problems, confusion, dysphoric mood, self-injury, sleep disturbance and suicidal ideas.        12 point review of systems conducted, negative except as stated in the history of present illness. See HPI for details.    Objective:     Visit Vitals  /60 (BP Location: Right arm, Patient Position: Sitting)   Pulse 67   Temp 98.2 °F (36.8 °C) (Oral)   Resp 12   Ht 5' 3" (1.6 m)   Wt 69.9 kg (154 lb)   SpO2 98%   BMI 27.28 kg/m²       Physical Exam  Constitutional:       General: She is not in acute distress.     Appearance: Normal appearance. She is not ill-appearing.   HENT:      Head: Normocephalic and atraumatic.      Right Ear: External ear normal.      Left Ear: External ear normal.      Nose: No congestion.   Eyes:      General:        "  Right eye: No discharge.         Left eye: No discharge.      Extraocular Movements: Extraocular movements intact.      Conjunctiva/sclera: Conjunctivae normal.   Cardiovascular:      Rate and Rhythm: Normal rate and regular rhythm.   Pulmonary:      Effort: Pulmonary effort is normal. No respiratory distress.      Breath sounds: Normal breath sounds.   Musculoskeletal:      Right lower leg: No edema.      Left lower leg: No edema.      Comments: Neck:   No midline spinal bilateral upper extremity neurovascularly intact   Significant tenderness bilateral superior trapezius    Right shoulder:  No bony tenderness   No obvious deformity   No skin changes   Tenderness lateral and anterior to acromion  Sits exam normal    Right hand:   Tenderness in the distal 3rd right metacarpal   Right hand is neurovascularly intact   No obvious deformity, no skin changes   Skin:     Capillary Refill: Capillary refill takes less than 2 seconds.   Neurological:      Mental Status: She is alert and oriented to person, place, and time. Mental status is at baseline.   Psychiatric:         Mood and Affect: Mood normal.         Behavior: Behavior normal.         Thought Content: Thought content normal.         Judgment: Judgment normal.         Recent labs:     Chemistry:  Lab Results   Component Value Date     01/14/2025    K 3.9 01/14/2025    BUN 19.5 01/14/2025    CREATININE 0.93 01/14/2025    EGFRNORACEVR >60 01/14/2025    GLUCOSE 131 (H) 01/14/2025    CALCIUM 10.3 (H) 01/14/2025    ALKPHOS 43 10/09/2024    LABPROT 7.7 (H) 10/09/2024    ALBUMIN 3.6 10/09/2024    BILIDIR 0.2 04/06/2022    IBILI 0.30 04/06/2022    AST 23 10/09/2024    ALT 14 10/09/2024    WISJUCAI50KN 44.4 04/23/2024    TSH 0.908 10/09/2024        Lab Results   Component Value Date    HGBA1C 7.4 (H) 01/14/2025        Hematology:  Lab Results   Component Value Date    WBC 4.34 (L) 10/09/2024    HGB 11.5 (L) 10/09/2024    HCT 34.7 (L) 10/09/2024     10/09/2024        Lipid Panel:  Lab Results   Component Value Date    CHOL 176 10/09/2024    HDL 54 10/09/2024    .00 10/09/2024    TRIG 77 10/09/2024    TOTALCHOLEST 3 10/09/2024        Urine:  Lab Results   Component Value Date    APPEARANCEUA Clear 10/17/2024    SGUA 1.023 10/17/2024    PROTEINUA Negative 10/17/2024    KETONESUA Negative 10/17/2024    LEUKOCYTESUR Negative 10/17/2024    RBCUA 0-5 10/17/2024    WBCUA 0-5 10/17/2024    BACTERIA None Seen 10/17/2024    SQEPUA Trace 10/17/2024    HYALINECASTS 3-5 (A) 10/17/2024    CREATRANDUR 289.5 (H) 01/14/2025    PROTEINURINE 16.1 03/06/2023        Assessment:       ICD-10-CM ICD-9-CM   1. Acute pain of right shoulder  M25.511 719.41   2. Strain of trapezius muscle, unspecified laterality, initial encounter  S46.819A 840.8   3. Right hand pain  M79.641 729.5        Plan:     1. Acute pain of right shoulder  -     X-ray Shoulder 2 or More Views Right; Future; Expected date: 03/31/2025    2. Strain of trapezius muscle, unspecified laterality, initial encounter  -     tiZANidine (ZANAFLEX) 2 MG tablet; Take 2 tablets (4 mg total) by mouth every 8 (eight) hours as needed (Pain).  Dispense: 21 tablet; Refill: 0    3. Right hand pain  -     X-Ray Hand Complete Right; Future; Expected date: 03/31/2025       Physical therapy for upper back/trapezius and 4 right shoulder.  And pain should resolve on its own assuming no fracture.  If no improvement of pain with PT we will send to ortho.  No follow-ups on file. In addition to their scheduled follow up, the patient has also been instructed to follow up on as needed basis.     Future Appointments   Date Time Provider Department Center   3/31/2025  4:20 PM [facility] XR1 [facility] PD[facility]   3/31/2025  4:25 PM [facility] XR1 [facility] PDM   10/21/2025  9:30 AM Apolonia Aguilar [place] [facility] [place] He MD

## 2025-04-01 ENCOUNTER — RESULTS FOLLOW-UP (OUTPATIENT)
Dept: PRIMARY CARE CLINIC | Facility: CLINIC | Age: 70
End: 2025-04-01
Payer: MEDICARE

## 2025-04-04 ENCOUNTER — OFFICE VISIT (OUTPATIENT)
Dept: URGENT CARE | Facility: CLINIC | Age: 70
End: 2025-04-04
Payer: MEDICARE

## 2025-04-04 VITALS
RESPIRATION RATE: 18 BRPM | DIASTOLIC BLOOD PRESSURE: 66 MMHG | TEMPERATURE: 99 F | HEART RATE: 70 BPM | WEIGHT: 154 LBS | SYSTOLIC BLOOD PRESSURE: 112 MMHG | HEIGHT: 63 IN | OXYGEN SATURATION: 98 % | BODY MASS INDEX: 27.29 KG/M2

## 2025-04-04 DIAGNOSIS — J06.9 UPPER RESPIRATORY TRACT INFECTION, UNSPECIFIED TYPE: Primary | ICD-10-CM

## 2025-04-04 DIAGNOSIS — R05.1 ACUTE COUGH: ICD-10-CM

## 2025-04-04 DIAGNOSIS — J02.9 SORE THROAT: ICD-10-CM

## 2025-04-04 LAB
CTP QC/QA: YES
MOLECULAR STREP A: NEGATIVE
POC MOLECULAR INFLUENZA A AGN: NEGATIVE
POC MOLECULAR INFLUENZA B AGN: NEGATIVE
SARS CORONAVIRUS 2 ANTIGEN: NEGATIVE

## 2025-04-04 RX ORDER — PROMETHAZINE HYDROCHLORIDE AND DEXTROMETHORPHAN HYDROBROMIDE 6.25; 15 MG/5ML; MG/5ML
5 SYRUP ORAL
Qty: 120 ML | Refills: 0 | Status: SHIPPED | OUTPATIENT
Start: 2025-04-04

## 2025-04-04 RX ORDER — PHENIRAMINE MALEATE, PHENYLEPHRINE HCL 17.5; 1 MG/1; MG/1
1 TABLET ORAL EVERY 4 HOURS PRN
Qty: 30 TABLET | Refills: 0 | Status: SHIPPED | OUTPATIENT
Start: 2025-04-04 | End: 2025-04-14

## 2025-04-04 RX ORDER — BETAMETHASONE SODIUM PHOSPHATE AND BETAMETHASONE ACETATE 3; 3 MG/ML; MG/ML
6 INJECTION, SUSPENSION INTRA-ARTICULAR; INTRALESIONAL; INTRAMUSCULAR; SOFT TISSUE
Status: COMPLETED | OUTPATIENT
Start: 2025-04-04 | End: 2025-04-04

## 2025-04-04 RX ADMIN — BETAMETHASONE SODIUM PHOSPHATE AND BETAMETHASONE ACETATE 6 MG: 3; 3 INJECTION, SUSPENSION INTRA-ARTICULAR; INTRALESIONAL; INTRAMUSCULAR; SOFT TISSUE at 02:04

## 2025-04-04 NOTE — PROGRESS NOTES
"Subjective:      Patient ID: Angelia Dooley is a 69 y.o. female.    Vitals:  height is 5' 3" (1.6 m) and weight is 69.9 kg (154 lb). Her oral temperature is 98.6 °F (37 °C). Her blood pressure is 112/66 and her pulse is 70. Her respiration is 18 and oxygen saturation is 98%.     Chief Complaint: Sore Throat     Patient is a 69 y.o. female who presents to urgent care with complaints of chills, feverish, headache, sore throat, cough, since yesterday. Alleviating factors include Tylenol, Ibuprofen, Coricidine with no relief. Patient denies n/v/d.        Constitution: Positive for chills, fatigue and fever.   HENT:  Positive for congestion, postnasal drip and sore throat.    Neck: neck negative.   Cardiovascular: Negative.    Eyes: Negative.    Respiratory: Negative.     Gastrointestinal: Negative.    Endocrine: negative.   Genitourinary: Negative.    Musculoskeletal: Negative.    Skin: Negative.    Allergic/Immunologic: Negative.    Neurological:  Positive for headaches.   Hematologic/Lymphatic: Negative.    Psychiatric/Behavioral: Negative.        Objective:     Physical Exam   Constitutional: She is oriented to person, place, and time. She appears well-developed. She is cooperative. She does not appear ill.   HENT:   Head: Normocephalic and atraumatic.   Ears:   Right Ear: Hearing, tympanic membrane, external ear and ear canal normal.   Left Ear: Hearing, tympanic membrane, external ear and ear canal normal.   Nose: Rhinorrhea and congestion present. No mucosal edema or nasal deformity. No epistaxis. Right sinus exhibits no maxillary sinus tenderness and no frontal sinus tenderness. Left sinus exhibits no maxillary sinus tenderness and no frontal sinus tenderness.   Mouth/Throat: Uvula is midline, oropharynx is clear and moist and mucous membranes are normal. Mucous membranes are moist. No trismus in the jaw. Normal dentition. No uvula swelling. No posterior oropharyngeal erythema.   Eyes: Conjunctivae and " lids are normal.   Neck: Trachea normal and phonation normal. Neck supple.   Cardiovascular: Normal rate, regular rhythm, normal heart sounds and normal pulses.   Pulmonary/Chest: Effort normal and breath sounds normal. No respiratory distress.   Abdominal: Normal appearance and bowel sounds are normal. Soft.   Musculoskeletal: Normal range of motion.         General: Normal range of motion.   Lymphadenopathy:     She has no cervical adenopathy.   Neurological: She is alert and oriented to person, place, and time. She exhibits normal muscle tone.   Skin: Skin is warm, dry and intact. Capillary refill takes less than 2 seconds.   Psychiatric: Her speech is normal and behavior is normal. Judgment and thought content normal.   Nursing note and vitals reviewed.         Previous History      Review of patient's allergies indicates:   Allergen Reactions    Acetaminophen-codeine Nausea Only    Bactrim [sulfamethoxazole-trimethoprim] Nausea Only    Cyclobenzaprine Nausea Only    Hydrocodone-acetaminophen Nausea Only    Tramadol Nausea Only    Versed [midazolam] Nausea Only       Past Medical History:   Diagnosis Date    Diabetes mellitus, type 2     Hyperlipidemia     Hypertension     Personal history of colonic polyps 02/16/2022     Current Outpatient Medications   Medication Instructions    albuterol (PROVENTIL/VENTOLIN HFA) 90 mcg/actuation inhaler 2 puffs, Inhalation, Every 4 hours PRN, Rescue    ALPRAZolam (XANAX) 0.25 mg, Oral, 3 times daily    azelastine-fluticasone (DYMISTA) 137-50 mcg/spray Spry nassal spray 1 spray, 2 times daily    bisoprolol (ZEBETA) 5 MG tablet See Instructions, TAKE 1 TABLET BY MOUTH EVERY DAY, # 90 tab(s), 3 Refill(s), Pharmacy: [facility]/pharmacy #[id number], 163, cm, Height/Length Dosing, 03/31/22 10:47:00 CDT, 69, kg, Weight Dosing, 03/31/22 10:47:00 CDT    blood sugar diagnostic Strp To check BG 1 times daily, to use with insurance preferred meter    CONTOUR NEXT ONE METER Misc USE TO TEST BLOOD  "GLUCOSE TWICE DAILY    glimepiride (AMARYL) 4 mg, Oral, 2 times daily    ibuprofen (ADVIL,MOTRIN) 200 mg, Every 6 hours PRN    JANUVIA 100 mg, Oral, Nightly    MELATONIN, BULK, MISC by Misc.(Non-Drug; Combo Route) route. 2 gummies at night    phenylephrine-pheniramine (ALAHIST D) 10-17.5 mg Tab 1 tablet, Oral, Every 4 hours PRN    pioglitazone (ACTOS) 30 mg, Oral, Daily    pravastatin (PRAVACHOL) 20 mg, Oral, Daily    promethazine-dextromethorphan (PROMETHAZINE-DM) 6.25-15 mg/5 mL Syrp 5 mLs, Oral, Every 6-8 hours PRN, May cause sedation.  Do not drive or operate heavy machinery after taking this medication.    ramipriL (ALTACE) 10 mg, Oral, Daily    tiZANidine (ZANAFLEX) 4 mg, Oral, Every 8 hours PRN    triamterene-hydrochlorothiazide 37.5-25 mg (DYAZIDE) 37.5-25 mg per capsule 1 capsule, Oral, Daily    UNABLE TO FIND medication name: Immune 24    valACYclovir (VALTREX) 500 mg, Daily PRN    vitamin E acetate (VITAMIN E ORAL) 400 Units, Daily     Past Surgical History:   Procedure Laterality Date    CHOLECYSTECTOMY      COLONOSCOPY W/ POLYPECTOMY  02/16/2022    CYST REMOVAL  10/07/2023    scalp    HYSTERECTOMY      LEFT HEART CATHETERIZATION Left 11/10/2023    Procedure: Left heart cath;  Surgeon: Nickolas Cooper MD;  Location: [facility] CATH LAB;  Service: Cardiology;  Laterality: Left;    MYOMECTOMY       Family History   Problem Relation Name Age of Onset    Diabetes type I Mother Roxanne Brady     Heart failure Mother Roxanne Brady     Arthritis Mother Roxanne Brady     Depression Mother Roxanne Brady     Diabetes Mother Roxanne Brady     Hypertension Mother Roxanne Brady     Kidney disease Mother Roxanne Brady     Vision loss Mother Roxanne Brady     Heart failure Father Kwan Dominguez     Hypertension Father Kwan Dominguez     Heart disease Father Kwan Dominguez        Social History[1]     Physical Exam      Vital Signs Reviewed   /66 (Patient Position: Sitting)   Pulse 70   Temp 98.6 °F (37 °C) (Oral)   Resp 18   Ht 5' 3" (1.6 " m)   Wt 69.9 kg (154 lb)   SpO2 98%   BMI 27.28 kg/m²        Procedures    Procedures     Labs     Results for orders placed or performed in visit on 04/04/25   POCT Strep A, Molecular    Collection Time: 04/04/25  2:33 PM   Result Value Ref Range    Molecular Strep A, POC Negative Negative     Acceptable Yes    SARS Coronavirus 2 Antigen, POCT Manual Read    Collection Time: 04/04/25  2:38 PM   Result Value Ref Range    SARS Coronavirus 2 Antigen Negative Negative, Presumptive Negative     Acceptable Yes    POCT Influenza A/B Molecular    Collection Time: 04/04/25  2:38 PM   Result Value Ref Range    POC Molecular Influenza A Ag Negative Negative    POC Molecular Influenza B Ag Negative Negative     Acceptable Yes      Assessment:     1. Upper respiratory tract infection, unspecified type    2. Sore throat    3. Acute cough        Plan:   INSTRUCTIONS:        An upper respiratory infection is also called a cold. It can affect your nose, throat, ears, and sinuses. Cold symptoms are usually worst for the first 3 to 5 days. Most people get better in 7 to 14 days. You may continue to cough for 2 to 3 weeks. Colds are caused by viruses and do not get better with antibiotics.    DISCHARGE INSTRUCTIONS:    Call your local emergency number (911 in the ) if:  You have chest pain or trouble breathing.    Seek care immediately if:  You have a fever over 102ºF (39ºC).    Call your doctor if:  You have a low fever.  Your sore throat gets worse or you see white or yellow spots in your throat.  Your symptoms get worse after 3 to 5 days or are not better in 14 days.  You have a rash anywhere on your skin.  You have large, tender lumps in your neck.  You have thick, green, or yellow drainage from your nose.  You cough up thick yellow, green, or bloody mucus.  You have a bad earache.  You have questions or concerns about your condition or care.    Medicines:  You may need any of the  following:    Decongestants help reduce nasal congestion and help you breathe more easily. If you take decongestant pills, they may make you feel restless or cause problems with your sleep. Do not use decongestant sprays for more than a few days.    Cough suppressants help reduce coughing. Ask your healthcare provider which type of cough medicine is best for you.    NSAIDs , such as ibuprofen, help decrease swelling, pain, and fever. NSAIDs can cause stomach bleeding or kidney problems in certain people. If you take blood thinner medicine, always ask your healthcare provider if NSAIDs are safe for you. Always read the medicine label and follow directions.    Acetaminophen decreases pain and fever. It is available without a doctor's order. Ask how much to take and how often to take it. Follow directions. Read the labels of all other medicines you are using to see if they also contain acetaminophen, or ask your doctor or pharmacist. Acetaminophen can cause liver damage if not taken correctly.    Take your medicine as directed. Contact your healthcare provider if you think your medicine is not helping or if you have side effects. Tell your provider if you are allergic to any medicine. Keep a list of the medicines, vitamins, and herbs you take. Include the amounts, and when and why you take them. Bring the list or the pill bottles to follow-up visits. Carry your medicine list with you in case of an emergency.    Self-care:  Rest as much as possible. Slowly start to do more each day.    Drink more liquids as directed. Liquids will help thin and loosen mucus so you can cough it up. Liquids will also help prevent dehydration. Liquids that help prevent dehydration include water, fruit juice, and broth. Do not drink liquids that contain caffeine. Caffeine can increase your risk for dehydration. Ask your healthcare provider how much liquid to drink each day.  Soothe a sore throat. Gargle with warm salt water. Make salt water  by dissolving ¼ teaspoon salt in 1 cup warm water. You may also suck on hard candy or throat lozenges. You may use a sore throat spray.    Use a humidifier or vaporizer. Use a cool mist humidifier or a vaporizer to increase air moisture in your home. This may make it easier for you to breathe and help decrease your cough.  Use saline nasal drops as directed. These help relieve congestion.    Apply petroleum-based jelly around the outside of your nostrils. This can decrease irritation from blowing your nose.    Do not smoke. Nicotine and other chemicals in cigarettes and cigars can make your symptoms worse. They can also cause infections such as bronchitis or pneumonia. Ask your healthcare provider for information if you currently smoke and need help to quit. E-cigarettes or smokeless tobacco still contain nicotine. Talk to your healthcare provider before you use these products.    Prevent a cold:  Wash your hands often. Use soap and water every time you wash your hands. Rub your soapy hands together, lacing your fingers. Use the fingers of one hand to scrub under the nails of the other hand. Wash for at least 20 seconds. Rinse with warm, running water for several seconds. Then dry your hands. Use hand  gel if soap and water are not available. Do not touch your eyes or mouth without washing your hands first.    Handwashing    Cover a sneeze or cough. Use a tissue that covers your mouth and nose. Put the used tissue in the trash right away. Use the bend of your arm if a tissue is not available. Wash your hands well with soap and water or use a hand . Do not stand close to anyone who is sneezing or coughing.    Try to stay away from others while you are sick. This is especially important during the first 2 to 3 days when the virus is more easily spread. Wait until a fever, cough, or other symptoms are gone before you return to work or other regular activities.    Do not share items while you are sick.  This includes food, drinks, eating utensils, and dishes.      Upper respiratory tract infection, unspecified type  -     promethazine-dextromethorphan (PROMETHAZINE-DM) 6.25-15 mg/5 mL Syrp; Take 5 mLs by mouth every 6 to 8 hours as needed (cough). May cause sedation.  Do not drive or operate heavy machinery after taking this medication.  Dispense: 120 mL; Refill: 0  -     phenylephrine-pheniramine (ALAHIST D) 10-17.5 mg Tab; Take 1 tablet by mouth every 4 (four) hours as needed (congestion).  Dispense: 30 tablet; Refill: 0  -     betamethasone acetate-betamethasone sodium phosphate injection 6 mg    Sore throat  -     POCT Strep A, Molecular  -     SARS Coronavirus 2 Antigen, POCT Manual Read  -     POCT Influenza A/B Molecular    Acute cough  -     promethazine-dextromethorphan (PROMETHAZINE-DM) 6.25-15 mg/5 mL Syrp; Take 5 mLs by mouth every 6 to 8 hours as needed (cough). May cause sedation.  Do not drive or operate heavy machinery after taking this medication.  Dispense: 120 mL; Refill: 0                         [1]   Social History  Tobacco Use    Smoking status: Never    Smokeless tobacco: Never   Substance Use Topics    Alcohol use: Not Currently     Alcohol/week: 1.0 standard drink of alcohol     Types: 1 Glasses of wine per week    Drug use: Never

## 2025-08-23 ENCOUNTER — PATIENT MESSAGE (OUTPATIENT)
Dept: RESEARCH | Facility: HOSPITAL | Age: 70
End: 2025-08-23
Payer: MEDICARE

## (undated) DEVICE — CATH IMPULSE 5F 100CM FR4

## (undated) DEVICE — CATH FL 3.5 5FR

## (undated) DEVICE — BAND TR COMP DEVICE REG 24CM

## (undated) DEVICE — DRAPE ANGIO BRACH 38X44IN

## (undated) DEVICE — PAD HEARTSTART DEFIB ADULT

## (undated) DEVICE — Device

## (undated) DEVICE — GUIDEWIRE INQWIRE SE 3MM JTIP

## (undated) DEVICE — CATH EMPULSE ANGLED 5FR PIGTAI

## (undated) DEVICE — SET ANGIO ACIST CVI ANGIOTOUCH

## (undated) DEVICE — CANNULA NASAL ADULT

## (undated) DEVICE — KIT GLIDESHEATH SLEND 6FR 10CM